# Patient Record
Sex: MALE | Race: BLACK OR AFRICAN AMERICAN | Employment: OTHER | URBAN - METROPOLITAN AREA
[De-identification: names, ages, dates, MRNs, and addresses within clinical notes are randomized per-mention and may not be internally consistent; named-entity substitution may affect disease eponyms.]

---

## 2020-12-30 ENCOUNTER — HOSPITAL ENCOUNTER (EMERGENCY)
Age: 80
Discharge: HOME | End: 2020-12-31
Attending: EMERGENCY MEDICINE
Payer: OTHER GOVERNMENT

## 2020-12-30 ENCOUNTER — APPOINTMENT (OUTPATIENT)
Dept: GENERAL RADIOLOGY | Age: 80
End: 2020-12-30
Payer: OTHER GOVERNMENT

## 2020-12-30 DIAGNOSIS — I10 SEVERE HYPERTENSION: Primary | ICD-10-CM

## 2020-12-30 DIAGNOSIS — I50.9 CONGESTIVE HEART FAILURE, UNSPECIFIED HF CHRONICITY, UNSPECIFIED HEART FAILURE TYPE (HCC): ICD-10-CM

## 2020-12-30 LAB
ALBUMIN SERPL-MCNC: 4 G/DL (ref 3.5–5.2)
ALP BLD-CCNC: 88 U/L (ref 40–129)
ALT SERPL-CCNC: 23 U/L (ref 0–40)
ANION GAP SERPL CALCULATED.3IONS-SCNC: 7 MMOL/L (ref 7–16)
AST SERPL-CCNC: 26 U/L (ref 0–39)
BASOPHILS ABSOLUTE: 0.03 E9/L (ref 0–0.2)
BASOPHILS RELATIVE PERCENT: 0.4 % (ref 0–2)
BILIRUB SERPL-MCNC: 0.4 MG/DL (ref 0–1.2)
BUN BLDV-MCNC: 26 MG/DL (ref 8–23)
CALCIUM SERPL-MCNC: 9.4 MG/DL (ref 8.6–10.2)
CHLORIDE BLD-SCNC: 100 MMOL/L (ref 98–107)
CO2: 32 MMOL/L (ref 22–29)
CREAT SERPL-MCNC: 1.4 MG/DL (ref 0.7–1.2)
EOSINOPHILS ABSOLUTE: 0.09 E9/L (ref 0.05–0.5)
EOSINOPHILS RELATIVE PERCENT: 1.3 % (ref 0–6)
GFR AFRICAN AMERICAN: 59
GFR NON-AFRICAN AMERICAN: 59 ML/MIN/1.73
GLUCOSE BLD-MCNC: 213 MG/DL (ref 74–99)
HCT VFR BLD CALC: 36.6 % (ref 37–54)
HEMOGLOBIN: 12.8 G/DL (ref 12.5–16.5)
IMMATURE GRANULOCYTES #: 0.01 E9/L
IMMATURE GRANULOCYTES %: 0.1 % (ref 0–5)
LIPASE: 21 U/L (ref 13–60)
LYMPHOCYTES ABSOLUTE: 1.73 E9/L (ref 1.5–4)
LYMPHOCYTES RELATIVE PERCENT: 25 % (ref 20–42)
MCH RBC QN AUTO: 32.9 PG (ref 26–35)
MCHC RBC AUTO-ENTMCNC: 35 % (ref 32–34.5)
MCV RBC AUTO: 94.1 FL (ref 80–99.9)
MONOCYTES ABSOLUTE: 0.74 E9/L (ref 0.1–0.95)
MONOCYTES RELATIVE PERCENT: 10.7 % (ref 2–12)
NEUTROPHILS ABSOLUTE: 4.31 E9/L (ref 1.8–7.3)
NEUTROPHILS RELATIVE PERCENT: 62.5 % (ref 43–80)
PDW BLD-RTO: 13.9 FL (ref 11.5–15)
PLATELET # BLD: 140 E9/L (ref 130–450)
PMV BLD AUTO: 10.1 FL (ref 7–12)
POTASSIUM SERPL-SCNC: 3.6 MMOL/L (ref 3.5–5)
PRO-BNP: 1234 PG/ML (ref 0–450)
RBC # BLD: 3.89 E12/L (ref 3.8–5.8)
SODIUM BLD-SCNC: 139 MMOL/L (ref 132–146)
TOTAL PROTEIN: 6.8 G/DL (ref 6.4–8.3)
TROPONIN: <0.01 NG/ML (ref 0–0.03)
WBC # BLD: 6.9 E9/L (ref 4.5–11.5)

## 2020-12-30 PROCEDURE — 85025 COMPLETE CBC W/AUTO DIFF WBC: CPT

## 2020-12-30 PROCEDURE — 71045 X-RAY EXAM CHEST 1 VIEW: CPT

## 2020-12-30 PROCEDURE — 83690 ASSAY OF LIPASE: CPT

## 2020-12-30 PROCEDURE — 83880 ASSAY OF NATRIURETIC PEPTIDE: CPT

## 2020-12-30 PROCEDURE — 96375 TX/PRO/DX INJ NEW DRUG ADDON: CPT

## 2020-12-30 PROCEDURE — 93005 ELECTROCARDIOGRAM TRACING: CPT | Performed by: EMERGENCY MEDICINE

## 2020-12-30 PROCEDURE — 6370000000 HC RX 637 (ALT 250 FOR IP): Performed by: EMERGENCY MEDICINE

## 2020-12-30 PROCEDURE — 84484 ASSAY OF TROPONIN QUANT: CPT

## 2020-12-30 PROCEDURE — 2500000003 HC RX 250 WO HCPCS: Performed by: EMERGENCY MEDICINE

## 2020-12-30 PROCEDURE — 81003 URINALYSIS AUTO W/O SCOPE: CPT

## 2020-12-30 PROCEDURE — 80053 COMPREHEN METABOLIC PANEL: CPT

## 2020-12-30 PROCEDURE — 96374 THER/PROPH/DIAG INJ IV PUSH: CPT

## 2020-12-30 PROCEDURE — 99285 EMERGENCY DEPT VISIT HI MDM: CPT

## 2020-12-30 RX ORDER — BUMETANIDE 0.25 MG/ML
1 INJECTION, SOLUTION INTRAMUSCULAR; INTRAVENOUS ONCE
Status: COMPLETED | OUTPATIENT
Start: 2020-12-30 | End: 2020-12-30

## 2020-12-30 RX ORDER — NITROGLYCERIN 0.4 MG/1
0.4 TABLET SUBLINGUAL EVERY 5 MIN PRN
Status: DISCONTINUED | OUTPATIENT
Start: 2020-12-30 | End: 2020-12-31 | Stop reason: HOSPADM

## 2020-12-30 RX ADMIN — BUMETANIDE 1 MG: 0.25 INJECTION, SOLUTION INTRAMUSCULAR; INTRAVENOUS at 23:01

## 2020-12-30 RX ADMIN — NITROGLYCERIN 1 INCH: 20 OINTMENT TOPICAL at 23:01

## 2020-12-30 ASSESSMENT — PAIN DESCRIPTION - PAIN TYPE: TYPE: ACUTE PAIN

## 2020-12-30 ASSESSMENT — PAIN DESCRIPTION - FREQUENCY: FREQUENCY: CONTINUOUS

## 2020-12-30 ASSESSMENT — PAIN DESCRIPTION - LOCATION: LOCATION: ABDOMEN;CHEST

## 2020-12-30 ASSESSMENT — PAIN SCALES - GENERAL: PAINLEVEL_OUTOF10: 0

## 2020-12-30 ASSESSMENT — PAIN DESCRIPTION - ONSET: ONSET: SUDDEN

## 2020-12-31 ENCOUNTER — APPOINTMENT (OUTPATIENT)
Dept: CT IMAGING | Age: 80
End: 2020-12-31
Payer: OTHER GOVERNMENT

## 2020-12-31 VITALS
HEART RATE: 72 BPM | OXYGEN SATURATION: 92 % | RESPIRATION RATE: 20 BRPM | WEIGHT: 205 LBS | TEMPERATURE: 97.9 F | SYSTOLIC BLOOD PRESSURE: 178 MMHG | BODY MASS INDEX: 28.7 KG/M2 | DIASTOLIC BLOOD PRESSURE: 80 MMHG | HEIGHT: 71 IN

## 2020-12-31 LAB
BILIRUBIN URINE: NEGATIVE
BLOOD, URINE: NEGATIVE
CLARITY: CLEAR
COLOR: YELLOW
EKG ATRIAL RATE: 63 BPM
EKG P AXIS: 54 DEGREES
EKG P-R INTERVAL: 234 MS
EKG Q-T INTERVAL: 508 MS
EKG QRS DURATION: 188 MS
EKG QTC CALCULATION (BAZETT): 519 MS
EKG R AXIS: -49 DEGREES
EKG T AXIS: 45 DEGREES
EKG VENTRICULAR RATE: 63 BPM
GLUCOSE URINE: NEGATIVE MG/DL
KETONES, URINE: NEGATIVE MG/DL
LEUKOCYTE ESTERASE, URINE: NEGATIVE
NITRITE, URINE: NEGATIVE
PH UA: 6 (ref 5–9)
PROTEIN UA: NEGATIVE MG/DL
SPECIFIC GRAVITY UA: 1.01 (ref 1–1.03)
UROBILINOGEN, URINE: 0.2 E.U./DL

## 2020-12-31 PROCEDURE — 6360000004 HC RX CONTRAST MEDICATION: Performed by: RADIOLOGY

## 2020-12-31 PROCEDURE — 6360000002 HC RX W HCPCS: Performed by: EMERGENCY MEDICINE

## 2020-12-31 PROCEDURE — 71275 CT ANGIOGRAPHY CHEST: CPT

## 2020-12-31 PROCEDURE — U0003 INFECTIOUS AGENT DETECTION BY NUCLEIC ACID (DNA OR RNA); SEVERE ACUTE RESPIRATORY SYNDROME CORONAVIRUS 2 (SARS-COV-2) (CORONAVIRUS DISEASE [COVID-19]), AMPLIFIED PROBE TECHNIQUE, MAKING USE OF HIGH THROUGHPUT TECHNOLOGIES AS DESCRIBED BY CMS-2020-01-R: HCPCS

## 2020-12-31 RX ORDER — HYDRALAZINE HYDROCHLORIDE 25 MG/1
25 TABLET, FILM COATED ORAL 3 TIMES DAILY
Qty: 45 TABLET | Refills: 0 | Status: ON HOLD | OUTPATIENT
Start: 2020-12-31 | End: 2021-03-04 | Stop reason: HOSPADM

## 2020-12-31 RX ORDER — HYDRALAZINE HYDROCHLORIDE 20 MG/ML
20 INJECTION INTRAMUSCULAR; INTRAVENOUS ONCE
Status: COMPLETED | OUTPATIENT
Start: 2020-12-31 | End: 2020-12-31

## 2020-12-31 RX ADMIN — IOPAMIDOL 80 ML: 755 INJECTION, SOLUTION INTRAVENOUS at 00:27

## 2020-12-31 RX ADMIN — HYDRALAZINE HYDROCHLORIDE 20 MG: 20 INJECTION INTRAMUSCULAR; INTRAVENOUS at 00:36

## 2020-12-31 NOTE — ED PROVIDER NOTES
HPI:  12/30/20,   Time: 10:29 PM ARPAN Pizarro is a 79 y.o. male presenting to the ED for shortness of breath especially orthopnea and dyspnea on exertion along with fluid retention particularly in his abdomen, beginning over the last week ago. The complaint has been constant, moderate in severity, and worsened by as noted above. No fever chills or night sweats no abdominal pain or vomiting or diarrhea    ROS:   Pertinent positives and negatives are stated within HPI, all other systems reviewed and are negative.  --------------------------------------------- PAST HISTORY ---------------------------------------------  Past Medical History:  has a past medical history of Blind right eye, CAD (coronary artery disease), CHF (congestive heart failure) ([facility]), Chronic respiratory failure ([facility] CKD (chronic kidney disease) stage 3, GFR 30-59 ml/min ([facility] Diabetes mellitus ([facility] Diabetes mellitus type 2, controlled ([facility] HLD (hyperlipidemia), Hypertension, and TIA (transient ischemic attack). Past Surgical History:  has a past surgical history that includes Coronary artery bypass graft (1994); Eye surgery; ECHO Compl W Dop Color Flow (5/23/2013); ECHO Compl W Dop Color Flow (5/23/2013); and Cardiac surgery. Social History:  reports that he has quit smoking. He quit after 2.00 years of use. He quit smokeless tobacco use about 36 years ago. He reports that he does not drink alcohol or use drugs. Family History: family history is not on file. The patients home medications have been reviewed.     Allergies: Aldactone [spironolactone]    -------------------------------------------------- RESULTS -------------------------------------------------  All laboratory and radiology results have been personally reviewed by myself   LABS:  Results for orders placed or performed during the hospital encounter of 12/30/20   CBC auto differential   Result Value Ref Range    WBC 6.9 4.5 - 11.5 E9/L    RBC 3.89 3.80 - 5.80 E12/L    Hemoglobin 12.8 12.5 - 16.5 g/dL    Hematocrit 36.6 (L) 37.0 - 54.0 %    MCV 94.1 80.0 - 99.9 fL    MCH 32.9 26.0 - 35.0 pg    MCHC 35.0 (H) 32.0 - 34.5 %    RDW 13.9 11.5 - 15.0 fL    Platelets 407 968 - 831 E9/L    MPV 10.1 7.0 - 12.0 fL    Neutrophils % 62.5 43.0 - 80.0 %    Immature Granulocytes % 0.1 0.0 - 5.0 %    Lymphocytes % 25.0 20.0 - 42.0 %    Monocytes % 10.7 2.0 - 12.0 %    Eosinophils % 1.3 0.0 - 6.0 %    Basophils % 0.4 0.0 - 2.0 %    Neutrophils Absolute 4.31 1.80 - 7.30 E9/L    Immature Granulocytes # 0.01 E9/L    Lymphocytes Absolute 1.73 1.50 - 4.00 E9/L    Monocytes Absolute 0.74 0.10 - 0.95 E9/L    Eosinophils Absolute 0.09 0.05 - 0.50 E9/L    Basophils Absolute 0.03 0.00 - 0.20 E9/L   Comprehensive metabolic panel   Result Value Ref Range    Sodium 139 132 - 146 mmol/L    Potassium 3.6 3.5 - 5.0 mmol/L    Chloride 100 98 - 107 mmol/L    CO2 32 (H) 22 - 29 mmol/L    Anion Gap 7 7 - 16 mmol/L    Glucose 213 (H) 74 - 99 mg/dL    BUN 26 (H) 8 - 23 mg/dL    CREATININE 1.4 (H) 0.7 - 1.2 mg/dL    GFR Non-African American 59 >=60 mL/min/1.73    GFR African American 59     Calcium 9.4 8.6 - 10.2 mg/dL    Total Protein 6.8 6.4 - 8.3 g/dL    Alb 4.0 3.5 - 5.2 g/dL    Total Bilirubin 0.4 0.0 - 1.2 mg/dL    Alkaline Phosphatase 88 40 - 129 U/L    ALT 23 0 - 40 U/L    AST 26 0 - 39 U/L   Lipase   Result Value Ref Range    Lipase 21 13 - 60 U/L   Troponin   Result Value Ref Range    Troponin <0.01 0.00 - 0.03 ng/mL   URINALYSIS   Result Value Ref Range    Color, UA Yellow Straw/Yellow    Clarity, UA Clear Clear    Glucose, Ur Negative Negative mg/dL    Bilirubin Urine Negative Negative    Ketones, Urine Negative Negative mg/dL    Specific Gravity, UA 1.015 1.005 - 1.030    Blood, Urine Negative Negative    pH, UA 6.0 5.0 - 9.0    Protein, UA Negative Negative mg/dL    Urobilinogen, Urine 0.2 <2.0 E.U./dL    Nitrite, Urine Negative Negative    Leukocyte Esterase, Urine Negative Negative Brain Natriuretic Peptide   Result Value Ref Range    Pro-BNP 1,234 (H) 0 - 450 pg/mL   Covid-19 Ambulatory   Result Value Ref Range    Source NP swab    EKG 12 Lead   Result Value Ref Range    Ventricular Rate 63 BPM    Atrial Rate 63 BPM    P-R Interval 234 ms    QRS Duration 188 ms    Q-T Interval 508 ms    QTc Calculation (Bazett) 519 ms    P Axis 54 degrees    R Axis -49 degrees    T Axis 45 degrees       RADIOLOGY:  Interpreted by Radiologist.  CTA PULMONARY W CONTRAST   Final Result   No acute pulmonary embolism. Bilateral tree-in-bud opacities consistent with infectious/inflammatory   pneumonitis. Cardiomegaly      XR CHEST PORTABLE   Final Result   No acute cardiopulmonary abnormality. Stable cardiomegaly. ------------------------- NURSING NOTES AND VITALS REVIEWED ---------------------------   The nursing notes within the ED encounter and vital signs as below have been reviewed. BP (!) 153/63   Pulse 68   Temp 97.9 °F (36.6 °C) (Temporal)   Resp 20   Ht 5' 10.5\" (1.791 m)   Wt 205 lb (93 kg)   SpO2 100%   BMI 29.00 kg/m²   Oxygen Saturation Interpretation: Abnormal      ---------------------------------------------------PHYSICAL EXAM--------------------------------------      Constitutional/General: Alert and oriented x3, well appearing, non toxic in NAD  Head: NC/AT  Eyes: PERRL, EOMI  Mouth: Oropharynx clear, handling secretions, no trismus  Neck: Supple, full ROM, no meningeal signs  Pulmonary: Lungs Rales in the bases not in respiratory distress  Cardiovascular:  Regular rate and rhythm, no murmurs, gallops, or rubs. 2+ distal pulses  Abdomen: Soft, non tender, non distended,   Extremities: Moves all extremities x 4.  Warm and well perfused  Skin: warm and dry without rash  Neurologic: GCS 15,  Psych: Normal Affect      ------------------------------ ED COURSE/MEDICAL DECISION MAKING----------------------  Medications   nitroGLYCERIN (NITROSTAT) SL tablet 0.4 mg ( Sublingual Held by provider 12/30/20 2244)   bumetanide (BUMEX) injection 1 mg (1 mg Intravenous Given 12/30/20 2301)   nitroglycerin (NITRO-BID) 2 % ointment 1 inch (1 inch Topical Given 12/30/20 2301)   hydrALAZINE (APRESOLINE) injection 20 mg (20 mg Intravenous Given 12/31/20 0036)   iopamidol (ISOVUE-370) 76 % injection 80 mL (80 mLs Intravenous Given 12/31/20 0027)         Medical Decision Making:    Suspected CHF exacerbation along with hypertension    Counseling: The emergency provider has spoken with the patient and discussed todays results, in addition to providing specific details for the plan of care and counseling regarding the diagnosis and prognosis. Questions are answered at this time and they are agreeable with the plan.      --------------------------------- IMPRESSION AND DISPOSITION ---------------------------------    IMPRESSION  1. Severe hypertension New Problem   2.  Congestive heart failure, unspecified HF chronicity, unspecified heart failure type ([facility] Stable       DISPOSITION  Disposition: Discharge to home  Patient condition is stable      ED course: Patient feeling much better after medications and blood pressure significantly improved after 20 mg of hydralazine and patient also received 1 inch of Nitropaste    Critical care time of 42 minutes            Eladio Jacques MD  12/31/20 1556

## 2020-12-31 NOTE — CARE COORDINATION
Social Work/Transition of Care:    SW received call from pts brother over concern that he dropped his brother off last evening and was to be called once his brother was dischrged in order to transport pt home, SW called [employer] and spoke with dispatch who verified they did transport a person from [facility] ED to pt address, SW updated pt brother who was still upset concerning the matter, SW provided information on speaking with the patient Advocate and provided number, ED Charge Nurse Ely updated on phone call.     Electronically signed by Cindy Bledsoe on 96/21/8232 at 12:06 PM

## 2021-01-01 LAB
SARS-COV-2: NOT DETECTED
SOURCE: NORMAL

## 2021-02-20 ENCOUNTER — APPOINTMENT (OUTPATIENT)
Dept: GENERAL RADIOLOGY | Age: 81
DRG: 335 | End: 2021-02-20
Payer: OTHER GOVERNMENT

## 2021-02-20 ENCOUNTER — HOSPITAL ENCOUNTER (INPATIENT)
Age: 81
LOS: 12 days | Discharge: NURSING, REHAB OR LONG-TERM CARE | DRG: 335 | End: 2021-03-04
Attending: EMERGENCY MEDICINE | Admitting: INTERNAL MEDICINE
Payer: OTHER GOVERNMENT

## 2021-02-20 ENCOUNTER — APPOINTMENT (OUTPATIENT)
Dept: CT IMAGING | Age: 81
DRG: 335 | End: 2021-02-20
Payer: OTHER GOVERNMENT

## 2021-02-20 DIAGNOSIS — Z99.81 SUPPLEMENTAL OXYGEN DEPENDENT: ICD-10-CM

## 2021-02-20 DIAGNOSIS — R79.89 ELEVATED BRAIN NATRIURETIC PEPTIDE (BNP) LEVEL: ICD-10-CM

## 2021-02-20 DIAGNOSIS — R10.84 GENERALIZED ABDOMINAL PAIN: ICD-10-CM

## 2021-02-20 DIAGNOSIS — K56.609 SBO (SMALL BOWEL OBSTRUCTION) (HCC): Primary | ICD-10-CM

## 2021-02-20 DIAGNOSIS — H54.3 BLIND IN BOTH EYES: ICD-10-CM

## 2021-02-20 DIAGNOSIS — N17.9 AKI (ACUTE KIDNEY INJURY) (HCC): ICD-10-CM

## 2021-02-20 LAB
ABO/RH: NORMAL
ACANTHOCYTES: ABNORMAL
ALBUMIN SERPL-MCNC: 4.2 G/DL (ref 3.5–5.2)
ALP BLD-CCNC: 56 U/L (ref 40–129)
ALT SERPL-CCNC: 19 U/L (ref 0–40)
ANION GAP SERPL CALCULATED.3IONS-SCNC: 14 MMOL/L (ref 7–16)
ANISOCYTOSIS: ABNORMAL
ANTIBODY SCREEN: NORMAL
APTT: 30 SEC (ref 24.5–35.1)
AST SERPL-CCNC: 24 U/L (ref 0–39)
BASOPHILS ABSOLUTE: 0.01 E9/L (ref 0–0.2)
BASOPHILS RELATIVE PERCENT: 0.1 % (ref 0–2)
BILIRUB SERPL-MCNC: 0.7 MG/DL (ref 0–1.2)
BUN BLDV-MCNC: 66 MG/DL (ref 8–23)
CALCIUM SERPL-MCNC: 9.4 MG/DL (ref 8.6–10.2)
CHLORIDE BLD-SCNC: 93 MMOL/L (ref 98–107)
CO2: 29 MMOL/L (ref 22–29)
CREAT SERPL-MCNC: 3.5 MG/DL (ref 0.7–1.2)
EOSINOPHILS ABSOLUTE: 0.01 E9/L (ref 0.05–0.5)
EOSINOPHILS RELATIVE PERCENT: 0.1 % (ref 0–6)
GFR AFRICAN AMERICAN: 20
GFR NON-AFRICAN AMERICAN: 20 ML/MIN/1.73
GLUCOSE BLD-MCNC: 167 MG/DL (ref 74–99)
HCT VFR BLD CALC: 41.9 % (ref 37–54)
HEMOGLOBIN: 14.7 G/DL (ref 12.5–16.5)
IMMATURE GRANULOCYTES #: 0.03 E9/L
IMMATURE GRANULOCYTES %: 0.3 % (ref 0–5)
INR BLD: 1.1
LACTIC ACID: 2 MMOL/L (ref 0.5–2.2)
LIPASE: 13 U/L (ref 13–60)
LYMPHOCYTES ABSOLUTE: 1.76 E9/L (ref 1.5–4)
LYMPHOCYTES RELATIVE PERCENT: 16.7 % (ref 20–42)
MCH RBC QN AUTO: 32.3 PG (ref 26–35)
MCHC RBC AUTO-ENTMCNC: 35.1 % (ref 32–34.5)
MCV RBC AUTO: 92.1 FL (ref 80–99.9)
MONOCYTES ABSOLUTE: 1.65 E9/L (ref 0.1–0.95)
MONOCYTES RELATIVE PERCENT: 15.7 % (ref 2–12)
NEUTROPHILS ABSOLUTE: 7.05 E9/L (ref 1.8–7.3)
NEUTROPHILS RELATIVE PERCENT: 67.1 % (ref 43–80)
OVALOCYTES: ABNORMAL
PDW BLD-RTO: 13.3 FL (ref 11.5–15)
PLATELET # BLD: 202 E9/L (ref 130–450)
PMV BLD AUTO: 10.2 FL (ref 7–12)
POIKILOCYTES: ABNORMAL
POLYCHROMASIA: ABNORMAL
POTASSIUM REFLEX MAGNESIUM: 4.2 MMOL/L (ref 3.5–5)
PRO-BNP: 1716 PG/ML (ref 0–450)
PROTHROMBIN TIME: 13 SEC (ref 9.3–12.4)
RBC # BLD: 4.55 E12/L (ref 3.8–5.8)
SARS-COV-2, NAAT: NOT DETECTED
SODIUM BLD-SCNC: 136 MMOL/L (ref 132–146)
TOTAL PROTEIN: 7.2 G/DL (ref 6.4–8.3)
TROPONIN: 0.05 NG/ML (ref 0–0.03)
WBC # BLD: 10.5 E9/L (ref 4.5–11.5)

## 2021-02-20 PROCEDURE — 99283 EMERGENCY DEPT VISIT LOW MDM: CPT

## 2021-02-20 PROCEDURE — 36415 COLL VENOUS BLD VENIPUNCTURE: CPT

## 2021-02-20 PROCEDURE — 83690 ASSAY OF LIPASE: CPT

## 2021-02-20 PROCEDURE — 93005 ELECTROCARDIOGRAM TRACING: CPT | Performed by: FAMILY MEDICINE

## 2021-02-20 PROCEDURE — 96374 THER/PROPH/DIAG INJ IV PUSH: CPT

## 2021-02-20 PROCEDURE — 85730 THROMBOPLASTIN TIME PARTIAL: CPT

## 2021-02-20 PROCEDURE — 2700000000 HC OXYGEN THERAPY PER DAY

## 2021-02-20 PROCEDURE — 2580000003 HC RX 258: Performed by: FAMILY MEDICINE

## 2021-02-20 PROCEDURE — 71045 X-RAY EXAM CHEST 1 VIEW: CPT

## 2021-02-20 PROCEDURE — 85025 COMPLETE CBC W/AUTO DIFF WBC: CPT

## 2021-02-20 PROCEDURE — 2500000003 HC RX 250 WO HCPCS: Performed by: EMERGENCY MEDICINE

## 2021-02-20 PROCEDURE — 80053 COMPREHEN METABOLIC PANEL: CPT

## 2021-02-20 PROCEDURE — 6360000002 HC RX W HCPCS: Performed by: EMERGENCY MEDICINE

## 2021-02-20 PROCEDURE — 86900 BLOOD TYPING SEROLOGIC ABO: CPT

## 2021-02-20 PROCEDURE — 85610 PROTHROMBIN TIME: CPT

## 2021-02-20 PROCEDURE — 96375 TX/PRO/DX INJ NEW DRUG ADDON: CPT

## 2021-02-20 PROCEDURE — 87635 SARS-COV-2 COVID-19 AMP PRB: CPT

## 2021-02-20 PROCEDURE — 2060000000 HC ICU INTERMEDIATE R&B

## 2021-02-20 PROCEDURE — 86901 BLOOD TYPING SEROLOGIC RH(D): CPT

## 2021-02-20 PROCEDURE — 74018 RADEX ABDOMEN 1 VIEW: CPT

## 2021-02-20 PROCEDURE — 86850 RBC ANTIBODY SCREEN: CPT

## 2021-02-20 PROCEDURE — 84484 ASSAY OF TROPONIN QUANT: CPT

## 2021-02-20 PROCEDURE — 83880 ASSAY OF NATRIURETIC PEPTIDE: CPT

## 2021-02-20 PROCEDURE — 74176 CT ABD & PELVIS W/O CONTRAST: CPT

## 2021-02-20 PROCEDURE — 83605 ASSAY OF LACTIC ACID: CPT

## 2021-02-20 PROCEDURE — 99223 1ST HOSP IP/OBS HIGH 75: CPT | Performed by: INTERNAL MEDICINE

## 2021-02-20 RX ORDER — SODIUM CHLORIDE 9 MG/ML
INJECTION, SOLUTION INTRAVENOUS CONTINUOUS
Status: DISCONTINUED | OUTPATIENT
Start: 2021-02-20 | End: 2021-02-21

## 2021-02-20 RX ORDER — SODIUM CHLORIDE 0.9 % (FLUSH) 0.9 %
10 SYRINGE (ML) INJECTION PRN
Status: DISCONTINUED | OUTPATIENT
Start: 2021-02-20 | End: 2021-03-04 | Stop reason: HOSPADM

## 2021-02-20 RX ORDER — HYDRALAZINE HYDROCHLORIDE 20 MG/ML
10 INJECTION INTRAMUSCULAR; INTRAVENOUS EVERY 6 HOURS PRN
Status: DISCONTINUED | OUTPATIENT
Start: 2021-02-20 | End: 2021-02-25

## 2021-02-20 RX ORDER — FENTANYL CITRATE 50 UG/ML
50 INJECTION, SOLUTION INTRAMUSCULAR; INTRAVENOUS ONCE
Status: COMPLETED | OUTPATIENT
Start: 2021-02-20 | End: 2021-02-20

## 2021-02-20 RX ORDER — DEXTROSE MONOHYDRATE 50 MG/ML
100 INJECTION, SOLUTION INTRAVENOUS PRN
Status: DISCONTINUED | OUTPATIENT
Start: 2021-02-20 | End: 2021-03-04 | Stop reason: HOSPADM

## 2021-02-20 RX ORDER — BRIMONIDINE TARTRATE 2 MG/ML
1 SOLUTION/ DROPS OPHTHALMIC 2 TIMES DAILY
Status: DISCONTINUED | OUTPATIENT
Start: 2021-02-21 | End: 2021-03-04 | Stop reason: HOSPADM

## 2021-02-20 RX ORDER — POLYETHYLENE GLYCOL 3350 17 G/17G
17 POWDER, FOR SOLUTION ORAL DAILY PRN
Status: DISCONTINUED | OUTPATIENT
Start: 2021-02-20 | End: 2021-03-04 | Stop reason: HOSPADM

## 2021-02-20 RX ORDER — NICOTINE POLACRILEX 4 MG
15 LOZENGE BUCCAL PRN
Status: DISCONTINUED | OUTPATIENT
Start: 2021-02-20 | End: 2021-03-04 | Stop reason: HOSPADM

## 2021-02-20 RX ORDER — ONDANSETRON 2 MG/ML
4 INJECTION INTRAMUSCULAR; INTRAVENOUS EVERY 6 HOURS PRN
Status: DISCONTINUED | OUTPATIENT
Start: 2021-02-20 | End: 2021-02-21 | Stop reason: SINTOL

## 2021-02-20 RX ORDER — IPRATROPIUM BROMIDE AND ALBUTEROL SULFATE 2.5; .5 MG/3ML; MG/3ML
3 SOLUTION RESPIRATORY (INHALATION) EVERY 6 HOURS PRN
Status: DISCONTINUED | OUTPATIENT
Start: 2021-02-20 | End: 2021-03-04 | Stop reason: HOSPADM

## 2021-02-20 RX ORDER — PROMETHAZINE HYDROCHLORIDE 25 MG/1
12.5 TABLET ORAL EVERY 6 HOURS PRN
Status: DISCONTINUED | OUTPATIENT
Start: 2021-02-20 | End: 2021-02-21 | Stop reason: SINTOL

## 2021-02-20 RX ORDER — ACETAMINOPHEN 650 MG/1
650 SUPPOSITORY RECTAL EVERY 6 HOURS PRN
Status: DISCONTINUED | OUTPATIENT
Start: 2021-02-20 | End: 2021-03-04 | Stop reason: HOSPADM

## 2021-02-20 RX ORDER — NITROGLYCERIN 0.4 MG/1
0.4 TABLET SUBLINGUAL EVERY 5 MIN PRN
Status: DISCONTINUED | OUTPATIENT
Start: 2021-02-20 | End: 2021-03-04 | Stop reason: HOSPADM

## 2021-02-20 RX ORDER — 0.9 % SODIUM CHLORIDE 0.9 %
1000 INTRAVENOUS SOLUTION INTRAVENOUS ONCE
Status: COMPLETED | OUTPATIENT
Start: 2021-02-20 | End: 2021-02-20

## 2021-02-20 RX ORDER — METOCLOPRAMIDE HYDROCHLORIDE 5 MG/ML
5 INJECTION INTRAMUSCULAR; INTRAVENOUS ONCE
Status: COMPLETED | OUTPATIENT
Start: 2021-02-20 | End: 2021-02-20

## 2021-02-20 RX ORDER — ONDANSETRON 2 MG/ML
4 INJECTION INTRAMUSCULAR; INTRAVENOUS ONCE
Status: DISCONTINUED | OUTPATIENT
Start: 2021-02-20 | End: 2021-02-20

## 2021-02-20 RX ORDER — ONDANSETRON 2 MG/ML
4 INJECTION INTRAMUSCULAR; INTRAVENOUS EVERY 6 HOURS PRN
Status: DISCONTINUED | OUTPATIENT
Start: 2021-02-20 | End: 2021-02-20

## 2021-02-20 RX ORDER — MINOXIDIL 10 MG/1
50 TABLET ORAL DAILY
Status: DISCONTINUED | OUTPATIENT
Start: 2021-02-21 | End: 2021-02-21

## 2021-02-20 RX ORDER — ACETAMINOPHEN 325 MG/1
650 TABLET ORAL EVERY 6 HOURS PRN
Status: DISCONTINUED | OUTPATIENT
Start: 2021-02-20 | End: 2021-03-04 | Stop reason: HOSPADM

## 2021-02-20 RX ORDER — SODIUM CHLORIDE 0.9 % (FLUSH) 0.9 %
10 SYRINGE (ML) INJECTION EVERY 12 HOURS SCHEDULED
Status: DISCONTINUED | OUTPATIENT
Start: 2021-02-21 | End: 2021-03-04 | Stop reason: HOSPADM

## 2021-02-20 RX ORDER — LATANOPROST 50 UG/ML
1 SOLUTION/ DROPS OPHTHALMIC NIGHTLY
Status: DISCONTINUED | OUTPATIENT
Start: 2021-02-21 | End: 2021-03-04 | Stop reason: HOSPADM

## 2021-02-20 RX ORDER — SODIUM CHLORIDE 450 MG/100ML
1000 INJECTION, SOLUTION INTRAVENOUS CONTINUOUS
Status: DISCONTINUED | OUTPATIENT
Start: 2021-02-20 | End: 2021-02-21

## 2021-02-20 RX ORDER — DEXTROSE MONOHYDRATE 25 G/50ML
12.5 INJECTION, SOLUTION INTRAVENOUS PRN
Status: DISCONTINUED | OUTPATIENT
Start: 2021-02-20 | End: 2021-03-04 | Stop reason: HOSPADM

## 2021-02-20 RX ORDER — TIMOLOL MALEATE 5 MG/ML
1 SOLUTION/ DROPS OPHTHALMIC 2 TIMES DAILY
Status: DISCONTINUED | OUTPATIENT
Start: 2021-02-21 | End: 2021-03-04 | Stop reason: HOSPADM

## 2021-02-20 RX ADMIN — METOCLOPRAMIDE 5 MG: 5 INJECTION, SOLUTION INTRAMUSCULAR; INTRAVENOUS at 18:14

## 2021-02-20 RX ADMIN — FAMOTIDINE 20 MG: 10 INJECTION INTRAVENOUS at 18:14

## 2021-02-20 RX ADMIN — FENTANYL CITRATE 50 MCG: 50 INJECTION, SOLUTION INTRAMUSCULAR; INTRAVENOUS at 22:19

## 2021-02-20 RX ADMIN — FENTANYL CITRATE 50 MCG: 50 INJECTION, SOLUTION INTRAMUSCULAR; INTRAVENOUS at 18:15

## 2021-02-20 RX ADMIN — SODIUM CHLORIDE 1000 ML: 9 INJECTION, SOLUTION INTRAVENOUS at 19:06

## 2021-02-20 ASSESSMENT — ENCOUNTER SYMPTOMS
VOMITING: 1
NAUSEA: 1
EYE PAIN: 0
EYE ITCHING: 0
SORE THROAT: 0
COUGH: 0
ABDOMINAL PAIN: 1
RHINORRHEA: 0
WHEEZING: 0
SHORTNESS OF BREATH: 1

## 2021-02-20 ASSESSMENT — PAIN DESCRIPTION - LOCATION
LOCATION: ABDOMEN;BACK
LOCATION: THROAT

## 2021-02-20 ASSESSMENT — PAIN SCALES - GENERAL
PAINLEVEL_OUTOF10: 8
PAINLEVEL_OUTOF10: 2
PAINLEVEL_OUTOF10: 8

## 2021-02-20 ASSESSMENT — PAIN DESCRIPTION - PAIN TYPE: TYPE: ACUTE PAIN

## 2021-02-20 ASSESSMENT — PAIN DESCRIPTION - FREQUENCY: FREQUENCY: CONTINUOUS

## 2021-02-20 NOTE — ED PROVIDER NOTES
headaches, no sore throat and no wheezing       Review of Systems   Constitutional: Negative for chills and fever. HENT: Negative for congestion, rhinorrhea and sore throat. Eyes: Negative for pain and itching. Respiratory: Positive for shortness of breath. Negative for cough and wheezing. Cardiovascular: Negative for chest pain and palpitations. Gastrointestinal: Positive for abdominal pain, nausea and vomiting. Endocrine: Negative for polyuria. Genitourinary: Negative for dysuria. Neurological: Negative for light-headedness and headaches. Objective:  Physical Exam  Vitals signs and nursing note reviewed. Constitutional:       General: He is not in acute distress. Appearance: He is obese. HENT:      Head: Normocephalic and atraumatic. Eyes:      Comments: Senile cataracts and blind   Neck:      Vascular: No JVD. Cardiovascular:      Rate and Rhythm: Normal rate and regular rhythm. Pulmonary:      Effort: Pulmonary effort is normal.      Breath sounds: Normal breath sounds. No wheezing or rales. Chest:      Chest wall: No deformity or tenderness. Abdominal:      General: Bowel sounds are normal.      Palpations: Abdomen is soft. Tenderness: There is abdominal tenderness (Global diffuse tenderness without rebound without guarding without rigidity). Comments: Vertical scar midline abdomen noted, no midline abdominal wall defect or herniation noted   Musculoskeletal:      Right lower leg: He exhibits no tenderness. Left lower leg: He exhibits no tenderness. Skin:     General: Skin is warm and dry. Neurological:      Mental Status: He is alert. Procedures     EKG: This EKG is signed and interpreted by me. Rate: 74  Rhythm: Sinus  Interpretation: Sinus rhythm first-degree block, left axis, right bundle block with left anterior fascicular block. MN is 226, QRS is 102, QTc is 541. Previously 519.   Nonspecific findings that are otherwise stable compared to prior  Comparison: changes compared to previous EKG      [facility]     ED Course as of Feb 20 2105   Sat Feb 20, 2021   1836 Mild cardiomegaly   XR CHEST PORTABLE [SB]   1907 Troponin(!): 0.05 [SB]   1907 Pro-BNP(!): 1,716 [SB]   1907 Creatinine(!): 3.5 [SB]      ED Course User Index  [SB] Linda Peña      ED Course as of Feb 20 2106   Sat Feb 20, 2021   1836 Mild cardiomegaly   XR CHEST PORTABLE [SB]   1907 Troponin(!): 0.05 [SB]   1907 Pro-BNP(!): 1,716 [SB]   1907 Creatinine(!): 3.5 [SB]      ED Course User Index  [SB] Annie ERIC Morales DO       --------------------------------------------- PAST HISTORY ---------------------------------------------  Past Medical History:  has a past medical history of Blind right eye, CAD (coronary artery disease), CHF (congestive heart failure) ([facility] Chronic respiratory failure ([facility] CKD (chronic kidney disease) stage 3, GFR 30-59 ml/min ([facility] [address] Diabetes mellitus ([facility] [address] Diabetes mellitus type 2, controlled ([facility] HLD (hyperlipidemia), Hypertension, and TIA (transient ischemic attack). Past Surgical History:  has a past surgical history that includes Coronary artery bypass graft (1994); Eye surgery; ECHO Compl W Dop Color Flow (5/23/2013); ECHO Compl W Dop Color Flow (5/23/2013); and Cardiac surgery. Social History:  reports that he has quit smoking. He quit after 2.00 years of use. He quit smokeless tobacco use about 36 years ago. He reports that he does not drink alcohol or use drugs. Family History: family history is not on file. The patients home medications have been reviewed.     Allergies: Aldactone [spironolactone]    -------------------------------------------------- RESULTS -------------------------------------------------    LABS:  Results for orders placed or performed during the hospital encounter of 02/20/21   CBC Auto Differential   Result Value Ref Range    WBC 10.5 4.5 - 11.5 E9/L    RBC 4.55 3.80 - 5.80 E12/L    Hemoglobin 14.7 12.5 - 16.5 g/dL    Hematocrit 41.9 37.0 - 54.0 %    MCV 92.1 80.0 - 99.9 fL    MCH 32.3 26.0 - 35.0 pg    MCHC 35.1 (H) 32.0 - 34.5 %    RDW 13.3 11.5 - 15.0 fL    Platelets 498 420 - 847 E9/L    MPV 10.2 7.0 - 12.0 fL    Neutrophils % 67.1 43.0 - 80.0 %    Immature Granulocytes % 0.3 0.0 - 5.0 %    Lymphocytes % 16.7 (L) 20.0 - 42.0 %    Monocytes % 15.7 (H) 2.0 - 12.0 %    Eosinophils % 0.1 0.0 - 6.0 %    Basophils % 0.1 0.0 - 2.0 %    Neutrophils Absolute 7.05 1.80 - 7.30 E9/L    Immature Granulocytes # 0.03 E9/L    Lymphocytes Absolute 1.76 1.50 - 4.00 E9/L    Monocytes Absolute 1.65 (H) 0.10 - 0.95 E9/L    Eosinophils Absolute 0.01 (L) 0.05 - 0.50 E9/L    Basophils Absolute 0.01 0.00 - 0.20 E9/L    Anisocytosis 1+     Polychromasia 1+     Poikilocytes 1+     Acanthocytes 1+     Ovalocytes 1+    Comprehensive Metabolic Panel w/ Reflex to MG   Result Value Ref Range    Sodium 136 132 - 146 mmol/L    Potassium reflex Magnesium 4.2 3.5 - 5.0 mmol/L    Chloride 93 (L) 98 - 107 mmol/L    CO2 29 22 - 29 mmol/L    Anion Gap 14 7 - 16 mmol/L    Glucose 167 (H) 74 - 99 mg/dL    BUN 66 (H) 8 - 23 mg/dL    CREATININE 3.5 (H) 0.7 - 1.2 mg/dL    GFR Non-African American 20 >=60 mL/min/1.73    GFR African American 20     Calcium 9.4 8.6 - 10.2 mg/dL    Total Protein 7.2 6.4 - 8.3 g/dL    Albumin 4.2 3.5 - 5.2 g/dL    Total Bilirubin 0.7 0.0 - 1.2 mg/dL    Alkaline Phosphatase 56 40 - 129 U/L    ALT 19 0 - 40 U/L    AST 24 0 - 39 U/L   Lipase   Result Value Ref Range    Lipase 13 13 - 60 U/L   Troponin   Result Value Ref Range    Troponin 0.05 (H) 0.00 - 0.03 ng/mL   Brain Natriuretic Peptide   Result Value Ref Range    Pro-BNP 1,716 (H) 0 - 450 pg/mL   Lactic Acid, Plasma   Result Value Ref Range    Lactic Acid 2.0 0.5 - 2.2 mmol/L   EKG 12 Lead   Result Value Ref Range    Ventricular Rate 74 BPM    Atrial Rate 74 BPM    P-R Interval 226 ms    QRS Duration 182 ms    Q-T Interval 488 ms    QTc Calculation (Bazett) 541 ms    P Axis 51 degrees    R Axis -42 degrees    T Axis 63 degrees       RADIOLOGY:  CT ABDOMEN PELVIS WO CONTRAST Additional Contrast? None   Final Result   Dilatation of multiple segments of small bowel throughout the abdomen and   pelvis with air-fluid levels consistent with small bowel obstruction. 2 areas of transition are seen in the right mid abdomen. A transition is seen at a site of rotatory configuration of small bowel and   mesenteric vessels with narrowing of small bowel suggestive of small bowel   volvulus. Follow-up with oral contrast may be helpful in further evaluation. Enlarged prostate. Prominent atherosclerotic peripheral vascular disease. XR CHEST PORTABLE   Final Result   Mild cardiomegaly status post median sternotomy. No acute infiltrates or   failure. XR ABDOMEN FOR NG/OG/NE TUBE PLACEMENT    (Results Pending)        ------------------------- NURSING NOTES AND VITALS REVIEWED ---------------------------  Date / Time Roomed:  2/20/2021  5:15 PM  ED Bed Assignment:  04/04    The nursing notes within the ED encounter and vital signs as below have been reviewed. Patient Vitals for the past 24 hrs:   BP Temp Temp src Pulse Resp SpO2 Height Weight   02/20/21 1721 128/61 97.6 °F (36.4 °C) Oral 70 22 99 % 5' 11\" (1.803 m) 190 lb (86.2 kg)       Oxygen Saturation Interpretation: Normal    ------------------------------------------ PROGRESS NOTES ------------------------------------------  Re-evaluation(s):  Time: 8548  Patients symptoms are improving  Repeat physical examination is improved    Time: 1930  Re-evaluation. Patients symptoms show no change  Repeat physical examination is not changed    Counseling:  I have spoken with the patient and discussed todays results, in addition to providing specific details for the plan of care and counseling regarding the diagnosis and prognosis.   Their questions are answered at this time and they are agreeable with the plan of dysrhythmia. CPT 19999    The patients available past medical records and past encounters were reviewed. MDM:     I, Dr. Chitra Hayes am the primary provider of record    Patient presents for several day history of abdominal discomfort. States he gets short of breath when the abdominal pain gets bad. Has had nausea but no vomiting. States he is blind is not sure if he has had a bowel movement but has had the urge to have bowel movements. Work-up undertaken, evidence of acute kidney injury, he was volume resuscitated. CT noted, on reexam he is minimal tenderness. Spoke with surgery NG ordered they will follow, spoke with medicine they will admit    My findings/plan: The primary encounter diagnosis was SBO (small bowel obstruction) ([address] Diagnoses of EMELY (acute kidney injury) ([address] Generalized abdominal pain, Blind in both eyes, Supplemental oxygen dependent, and Elevated brain natriuretic peptide (BNP) level were also pertinent to this visit.   New Prescriptions    No medications on file     DO Deondre Holm DO  02/20/21 2108       Deondre Brownlee DO  02/20/21 2108

## 2021-02-21 ENCOUNTER — APPOINTMENT (OUTPATIENT)
Dept: CT IMAGING | Age: 81
DRG: 335 | End: 2021-02-21
Payer: OTHER GOVERNMENT

## 2021-02-21 LAB
ACANTHOCYTES: ABNORMAL
ANION GAP SERPL CALCULATED.3IONS-SCNC: 12 MMOL/L (ref 7–16)
ATYPICAL LYMPHOCYTE RELATIVE PERCENT: 10.4 % (ref 0–4)
BASOPHILS ABSOLUTE: 0 E9/L (ref 0–0.2)
BASOPHILS RELATIVE PERCENT: 0 % (ref 0–2)
BILIRUBIN URINE: NEGATIVE
BLOOD, URINE: NEGATIVE
BUN BLDV-MCNC: 72 MG/DL (ref 8–23)
CALCIUM SERPL-MCNC: 8.9 MG/DL (ref 8.6–10.2)
CHLORIDE BLD-SCNC: 96 MMOL/L (ref 98–107)
CLARITY: CLEAR
CO2: 28 MMOL/L (ref 22–29)
COLOR: ABNORMAL
CREAT SERPL-MCNC: 3.5 MG/DL (ref 0.7–1.2)
EKG ATRIAL RATE: 74 BPM
EKG P AXIS: 51 DEGREES
EKG P-R INTERVAL: 226 MS
EKG Q-T INTERVAL: 488 MS
EKG QRS DURATION: 182 MS
EKG QTC CALCULATION (BAZETT): 541 MS
EKG R AXIS: -42 DEGREES
EKG T AXIS: 63 DEGREES
EKG VENTRICULAR RATE: 74 BPM
EOSINOPHILS ABSOLUTE: 0 E9/L (ref 0.05–0.5)
EOSINOPHILS RELATIVE PERCENT: 0 % (ref 0–6)
GFR AFRICAN AMERICAN: 20
GFR NON-AFRICAN AMERICAN: 20 ML/MIN/1.73
GLUCOSE BLD-MCNC: 140 MG/DL (ref 74–99)
GLUCOSE URINE: NEGATIVE MG/DL
HCT VFR BLD CALC: 42.2 % (ref 37–54)
HEMOGLOBIN: 14.1 G/DL (ref 12.5–16.5)
KETONES, URINE: NEGATIVE MG/DL
LEUKOCYTE ESTERASE, URINE: NEGATIVE
LYMPHOCYTES ABSOLUTE: 1.87 E9/L (ref 1.5–4)
LYMPHOCYTES RELATIVE PERCENT: 11.3 % (ref 20–42)
MCH RBC QN AUTO: 31.9 PG (ref 26–35)
MCHC RBC AUTO-ENTMCNC: 33.4 % (ref 32–34.5)
MCV RBC AUTO: 95.5 FL (ref 80–99.9)
METER GLUCOSE: 128 MG/DL (ref 74–99)
METER GLUCOSE: 133 MG/DL (ref 74–99)
METER GLUCOSE: 146 MG/DL (ref 74–99)
METER GLUCOSE: 147 MG/DL (ref 74–99)
METER GLUCOSE: 182 MG/DL (ref 74–99)
METER GLUCOSE: 183 MG/DL (ref 74–99)
METER GLUCOSE: 195 MG/DL (ref 74–99)
MONOCYTES ABSOLUTE: 1.44 E9/L (ref 0.1–0.95)
MONOCYTES RELATIVE PERCENT: 17.4 % (ref 2–12)
NEUTROPHILS ABSOLUTE: 5.19 E9/L (ref 1.8–7.3)
NEUTROPHILS RELATIVE PERCENT: 60.9 % (ref 43–80)
NITRITE, URINE: NEGATIVE
NUCLEATED RED BLOOD CELLS: 0 /100 WBC
OVALOCYTES: ABNORMAL
PDW BLD-RTO: 13.5 FL (ref 11.5–15)
PH UA: 5.5 (ref 5–9)
PLATELET # BLD: 166 E9/L (ref 130–450)
PMV BLD AUTO: 9.7 FL (ref 7–12)
POIKILOCYTES: ABNORMAL
POLYCHROMASIA: ABNORMAL
POTASSIUM REFLEX MAGNESIUM: 4.2 MMOL/L (ref 3.5–5)
PROTEIN UA: NEGATIVE MG/DL
RBC # BLD: 4.42 E12/L (ref 3.8–5.8)
SODIUM BLD-SCNC: 136 MMOL/L (ref 132–146)
SPECIFIC GRAVITY UA: 1.02 (ref 1–1.03)
TROPONIN: 0.04 NG/ML (ref 0–0.03)
UROBILINOGEN, URINE: 0.2 E.U./DL
WBC # BLD: 8.5 E9/L (ref 4.5–11.5)

## 2021-02-21 PROCEDURE — 82962 GLUCOSE BLOOD TEST: CPT

## 2021-02-21 PROCEDURE — 2580000003 HC RX 258: Performed by: INTERNAL MEDICINE

## 2021-02-21 PROCEDURE — 6360000002 HC RX W HCPCS: Performed by: INTERNAL MEDICINE

## 2021-02-21 PROCEDURE — 6370000000 HC RX 637 (ALT 250 FOR IP): Performed by: INTERNAL MEDICINE

## 2021-02-21 PROCEDURE — 36415 COLL VENOUS BLD VENIPUNCTURE: CPT

## 2021-02-21 PROCEDURE — 2060000000 HC ICU INTERMEDIATE R&B

## 2021-02-21 PROCEDURE — 6360000004 HC RX CONTRAST MEDICATION: Performed by: RADIOLOGY

## 2021-02-21 PROCEDURE — 74176 CT ABD & PELVIS W/O CONTRAST: CPT

## 2021-02-21 PROCEDURE — 93010 ELECTROCARDIOGRAM REPORT: CPT | Performed by: INTERNAL MEDICINE

## 2021-02-21 PROCEDURE — 84484 ASSAY OF TROPONIN QUANT: CPT

## 2021-02-21 PROCEDURE — 85025 COMPLETE CBC W/AUTO DIFF WBC: CPT

## 2021-02-21 PROCEDURE — 81003 URINALYSIS AUTO W/O SCOPE: CPT

## 2021-02-21 PROCEDURE — 99233 SBSQ HOSP IP/OBS HIGH 50: CPT | Performed by: INTERNAL MEDICINE

## 2021-02-21 PROCEDURE — 2700000000 HC OXYGEN THERAPY PER DAY

## 2021-02-21 PROCEDURE — 80048 BASIC METABOLIC PNL TOTAL CA: CPT

## 2021-02-21 RX ORDER — CARVEDILOL 25 MG/1
25 TABLET ORAL 2 TIMES DAILY WITH MEALS
Status: DISCONTINUED | OUTPATIENT
Start: 2021-02-21 | End: 2021-02-24

## 2021-02-21 RX ORDER — ATORVASTATIN CALCIUM 10 MG/1
10 TABLET, FILM COATED ORAL DAILY
Status: DISCONTINUED | OUTPATIENT
Start: 2021-02-21 | End: 2021-03-04 | Stop reason: HOSPADM

## 2021-02-21 RX ORDER — HYDRALAZINE HYDROCHLORIDE 25 MG/1
25 TABLET, FILM COATED ORAL 3 TIMES DAILY
Status: DISCONTINUED | OUTPATIENT
Start: 2021-02-21 | End: 2021-02-24

## 2021-02-21 RX ORDER — 0.9 % SODIUM CHLORIDE 0.9 %
500 INTRAVENOUS SOLUTION INTRAVENOUS ONCE
Status: COMPLETED | OUTPATIENT
Start: 2021-02-21 | End: 2021-02-21

## 2021-02-21 RX ORDER — ISOSORBIDE MONONITRATE 30 MG/1
30 TABLET, EXTENDED RELEASE ORAL DAILY
Status: DISCONTINUED | OUTPATIENT
Start: 2021-02-21 | End: 2021-02-24

## 2021-02-21 RX ORDER — DEXTROSE AND SODIUM CHLORIDE 5; .9 G/100ML; G/100ML
INJECTION, SOLUTION INTRAVENOUS CONTINUOUS
Status: DISCONTINUED | OUTPATIENT
Start: 2021-02-21 | End: 2021-02-23

## 2021-02-21 RX ADMIN — ATORVASTATIN CALCIUM 10 MG: 10 TABLET, FILM COATED ORAL at 11:21

## 2021-02-21 RX ADMIN — ISOSORBIDE MONONITRATE 30 MG: 30 TABLET, EXTENDED RELEASE ORAL at 11:21

## 2021-02-21 RX ADMIN — ACETAMINOPHEN 650 MG: 325 TABLET ORAL at 08:47

## 2021-02-21 RX ADMIN — DEXTROSE AND SODIUM CHLORIDE: 5; 900 INJECTION, SOLUTION INTRAVENOUS at 11:24

## 2021-02-21 RX ADMIN — INSULIN LISPRO 2 UNITS: 100 INJECTION, SOLUTION INTRAVENOUS; SUBCUTANEOUS at 22:35

## 2021-02-21 RX ADMIN — TIMOLOL MALEATE 1 DROP: 5 SOLUTION/ DROPS OPHTHALMIC at 20:15

## 2021-02-21 RX ADMIN — BRIMONIDINE TARTRATE 1 DROP: 2 SOLUTION OPHTHALMIC at 08:47

## 2021-02-21 RX ADMIN — LATANOPROST 1 DROP: 50 SOLUTION OPHTHALMIC at 20:14

## 2021-02-21 RX ADMIN — SODIUM CHLORIDE 500 ML: 9 INJECTION, SOLUTION INTRAVENOUS at 16:24

## 2021-02-21 RX ADMIN — PHENOL 1 SPRAY: 1.5 LIQUID ORAL at 13:27

## 2021-02-21 RX ADMIN — TIMOLOL MALEATE 1 DROP: 5 SOLUTION/ DROPS OPHTHALMIC at 08:47

## 2021-02-21 RX ADMIN — ACETAMINOPHEN 650 MG: 325 TABLET ORAL at 15:39

## 2021-02-21 RX ADMIN — SODIUM CHLORIDE 1000 ML: 4.5 INJECTION, SOLUTION INTRAVENOUS at 00:09

## 2021-02-21 RX ADMIN — BRIMONIDINE TARTRATE 1 DROP: 2 SOLUTION OPHTHALMIC at 16:11

## 2021-02-21 RX ADMIN — IOHEXOL 50 ML: 240 INJECTION, SOLUTION INTRATHECAL; INTRAVASCULAR; INTRAVENOUS; ORAL at 16:53

## 2021-02-21 RX ADMIN — ACETAMINOPHEN 650 MG: 325 TABLET ORAL at 00:09

## 2021-02-21 RX ADMIN — ENOXAPARIN SODIUM 30 MG: 100 INJECTION SUBCUTANEOUS at 08:47

## 2021-02-21 RX ADMIN — MINOXIDIL 50 MG: 10 TABLET ORAL at 08:48

## 2021-02-21 RX ADMIN — HYDRALAZINE HYDROCHLORIDE 10 MG: 20 INJECTION INTRAMUSCULAR; INTRAVENOUS at 08:48

## 2021-02-21 RX ADMIN — HYDRALAZINE HYDROCHLORIDE 25 MG: 25 TABLET, FILM COATED ORAL at 13:41

## 2021-02-21 RX ADMIN — TRIMETHOBENZAMIDE HYDROCHLORIDE 200 MG: 100 INJECTION INTRAMUSCULAR at 18:47

## 2021-02-21 RX ADMIN — INSULIN LISPRO 2 UNITS: 100 INJECTION, SOLUTION INTRAVENOUS; SUBCUTANEOUS at 17:50

## 2021-02-21 ASSESSMENT — PAIN DESCRIPTION - LOCATION: LOCATION: THROAT

## 2021-02-21 ASSESSMENT — PAIN SCALES - GENERAL
PAINLEVEL_OUTOF10: 2
PAINLEVEL_OUTOF10: 8
PAINLEVEL_OUTOF10: 3

## 2021-02-21 ASSESSMENT — PAIN DESCRIPTION - PAIN TYPE: TYPE: ACUTE PAIN

## 2021-02-21 NOTE — H&P
[facility] History and Physical      CHIEF COMPLAINT: Throwing up and abdominal pain for few days. History of Present Illness: 67-year-old -American male with past medical history of coronary artery disease, diabetes mellitus, hypertension, chronic kidney disease and COPD on 3 L oxygen at home came came for abdominal pain and throwing up for few days. History taken from the patient at the bedside, he mentioned 4 days before he started having abdominal pain and throwing up which is progressively worsening. He mentioned he had last bowel movement 4 days ago. He denies any fever, cough or shortness of breath. He did not give any history of bloody vomiting or coffee-ground vomiting. During my encounter patient is lying on the bed comfortably. Vitals in ER blood pressure 128/60, pulse 75, blood chemistry shows sodium 136, potassium 4.2, BUN 66 and creatinine 3.5. His proBNP 1, 716 and troponin 0 0.05. His blood count shows WBC 10.5 hemoglobin 14.7 and platelet 781. CT abdominal pelvis show dilatation of the small intestine with the distention. Informant(s) for H&P: Patient and EMR.     REVIEW OF SYSTEMS:  A comprehensive review of systems was negative except for: what is in the HPI      PMH:  Past Medical History:   Diagnosis Date    Blind right eye     CAD (coronary artery disease)     1994 CABG x 4    [facility]est[facility] ([facility]     Chronic respiratory failure ([address] 9/22/2014    CKD (chronic kidney disease) stage 3, GFR 30-59 ml/min ([facility])     Diabetes mellitus ([address]     Diabetes mellitus type 2, controlled ([address] 5/23/2013    HLD (hyperlipidemia)     Hypertension     TIA (transient ischemic attack)        Surgical History:  Past Surgical History:   Procedure Laterality Date    CARDIAC SURGERY      CORONARY ARTERY BYPASS GRAFT  1994    x4    ECHO COMPL W DOP COLOR FLOW  5/23/2013         ECHO COMPL W DOP COLOR FLOW  5/23/2013         EYE SURGERY edema  Neurologic: no cranial nerve deficit and speech normal        LABS:  Recent Labs     02/20/21  1819      K 4.2   CL 93*   CO2 29   BUN 66*   CREATININE 3.5*   GLUCOSE 167*   CALCIUM 9.4       Recent Labs     02/20/21  1819   WBC 10.5   RBC 4.55   HGB 14.7   HCT 41.9   MCV 92.1   MCH 32.3   MCHC 35.1*   RDW 13.3      MPV 10.2       No results for input(s): POCGLU in the last 72 hours. Radiology:   CT ABDOMEN PELVIS WO CONTRAST Additional Contrast? None   Final Result   Dilatation of multiple segments of small bowel throughout the abdomen and   pelvis with air-fluid levels consistent with small bowel obstruction. 2 areas of transition are seen in the right mid abdomen. A transition is seen at a site of rotatory configuration of small bowel and   mesenteric vessels with narrowing of small bowel suggestive of small bowel   volvulus. Follow-up with oral contrast may be helpful in further evaluation. Enlarged prostate. Prominent atherosclerotic peripheral vascular disease. XR CHEST PORTABLE   Final Result   Mild cardiomegaly status post median sternotomy. No acute infiltrates or   failure. XR ABDOMEN FOR NG/OG/NE TUBE PLACEMENT    (Results Pending)       EKG: Rhythm with left axis deviation, first-degree heart block and right bundle branch block. ASSESSMENT:      Active Problems:    Intestinal obstruction (HCC)  Resolved Problems:    * No resolved hospital problems. *      PLAN:    1. Small intestinal obstructions: History of hiatal hernia repair, possible adhesion, surgical consult done, recommended n.p.o. and NG tube for decompression, if does not improve within 24 hours possible laparotomy, IV fluid. 2.  Hypertension: Patient is to take Coreg and hydralazine orally, hold all oral medication, IV hydralazine if blood pressure go above 170 mmHg, monitor blood pressure. Continue clonidine patch.   3.  Coronary disease status post bypass surgery: Hold aspirin, Coreg and Imdur right now, nitroglycerin patch as needed for chest pain. 4.  Diabetes mellitus: Continue insulin coverage as patient in n.p.o.  5. COPD: Continue oxygen 3 liter per minute via nasal cannula  6. Acute on chronic kidney disease: Continue IV fluid and nephrology consult. Code Status: Full  DVT prophylaxis: Heparin subcutaneously. NOTE: This report was transcribed using voice recognition software. Every effort was made to ensure accuracy; however, inadvertent computerized transcription errors may be present.   Electronically signed by Arlene Raygoza MD on 2/20/2021 at 10:07 PM

## 2021-02-21 NOTE — PROGRESS NOTES
[facility] Progress Note    Admitting Date and Time: 2/20/2021  5:15 PM  Admit Dx: Intestinal obstruction ([facility] [address] [K56.609]    Subjective:  Patient is being followed for Intestinal obstruction ([facility] [address] [Y93.756]   Pt feels better, still with some abd pain. NGT to wall suction    ROS: denies fever, chills, cp, sob, HA unless stated above.       brimonidine  1 drop Left Eye BID    cloNIDine  2 patch Transdermal Weekly    latanoprost  1 drop Left Eye Nightly    minoxidil  50 mg Oral Daily    timolol  1 drop Left Eye BID    sodium chloride flush  10 mL Intravenous 2 times per day    enoxaparin  30 mg Subcutaneous Daily    insulin lispro  0-12 Units Subcutaneous Q4H    influenza virus vaccine  0.5 mL Intramuscular Prior to discharge         ipratropium-albuterol, 3 mL, Q6H PRN      nitroGLYCERIN, 0.4 mg, Q5 Min PRN      sodium chloride flush, 10 mL, PRN      promethazine, 12.5 mg, Q6H PRN    Or      ondansetron, 4 mg, Q6H PRN      polyethylene glycol, 17 g, Daily PRN      acetaminophen, 650 mg, Q6H PRN    Or      acetaminophen, 650 mg, Q6H PRN      glucose, 15 g, PRN      dextrose, 12.5 g, PRN      glucagon (rDNA), 1 mg, PRN      dextrose, 100 mL/hr, PRN      hydrALAZINE, 10 mg, Q6H PRN         Objective:    BP (!) 190/84   Pulse 80   Temp 98.1 °F (36.7 °C) (Oral)   Resp 18   Ht 5' 11\" (1.803 m)   Wt 190 lb (86.2 kg)   SpO2 97%   BMI 26.50 kg/m²     General Appearance: alert and oriented to person, place and time   Skin: warm and dry  Head: normocephalic and atraumatic  Eyes: pupils equal, round, and reactive to light, extraocular eye movements intact, conjunctivae normal  Neck: neck supple and non tender without mass   Pulmonary/Chest: clear to auscultation bilaterally- no wheezes, rales or rhonchi, normal air movement, no respiratory distress  Cardiovascular: normal rate, normal S1 and S2 and no carotid bruits  Abdomen: soft, non-tender, mildly distended, normal bowel sounds, no masses or organomegaly  Extremities: no cyanosis, no clubbing and no edema  Neurologic: no cranial nerve deficit and speech normal        Recent Labs     02/20/21  1819 02/21/21  0430    136   K 4.2 4.2   CL 93* 96*   CO2 29 28   BUN 66* 72*   CREATININE 3.5* 3.5*   GLUCOSE 167* 140*   CALCIUM 9.4 8.9       Recent Labs     02/20/21  1819 02/21/21  0430   WBC 10.5 8.5   RBC 4.55 4.42   HGB 14.7 14.1   HCT 41.9 42.2   MCV 92.1 95.5   MCH 32.3 31.9   MCHC 35.1* 33.4   RDW 13.3 13.5    166   MPV 10.2 9.7         Assessment:    Active Problems:    Intestinal obstruction (HCC)  Resolved Problems:    * No resolved hospital problems. *      Plan:  1. SBO, NGT, NPO, appreciate surgery input, IVF, possible ex-lap if no improvement. 2.  ARF presented with cr at 3.5, up from 1.4 2 months ago, most likley prerenal due to SBO, start IVF, nephrology was consulted. 3.  Hx of CAD s/p CABG, no chest pain, continue on coreg, stating and imdur, holdng asa for possible surgery. 4.  DM II, hold oral hypoglycemic agents and start on SSI, switch IVF to d5NS  5.  HTN, BP is not controlled, continue on clonidine patch, restart coreg and imdur, IV hydralazine prn. NOTE: This report was transcribed using voice recognition software. Every effort was made to ensure accuracy; however, inadvertent computerized transcription errors may be present.   Electronically signed by Jessica Nino MD on 2/21/2021 at 10:17 AM

## 2021-02-21 NOTE — PROGRESS NOTES
Department of Surgery - Adult  General Surgery  Dr. Kristel Garza's Progress Note      SUBJECTIVE: Overall, the patient states that he is doing well. OBJECTIVE      Physical    VITALS:  BP (!) 114/53   Pulse 80   Temp 98.1 °F (36.7 °C) (Oral)   Resp 18   Ht 5' 11\" (1.803 m)   Wt 190 lb (86.2 kg)   SpO2 97%   BMI 26.50 kg/m²   INTAKE/OUTPUT:      Intake/Output Summary (Last 24 hours) at 2021 1443  Last data filed at 2021 0610  Gross per 24 hour   Intake --   Output 700 ml   Net -700 ml     TEMPERATURE:  Current - Temp: 98.1 °F (36.7 °C); Max - Temp  Av.9 °F (36.6 °C)  Min: 97.6 °F (36.4 °C)  Max: 98.2 °F (36.8 °C)  RESPIRATIONS RANGE: Resp  Av.8  Min: 16  Max: 22  PULSE RANGE: Pulse  Av.4  Min: 70  Max: 80  BLOOD PRESSURE RANGE:  Systolic (57QUV), UBI:912 , Min:114 , YS   ; Diastolic (41NIN), JMI:23, Min:53, Max:88    PULSE OXIMETRY RANGE: SpO2  Av.3 %  Min: 96 %  Max: 99 %  CONSTITUTIONAL:  awake, alert, cooperative, no apparent distress, and appears stated age    ABDOMEN: The abdomen is soft nondistended with minimal right-sided abdominal tenderness without guarding or rebound. No masses are noted.   Data  CBC with Differential:    Lab Results   Component Value Date    WBC 8.5 2021    RBC 4.42 2021    HGB 14.1 2021    HCT 42.2 2021     2021    MCV 95.5 2021    MCH 31.9 2021    MCHC 33.4 2021    RDW 13.5 2021    NRBC 0.0 2021    SEGSPCT 56 2014    LYMPHOPCT 11.3 2021    MONOPCT 17.4 2021    BASOPCT 0.0 2021    MONOSABS 1.44 2021    LYMPHSABS 1.87 2021    EOSABS 0.00 2021    BASOSABS 0.00 2021     CMP:    Lab Results   Component Value Date     2021    K 4.2 2021    CL 96 2021    CO2 28 2021    BUN 72 2021    CREATININE 3.5 2021    GFRAA 20 2021    LABGLOM 20 2021    GLUCOSE 140 2021    PROT 7.2 02/20/2021    LABALBU 4.2 02/20/2021    CALCIUM 8.9 02/21/2021    BILITOT 0.7 02/20/2021    ALKPHOS 56 02/20/2021    AST 24 02/20/2021    ALT 19 02/20/2021     BMP:  Hepatic Function Panel:  Ionized Calcium:  No results found for: IONCA  Magnesium:    Lab Results   Component Value Date    MG 2.4 04/18/2015     Phosphorus:    Lab Results   Component Value Date    PHOS 4.4 04/18/2015       Current Inpatient Medications    Current Facility-Administered Medications: carvedilol (COREG) tablet 25 mg, 25 mg, Oral, BID WC  hydrALAZINE (APRESOLINE) tablet 25 mg, 25 mg, Oral, TID  isosorbide mononitrate (IMDUR) extended release tablet 30 mg, 30 mg, Oral, Daily  atorvastatin (LIPITOR) tablet 10 mg, 10 mg, Oral, Daily  dextrose 5 % and 0.9 % sodium chloride infusion, , Intravenous, Continuous  phenol 1.4 % mouth spray 1 spray, 1 spray, Mouth/Throat, Q2H PRN  brimonidine (ALPHAGAN) 0.2 % ophthalmic solution 1 drop, 1 drop, Left Eye, BID  ipratropium-albuterol (DUONEB) nebulizer solution 3 mL, 3 mL, Inhalation, Q6H PRN  latanoprost (XALATAN) 0.005 % ophthalmic solution 1 drop, 1 drop, Left Eye, Nightly  minoxidil (LONITEN) tablet 50 mg, 50 mg, Oral, Daily  nitroGLYCERIN (NITROSTAT) SL tablet 0.4 mg, 0.4 mg, Sublingual, Q5 Min PRN  timolol (TIMOPTIC) 0.5 % ophthalmic solution 1 drop, 1 drop, Left Eye, BID  sodium chloride flush 0.9 % injection 10 mL, 10 mL, Intravenous, 2 times per day  sodium chloride flush 0.9 % injection 10 mL, 10 mL, Intravenous, PRN  enoxaparin (LOVENOX) injection 30 mg, 30 mg, Subcutaneous, Daily  polyethylene glycol (GLYCOLAX) packet 17 g, 17 g, Oral, Daily PRN  acetaminophen (TYLENOL) tablet 650 mg, 650 mg, Oral, Q6H PRN **OR** acetaminophen (TYLENOL) suppository 650 mg, 650 mg, Rectal, Q6H PRN  glucose (GLUTOSE) 40 % oral gel 15 g, 15 g, Oral, PRN  dextrose 50 % IV solution, 12.5 g, Intravenous, PRN  glucagon (rDNA) injection 1 mg, 1 mg, Intramuscular, PRN  dextrose 5 % solution, 100 mL/hr, Intravenous, PRN  insulin lispro (HUMALOG) injection vial 0-12 Units, 0-12 Units, Subcutaneous, Q4H  hydrALAZINE (APRESOLINE) injection 10 mg, 10 mg, Intravenous, Q6H PRN  influenza A&B vaccine (FLUAD QUADRIVALENT) injection 0.5 mL, 0.5 mL, Intramuscular, Prior to discharge    ASSESSMENT:    79 y.o. black male who has had ongoing weight loss for quite some time. Now, the CT showed a possible volvulus although, his clinical picture does not fit the findings. The patient has had a previous small bowel resection. PLAN:    Antibiotics  Repeat CT of the abdomen and pelvis regarding the questionable volvulus. Repeat colonoscopy on Tuesday.     Markus Motley 2/21/20212:43 PM

## 2021-02-21 NOTE — CONSULTS
Nephrology Consult  [facility]    CC:   EMELY on CKD    HPI:   Hodan Delacruz is a 79year old male who was followed remotely in the office in 2015 by Dr. Arvind Levin for Chronic kidney disease. At that time his baseline creatinine was noted to be 1.8-1.9. he had not been followed for some time due to loss of insurance coverage per office notation. The only labs work found in the interim is a creatinine of 1.4 in 12/2020. His PMH includes: COPD, blindness, CDA with 4 vessel CABG in 1994, CHF, hypertension and hyperlipidemia. He presented to the ED for increased SOB and abdominal pain over the past week. States he has not eaten anything for about a week and has been very nauseous and has thrown up multiple times. He states he cannot keep fluids down. The pain is so bad that he is unable to relax and has not had an appetite.  Since the placement of the NG tube and medications here he is feeling better and was able to take medications po this am.     PMH:    Past Medical History:   Diagnosis Date    Blind right eye     CAD (coronary artery disease)     1994 CABG x 4    CHF (congestive heart failure) (HCC)     Chronic respiratory failure ([address] 9/22/2014    CKD (chronic kidney disease) stage 3, GFR 30-59 ml/min (HCC)     Diabetes mellitus ([address]     Diabetes mellitus type 2, controlled ([address] 5/23/2013    HLD (hyperlipidemia)     Hypertension     TIA (transient ischemic attack)        Patient Active Problem List   Diagnosis    COPD exacerbation ([address]    Chest pain    Hypertension    Coronary artery disease    Diabetes mellitus type 2, controlled ([address]    Hyperlipidemia    BPH (benign prostatic hyperplasia)    Hyperkalemia    Blind right eye    CKD (chronic kidney disease) stage 3, GFR 30-59 ml/min    CAD (coronary artery disease)    CHF (congestive heart failure) (HCC)    Chronic respiratory failure (HCC)    EMELY (acute kidney injury) ([address]    Intestinal obstruction (HCC)       Meds:     brimonidine  1 drop Left Eye BID    cloNIDine  2 patch Transdermal Weekly    latanoprost  1 drop Left Eye Nightly    minoxidil  50 mg Oral Daily    timolol  1 drop Left Eye BID    sodium chloride flush  10 mL Intravenous 2 times per day    enoxaparin  30 mg Subcutaneous Daily    insulin lispro  0-12 Units Subcutaneous Q4H    influenza virus vaccine  0.5 mL Intramuscular Prior to discharge        sodium chloride      sodium chloride      sodium chloride 1,000 mL (02/21/21 0009)    dextrose         Meds prn:     ipratropium-albuterol, nitroGLYCERIN, sodium chloride flush, promethazine **OR** ondansetron, polyethylene glycol, acetaminophen **OR** acetaminophen, glucose, dextrose, glucagon (rDNA), dextrose, hydrALAZINE    Meds prior to admission:     No current facility-administered medications on file prior to encounter. Current Outpatient Medications on File Prior to Encounter   Medication Sig Dispense Refill    hydrALAZINE (APRESOLINE) 25 MG tablet Take 1 tablet by mouth 3 times daily Use if blood pressure is greater than 180/100 45 tablet 0    NONFORMULARY Omega XL, contains  Green lipped mussel oil extract, EPA, DHA, ETA, and HORTENCIA      insulin NPH (NOVOLIN N) 100 UNIT/ML injection vial Inject 10 Units into the skin every evening. 1 vial 3    insulin NPH (NOVOLIN N) 100 UNIT/ML injection vial Inject 12 Units into the skin every morning. 1 vial 3    carvedilol (COREG) 25 MG tablet Take 25 mg by mouth 2 times daily (with meals).  sertraline (ZOLOFT) 25 MG tablet Take 25 mg by mouth daily.  cloNIDine (CATAPRES) 0.2 MG/24HR Place 2 patches onto the skin once a week. Changed on Mondays      aspirin 325 MG EC tablet Take 325 mg by mouth daily.  Omega 3-6-9 Fatty Acids (TRIPLE OMEGA-3-6-9 PO) Take 1 tablet by mouth 2 times daily.  latanoprost (XALATAN) 0.005 % ophthalmic solution Place 1 drop into the left eye nightly.       simethicone (MYLICON) 80 MG chewable tablet Take 80 mg by mouth every 6 hours as needed for Flatulence.  OXYGEN Inhale 4 L/min into the lungs.  brimonidine (ALPHAGAN) 0.2 % ophthalmic solution Place 1 drop into the left eye 2 times daily.  timolol (TIMOPTIC) 0.5 % ophthalmic solution Place 1 drop into the left eye 2 times daily.  ferrous sulfate 325 (65 FE) MG tablet Take 1 tablet by mouth 2 times daily (with meals). 30 tablet 1    ipratropium-albuterol (DUONEB) 0.5-2.5 (3) MG/3ML SOLN nebulizer solution Inhale 3 mLs into the lungs every 6 hours as needed for Shortness of Breath. 360 mL 1    isosorbide mononitrate (IMDUR) 30 MG CR tablet Take 1 tablet by mouth daily. 30 tablet 1    simvastatin (ZOCOR) 10 MG tablet Take 10 mg by mouth daily.  tamsulosin (FLOMAX) 0.4 MG capsule Take 0.4 mg by mouth every evening.  minoxidil (LONITEN) 10 MG tablet Take 50 mg by mouth daily.  travoprost, benzalkonium, (TRAVATAN) 0.004 % ophthalmic solution Place 1 drop into the left eye nightly.  nitroGLYCERIN (NITROSTAT) 0.4 MG SL tablet Place 0.4 mg under the tongue every 5 minutes as needed.  docusate sodium (COLACE) 100 MG capsule Take 100 mg by mouth daily as needed. Indications: Constipation         Allergies:    Aldactone [spironolactone]    Social History:     reports that he has quit smoking. He quit after 2.00 years of use. He quit smokeless tobacco use about 36 years ago. He reports that he does not drink alcohol or use drugs. Family History:     No family history on file.     ROS:     All pertinent + discussed in HPI  All other sx negative     Physical Exam:      Patient Vitals for the past 24 hrs:   BP Temp Temp src Pulse Resp SpO2 Height Weight   02/21/21 0800 (!) 190/84 98.1 °F (36.7 °C) Oral 80 18 -- -- --   02/20/21 2330 (!) 155/70 98.2 °F (36.8 °C) Oral 71 16 97 % -- --   02/20/21 2235 122/65 97.6 °F (36.4 °C) Oral 71 18 97 % -- --   02/20/21 2217 128/60 -- -- 75 20 96 % -- --   02/20/21 1721 128/61 97.6 °F (36.4 °C) Oral 70 22 99 % 5' 11\" (1.803 m) 190 lb (86.2 kg)         Intake/Output Summary (Last 24 hours) at 2/21/2021 2515  Last data filed at 2/21/2021 3257  Gross per 24 hour   Intake --   Output 700 ml   Net -700 ml       Constitutional: Patient in no acute distress   Head: normocephalic, atraumatic NG tube in place left nare  Neck: supple, no jvd  Cardiovascular:  S1 S2 no S3 or rub  Respiratory: Clear upper, diminisihed in bases   Gastrointestinal: soft, nontender, nondistended, no hepatosplenomegaly  Ext: no edema   Neuro: awake and alert.    Skin: dry, no rash       Data:    Recent Labs     02/20/21  1819 02/21/21  0430   WBC 10.5 8.5   HGB 14.7 14.1   HCT 41.9 42.2   MCV 92.1 95.5    166       Recent Labs     02/20/21  1819 02/21/21  0430    136   K 4.2 4.2   CL 93* 96*   CO2 29 28   CREATININE 3.5* 3.5*   BUN 66* 72*   LABGLOM 20 20   GLUCOSE 167* 140*   CALCIUM 9.4 8.9       No results found for: VITD25    No results found for: PTH    Recent Labs     02/20/21 1819   ALT 19   AST 24   ALKPHOS 56   BILITOT 0.7       Recent Labs     02/20/21 1819   LABALBU 4.2       Ferritin   Date Value Ref Range Status   02/01/2014 167 ng/mL Final     Comment:     Reference Ranges-  Adult males 20 - 60 years-    27 - 400 ng/mL  Adult females 16 - 60 years-  15 - 150 ng/mL  Adults greater than 60 years- no established reference range  Pediatrics-                   no established reference range     Iron   Date Value Ref Range Status   02/01/2014 93 59 - 158 mcg/dL Final     TIBC   Date Value Ref Range Status   02/01/2014 264 250 - 450 mcg/dL Final       No results found for: [id number]    No results found for: FOLATE      Lab Results   Component Value Date    COLORU Yellow 12/30/2020    NITRU Negative 12/30/2020    GLUCOSEU Negative 12/30/2020    KETUA Negative 12/30/2020    UROBILINOGEN 0.2 12/30/2020    BILIRUBINUR Negative 12/30/2020       No results found for: MYKE, CREURRAN, MACREATRATIO, OSMOU    No components found for:

## 2021-02-21 NOTE — CONSULTS
Consultation    Patient's Name/Date of Birth: Eladio Menezes / 1940    Date: February 20, 2021     PCP: Juanjo South DO     Chief Complaint:    Nausea, vomiting and abdominal pain    History of Present Illness: The patient is a 56-year-old black male who presents with 5 days of nausea, vomiting and abdominal pain. The patient states that he has not been able to eat for more than 5 days. He denies any other significant associated symptomatology including fevers and chills. The patient states that he has had a previous hernia repair and bowel resection at the [place]. The patient underwent a CT of the abdomen and pelvis with some concern of a possible volvulus.       Past Medical History:   Diagnosis Date    Blind right eye     CAD (coronary artery disease)     1994 CABG x 4    [facility]congest[facility] failure) ([facility])     Chronic respiratory failure ([address] 9/22/2014    CKD (chronic kidney disease) stage 3, GFR 30-59 ml/min ([facility])     Diabetes mellitus ([address]     Diabetes mellitus type 2, controlled ([address] 5/23/2013    HLD (hyperlipidemia)     Hypertension     TIA (transient ischemic attack)       Past Surgical History:   Procedure Laterality Date    CARDIAC SURGERY      CORONARY ARTERY BYPASS GRAFT  1994    x4    ECHO COMPL W DOP COLOR FLOW  5/23/2013         ECHO COMPL W DOP COLOR FLOW  5/23/2013         EYE SURGERY        Allergies: Aldactone [spironolactone]     Current Facility-Administered Medications   Medication Dose Route Frequency Provider Last Rate Last Admin    0.9 % sodium chloride infusion   Intravenous Continuous Angélica Jumbo, DO        fentaNYL (SUBLIMAZE) injection 50 mcg  50 mcg Nasal Once Angélica Jumbo, DO        0.9 % sodium chloride infusion   Intravenous Continuous Angélica Jumbo, DO         Current Outpatient Medications   Medication Sig Dispense Refill    hydrALAZINE (APRESOLINE) 25 MG tablet Take 1 tablet by mouth 3 times daily Use if blood pressure is greater than 180/100 45 tablet 0    NONFORMULARY Omega XL, contains  Green lipped mussel oil extract, EPA, DHA, ETA, and HORTENCIA      insulin NPH (NOVOLIN N) 100 UNIT/ML injection vial Inject 10 Units into the skin every evening. 1 vial 3    insulin NPH (NOVOLIN N) 100 UNIT/ML injection vial Inject 12 Units into the skin every morning. 1 vial 3    carvedilol (COREG) 25 MG tablet Take 25 mg by mouth 2 times daily (with meals).  sertraline (ZOLOFT) 25 MG tablet Take 25 mg by mouth daily.  cloNIDine (CATAPRES) 0.2 MG/24HR Place 2 patches onto the skin once a week. Changed on Mondays      aspirin 325 MG EC tablet Take 325 mg by mouth daily.  Omega 3-6-9 Fatty Acids (TRIPLE OMEGA-3-6-9 PO) Take 1 tablet by mouth 2 times daily.  latanoprost (XALATAN) 0.005 % ophthalmic solution Place 1 drop into the left eye nightly.  simethicone (MYLICON) 80 MG chewable tablet Take 80 mg by mouth every 6 hours as needed for Flatulence.  OXYGEN Inhale 4 L/min into the lungs.  brimonidine (ALPHAGAN) 0.2 % ophthalmic solution Place 1 drop into the left eye 2 times daily.  timolol (TIMOPTIC) 0.5 % ophthalmic solution Place 1 drop into the left eye 2 times daily.  ferrous sulfate 325 (65 FE) MG tablet Take 1 tablet by mouth 2 times daily (with meals). 30 tablet 1    ipratropium-albuterol (DUONEB) 0.5-2.5 (3) MG/3ML SOLN nebulizer solution Inhale 3 mLs into the lungs every 6 hours as needed for Shortness of Breath. 360 mL 1    isosorbide mononitrate (IMDUR) 30 MG CR tablet Take 1 tablet by mouth daily. 30 tablet 1    simvastatin (ZOCOR) 10 MG tablet Take 10 mg by mouth daily.  tamsulosin (FLOMAX) 0.4 MG capsule Take 0.4 mg by mouth every evening.  minoxidil (LONITEN) 10 MG tablet Take 50 mg by mouth daily.  travoprost, benzalkonium, (TRAVATAN) 0.004 % ophthalmic solution Place 1 drop into the left eye nightly.       nitroGLYCERIN (NITROSTAT) 0.4 MG SL tablet Place 0.4 mg under the

## 2021-02-22 ENCOUNTER — APPOINTMENT (OUTPATIENT)
Dept: GENERAL RADIOLOGY | Age: 81
DRG: 335 | End: 2021-02-22
Payer: OTHER GOVERNMENT

## 2021-02-22 ENCOUNTER — ANESTHESIA (OUTPATIENT)
Dept: OPERATING ROOM | Age: 81
DRG: 335 | End: 2021-02-22
Payer: OTHER GOVERNMENT

## 2021-02-22 ENCOUNTER — ANESTHESIA EVENT (OUTPATIENT)
Dept: OPERATING ROOM | Age: 81
DRG: 335 | End: 2021-02-22
Payer: OTHER GOVERNMENT

## 2021-02-22 LAB
ANION GAP SERPL CALCULATED.3IONS-SCNC: 13 MMOL/L (ref 7–16)
BUN BLDV-MCNC: 82 MG/DL (ref 8–23)
CALCIUM SERPL-MCNC: 8.2 MG/DL (ref 8.6–10.2)
CHLORIDE BLD-SCNC: 98 MMOL/L (ref 98–107)
CO2: 26 MMOL/L (ref 22–29)
CREAT SERPL-MCNC: 4.3 MG/DL (ref 0.7–1.2)
CREATININE URINE: 274 MG/DL (ref 40–278)
GFR AFRICAN AMERICAN: 16
GFR NON-AFRICAN AMERICAN: 16 ML/MIN/1.73
GLUCOSE BLD-MCNC: 198 MG/DL (ref 74–99)
HCT VFR BLD CALC: 40.4 % (ref 37–54)
HEMOGLOBIN: 13.8 G/DL (ref 12.5–16.5)
MAGNESIUM: 2.1 MG/DL (ref 1.6–2.6)
MCH RBC QN AUTO: 31.9 PG (ref 26–35)
MCHC RBC AUTO-ENTMCNC: 34.2 % (ref 32–34.5)
MCV RBC AUTO: 93.5 FL (ref 80–99.9)
METER GLUCOSE: 140 MG/DL (ref 74–99)
METER GLUCOSE: 145 MG/DL (ref 74–99)
METER GLUCOSE: 161 MG/DL (ref 74–99)
METER GLUCOSE: 187 MG/DL (ref 74–99)
METER GLUCOSE: 195 MG/DL (ref 74–99)
PDW BLD-RTO: 13.2 FL (ref 11.5–15)
PHOSPHORUS: 5.1 MG/DL (ref 2.5–4.5)
PLATELET # BLD: 164 E9/L (ref 130–450)
PMV BLD AUTO: 9.7 FL (ref 7–12)
POTASSIUM SERPL-SCNC: 3.7 MMOL/L (ref 3.5–5)
PROTEIN PROTEIN: 56 MG/DL (ref 0–12)
PROTEIN/CREAT RATIO: 0.2
PROTEIN/CREAT RATIO: 0.2 (ref 0–0.2)
RBC # BLD: 4.32 E12/L (ref 3.8–5.8)
SODIUM BLD-SCNC: 137 MMOL/L (ref 132–146)
SODIUM URINE: <20 MMOL/L
UREA NITROGEN, UR: 378 MG/DL (ref 800–1666)
WBC # BLD: 8.4 E9/L (ref 4.5–11.5)

## 2021-02-22 PROCEDURE — 84156 ASSAY OF PROTEIN URINE: CPT

## 2021-02-22 PROCEDURE — 6370000000 HC RX 637 (ALT 250 FOR IP): Performed by: INTERNAL MEDICINE

## 2021-02-22 PROCEDURE — 84100 ASSAY OF PHOSPHORUS: CPT

## 2021-02-22 PROCEDURE — 6360000002 HC RX W HCPCS: Performed by: INTERNAL MEDICINE

## 2021-02-22 PROCEDURE — 82962 GLUCOSE BLOOD TEST: CPT

## 2021-02-22 PROCEDURE — 86038 ANTINUCLEAR ANTIBODIES: CPT

## 2021-02-22 PROCEDURE — 85027 COMPLETE CBC AUTOMATED: CPT

## 2021-02-22 PROCEDURE — 36415 COLL VENOUS BLD VENIPUNCTURE: CPT

## 2021-02-22 PROCEDURE — 74018 RADEX ABDOMEN 1 VIEW: CPT

## 2021-02-22 PROCEDURE — 82570 ASSAY OF URINE CREATININE: CPT

## 2021-02-22 PROCEDURE — 2580000003 HC RX 258: Performed by: INTERNAL MEDICINE

## 2021-02-22 PROCEDURE — 97161 PT EVAL LOW COMPLEX 20 MIN: CPT

## 2021-02-22 PROCEDURE — 84540 ASSAY OF URINE/UREA-N: CPT

## 2021-02-22 PROCEDURE — 84300 ASSAY OF URINE SODIUM: CPT

## 2021-02-22 PROCEDURE — 2700000000 HC OXYGEN THERAPY PER DAY

## 2021-02-22 PROCEDURE — 97165 OT EVAL LOW COMPLEX 30 MIN: CPT

## 2021-02-22 PROCEDURE — 80048 BASIC METABOLIC PNL TOTAL CA: CPT

## 2021-02-22 PROCEDURE — 83735 ASSAY OF MAGNESIUM: CPT

## 2021-02-22 PROCEDURE — 2060000000 HC ICU INTERMEDIATE R&B

## 2021-02-22 PROCEDURE — 99233 SBSQ HOSP IP/OBS HIGH 50: CPT | Performed by: INTERNAL MEDICINE

## 2021-02-22 RX ORDER — MORPHINE SULFATE 2 MG/ML
1 INJECTION, SOLUTION INTRAMUSCULAR; INTRAVENOUS EVERY 4 HOURS PRN
Status: DISCONTINUED | OUTPATIENT
Start: 2021-02-22 | End: 2021-03-04 | Stop reason: HOSPADM

## 2021-02-22 RX ORDER — SODIUM CHLORIDE 9 MG/ML
10 INJECTION INTRAVENOUS DAILY
Status: CANCELLED | OUTPATIENT
Start: 2021-02-22

## 2021-02-22 RX ORDER — KETOROLAC TROMETHAMINE 30 MG/ML
15 INJECTION, SOLUTION INTRAMUSCULAR; INTRAVENOUS EVERY 6 HOURS PRN
Status: DISCONTINUED | OUTPATIENT
Start: 2021-02-22 | End: 2021-02-22

## 2021-02-22 RX ORDER — PANTOPRAZOLE SODIUM 40 MG/10ML
40 INJECTION, POWDER, LYOPHILIZED, FOR SOLUTION INTRAVENOUS DAILY
Status: CANCELLED | OUTPATIENT
Start: 2021-02-22

## 2021-02-22 RX ADMIN — INSULIN LISPRO 2 UNITS: 100 INJECTION, SOLUTION INTRAVENOUS; SUBCUTANEOUS at 17:45

## 2021-02-22 RX ADMIN — TRIMETHOBENZAMIDE HYDROCHLORIDE 200 MG: 100 INJECTION INTRAMUSCULAR at 21:14

## 2021-02-22 RX ADMIN — INSULIN LISPRO 2 UNITS: 100 INJECTION, SOLUTION INTRAVENOUS; SUBCUTANEOUS at 02:49

## 2021-02-22 RX ADMIN — TIMOLOL MALEATE 1 DROP: 5 SOLUTION/ DROPS OPHTHALMIC at 08:14

## 2021-02-22 RX ADMIN — BRIMONIDINE TARTRATE 1 DROP: 2 SOLUTION OPHTHALMIC at 15:42

## 2021-02-22 RX ADMIN — PHENOL 1 SPRAY: 1.5 LIQUID ORAL at 02:49

## 2021-02-22 RX ADMIN — DEXTROSE AND SODIUM CHLORIDE: 5; 900 INJECTION, SOLUTION INTRAVENOUS at 09:45

## 2021-02-22 RX ADMIN — PHENOL 1 SPRAY: 1.5 LIQUID ORAL at 17:59

## 2021-02-22 RX ADMIN — MORPHINE SULFATE 1 MG: 2 INJECTION, SOLUTION INTRAMUSCULAR; INTRAVENOUS at 22:47

## 2021-02-22 RX ADMIN — ENOXAPARIN SODIUM 30 MG: 100 INJECTION SUBCUTANEOUS at 08:14

## 2021-02-22 RX ADMIN — MORPHINE SULFATE 1 MG: 2 INJECTION, SOLUTION INTRAMUSCULAR; INTRAVENOUS at 18:44

## 2021-02-22 RX ADMIN — INSULIN LISPRO 2 UNITS: 100 INJECTION, SOLUTION INTRAVENOUS; SUBCUTANEOUS at 06:30

## 2021-02-22 RX ADMIN — BRIMONIDINE TARTRATE 1 DROP: 2 SOLUTION OPHTHALMIC at 08:13

## 2021-02-22 RX ADMIN — PHENOL 1 SPRAY: 1.5 LIQUID ORAL at 23:33

## 2021-02-22 RX ADMIN — INSULIN LISPRO 2 UNITS: 100 INJECTION, SOLUTION INTRAVENOUS; SUBCUTANEOUS at 21:33

## 2021-02-22 RX ADMIN — MORPHINE SULFATE 1 MG: 2 INJECTION, SOLUTION INTRAMUSCULAR; INTRAVENOUS at 09:53

## 2021-02-22 RX ADMIN — INSULIN LISPRO 2 UNITS: 100 INJECTION, SOLUTION INTRAVENOUS; SUBCUTANEOUS at 14:05

## 2021-02-22 RX ADMIN — PHENOL 1 SPRAY: 1.5 LIQUID ORAL at 15:43

## 2021-02-22 RX ADMIN — TIMOLOL MALEATE 1 DROP: 5 SOLUTION/ DROPS OPHTHALMIC at 21:18

## 2021-02-22 RX ADMIN — LATANOPROST 1 DROP: 50 SOLUTION OPHTHALMIC at 21:18

## 2021-02-22 RX ADMIN — INSULIN LISPRO 2 UNITS: 100 INJECTION, SOLUTION INTRAVENOUS; SUBCUTANEOUS at 09:45

## 2021-02-22 RX ADMIN — PHENOL 1 SPRAY: 1.5 LIQUID ORAL at 08:14

## 2021-02-22 RX ADMIN — DEXTROSE AND SODIUM CHLORIDE: 5; 900 INJECTION, SOLUTION INTRAVENOUS at 18:44

## 2021-02-22 ASSESSMENT — PAIN DESCRIPTION - ORIENTATION: ORIENTATION: RIGHT;LEFT;INNER

## 2021-02-22 ASSESSMENT — ENCOUNTER SYMPTOMS: SHORTNESS OF BREATH: 1

## 2021-02-22 ASSESSMENT — PAIN SCALES - GENERAL
PAINLEVEL_OUTOF10: 10
PAINLEVEL_OUTOF10: 8
PAINLEVEL_OUTOF10: 0
PAINLEVEL_OUTOF10: 6

## 2021-02-22 ASSESSMENT — PAIN DESCRIPTION - LOCATION
LOCATION: BACK;SHOULDER;THROAT
LOCATION: THROAT;BACK

## 2021-02-22 ASSESSMENT — PAIN DESCRIPTION - ONSET: ONSET: ON-GOING

## 2021-02-22 ASSESSMENT — PAIN DESCRIPTION - PAIN TYPE
TYPE: ACUTE PAIN
TYPE: ACUTE PAIN

## 2021-02-22 ASSESSMENT — PAIN DESCRIPTION - PROGRESSION
CLINICAL_PROGRESSION: NOT CHANGED
CLINICAL_PROGRESSION: GRADUALLY IMPROVING
CLINICAL_PROGRESSION: NOT CHANGED

## 2021-02-22 ASSESSMENT — PAIN DESCRIPTION - FREQUENCY: FREQUENCY: INTERMITTENT

## 2021-02-22 NOTE — PROGRESS NOTES
Pt has not been able to void. Bladder scan shows approximately 525mL of urine in bladder. Order received to place esquivel.

## 2021-02-22 NOTE — PLAN OF CARE
Problem: Inadequate oral food/beverage intake (NI-2.1)  Goal: Food and/or Nutrient Delivery  Continue NPO and ADAT once med appropriate. Will Start ONS w/ diet however highly recommend to consider PN soon if pt unable to tolerate PO intake in necxt ~1-2d. Please consult if PN to be pursued. Description: Individualized approach for food/nutrient provision.   Outcome: Met This Shift

## 2021-02-22 NOTE — PROGRESS NOTES
Physical Therapy    Facility/Department: [address] INTERMEDIATE  Initial Assessment    NAME: Den Romero  : 1940  MRN: [id number]    Date of Service: 2021      Patient Diagnosis(es): The primary encounter diagnosis was SBO (small bowel obstruction) ([facility] [address] Diagnoses of EMELY (acute kidney injury) ([facility] [address] Generalized abdominal pain, Blind in both eyes, Supplemental oxygen dependent, and Elevated brain natriuretic peptide (BNP) level were also pertinent to this visit. has a past medical history of Blind right eye, CAD (coronary artery disease), CHF (congestive heart failure) ([facility] Chronic respiratory failure ([facility] [address] CKD (chronic kidney disease) stage 3, GFR 30-59 ml/min ([facility]), Diabetes mellitus ([facility] [address] Diabetes mellitus type 2, controlled ([facility] [address] HLD (hyperlipidemia), Hypertension, and TIA (transient ischemic attack). has a past surgical history that includes Coronary artery bypass graft (); Eye surgery; ECHO Compl W Dop Color Flow (2013); ECHO Compl W Dop Color Flow (2013); and Cardiac surgery. Referring Provider:  Claudeen Roof, MD      Evaluating Therapist: Valencia CHANG      Room #:642  DIAGNOSIS: SBO  Additional Pertinent History:CHF, CAD, HTN, TIA, legally blind  PRECAUTIONS: falls, O2, NG, legally blind    Social:  Pt lives alone in a 2 floor plan. Has half bath on first floor  Prior to admission independent without device. Initial Evaluation  Date: 21 Treatment      Short Term/ Long Term   Goals   Was pt agreeable to Eval/treatment? yes     Does pt have pain?  Pain from catheter     Bed Mobility  Rolling: mod assist  Supine to sit: mod assist  Sit to supine: NT  Scooting: mod assist  Min assist   Transfers Sit to stand: min assist  Stand to sit: min assist  Stand pivot: min asssit  SBA   Ambulation    3 feet with ww with min assist  75 feet with ww with SBA   Stair Negotiation  Ascended and descended  NT   4-12 with 1 rail with min assist   LE strength     3+/5    4-/5

## 2021-02-22 NOTE — PROGRESS NOTES
[facility] Progress Note    Admitting Date and Time: 2/20/2021  5:15 PM  Admit Dx: Intestinal obstruction ([facility] [address] [K56.609]    Subjective:  Patient is being followed for Intestinal obstruction ([facility] [address] [C42.060]   Pt feels better, still with some abd pain. NGT to wall suction    ROS: denies fever, chills, cp, sob, HA unless stated above.       carvedilol  25 mg Oral BID WC    hydrALAZINE  25 mg Oral TID    isosorbide mononitrate  30 mg Oral Daily    atorvastatin  10 mg Oral Daily    brimonidine  1 drop Left Eye BID    latanoprost  1 drop Left Eye Nightly    timolol  1 drop Left Eye BID    sodium chloride flush  10 mL Intravenous 2 times per day    enoxaparin  30 mg Subcutaneous Daily    insulin lispro  0-12 Units Subcutaneous Q4H    influenza virus vaccine  0.5 mL Intramuscular Prior to discharge         phenol, 1 spray, Q2H PRN      trimethobenzamide, 200 mg, Q6H PRN      ipratropium-albuterol, 3 mL, Q6H PRN      nitroGLYCERIN, 0.4 mg, Q5 Min PRN      sodium chloride flush, 10 mL, PRN      polyethylene glycol, 17 g, Daily PRN      acetaminophen, 650 mg, Q6H PRN    Or      acetaminophen, 650 mg, Q6H PRN      glucose, 15 g, PRN      dextrose, 12.5 g, PRN      glucagon (rDNA), 1 mg, PRN      dextrose, 100 mL/hr, PRN      hydrALAZINE, 10 mg, Q6H PRN         Objective:    BP (!) 102/51   Pulse 86   Temp 98.1 °F (36.7 °C) (Oral)   Resp 18   Ht 5' 11\" (1.803 m)   Wt 190 lb (86.2 kg)   SpO2 95%   BMI 26.50 kg/m²     General Appearance: alert and oriented to person, place and time   Skin: warm and dry  Head: normocephalic and atraumatic  Eyes: pupils equal, round, and reactive to light, extraocular eye movements intact, conjunctivae normal  Neck: neck supple and non tender without mass   Pulmonary/Chest: clear to auscultation bilaterally- no wheezes, rales or rhonchi, normal air movement, no respiratory distress  Cardiovascular: normal rate, normal S1 and S2 and no carotid bruits  Abdomen: soft, non-tender, mildly distended, normal bowel sounds, no masses or organomegaly  Extremities: no cyanosis, no clubbing and no edema  Neurologic: no cranial nerve deficit and speech normal        Recent Labs     02/20/21 1819 02/21/21  0430 02/22/21  0525    136 137   K 4.2 4.2 3.7   CL 93* 96* 98   CO2 29 28 26   BUN 66* 72* 82*   CREATININE 3.5* 3.5* 4.3*   GLUCOSE 167* 140* 198*   CALCIUM 9.4 8.9 8.2*       Recent Labs     02/20/21 1819 02/21/21  0430 02/22/21  0525   WBC 10.5 8.5 8.4   RBC 4.55 4.42 4.32   HGB 14.7 14.1 13.8   HCT 41.9 42.2 40.4   MCV 92.1 95.5 93.5   MCH 32.3 31.9 31.9   MCHC 35.1* 33.4 34.2   RDW 13.3 13.5 13.2    166 164   MPV 10.2 9.7 9.7         Assessment:    Active Problems:    Intestinal obstruction (HCC)  Resolved Problems:    * No resolved hospital problems. *      Plan:  1. SBO, NGT, NPO, appreciate surgery input, IVF, possible ex-lap if no improvement. Repeated CT abd showed no changes with high grade SBO  colonoscopy tomorrow 2/23/21  2. ARF presented with cr at 3.5, up from 1.4 2 months ago, most likley prerenal due to SBO, start IVF with no improvement, cr is up to 4.3, nephrology was consulted. 3.  Hx of CAD s/p CABG, no chest pain, continue on coreg, stating and imdur, holdng asa for possible surgery. 4.  DM II, hold oral hypoglycemic agents and start on SSI, switch IVF to d5NS  5.  HTN, BP is not controlled, continue on clonidine patch, restart coreg and imdur, IV hydralazine prn.  6.  Back pain, start morpohine      NOTE: This report was transcribed using voice recognition software. Every effort was made to ensure accuracy; however, inadvertent computerized transcription errors may be present.   Electronically signed by Declan Ann MD on 2/22/2021 at 8:29 AM

## 2021-02-22 NOTE — PROGRESS NOTES
Department of Internal Medicine  Nephrology Attending Progress Note        SUBJECTIVE:  Mr Viola Murray up in chair, back from x-ray department which continues to show some dilated loops consistent with obstruction. Patient still not had a bowel movement but did have some flatus.   Renal parameters continue to worsen, PVR was 134 now with [place]  Physical Exam:    Vitals:    02/22/21 0800   BP: 118/60   Pulse: 80   Resp: 18   Temp: 97.9 °F (36.6 °C)   SpO2: 92%       I/O last 24 hours:  Intake/Output intake inaccurate/1850    Weight: Not weighed    General Appearance:  awake, alert, oriented, in mild distress, NG draining some blood-tinged fluid  Skin: No rash  Neck:  neck- supple, no mass, non-tender and no bruits  Lungs: Decreased breath sounds at bases  Heart: Regular rhythm     Abdominal: Decreased bowel sounds  Extremities: No edema  Peripheral Pulses:  +2    DATA:    CBC with Differential:    Lab Results   Component Value Date    WBC 8.4 02/22/2021    RBC 4.32 02/22/2021    HGB 13.8 02/22/2021    HCT 40.4 02/22/2021     02/22/2021    MCV 93.5 02/22/2021    MCH 31.9 02/22/2021    MCHC 34.2 02/22/2021    RDW 13.2 02/22/2021    NRBC 0.0 02/21/2021    SEGSPCT 56 02/03/2014    LYMPHOPCT 11.3 02/21/2021    MONOPCT 17.4 02/21/2021    BASOPCT 0.0 02/21/2021    MONOSABS 1.44 02/21/2021    LYMPHSABS 1.87 02/21/2021    EOSABS 0.00 02/21/2021    BASOSABS 0.00 02/21/2021     BMP:    Lab Results   Component Value Date     02/22/2021    K 3.7 02/22/2021    K 4.2 02/21/2021    CL 98 02/22/2021    CO2 26 02/22/2021    BUN 82 02/22/2021    LABALBU 4.2 02/20/2021    CREATININE 4.3 02/22/2021    CALCIUM 8.2 02/22/2021    GFRAA 16 02/22/2021    LABGLOM 16 02/22/2021    GLUCOSE 198 02/22/2021          carvedilol  25 mg Oral BID WC    hydrALAZINE  25 mg Oral TID    isosorbide mononitrate  30 mg Oral Daily    atorvastatin  10 mg Oral Daily    brimonidine  1 drop Left Eye BID    latanoprost  1 drop Left Eye Nightly    timolol  1 drop Left Eye BID    sodium chloride flush  10 mL Intravenous 2 times per day    enoxaparin  30 mg Subcutaneous Daily    insulin lispro  0-12 Units Subcutaneous Q4H    influenza virus vaccine  0.5 mL Intramuscular Prior to discharge      dextrose 5 % and 0.9 % NaCl 100 mL/hr at 02/22/21 0945    dextrose       morphine, phenol, trimethobenzamide, ipratropium-albuterol, nitroGLYCERIN, sodium chloride flush, polyethylene glycol, acetaminophen **OR** acetaminophen, glucose, dextrose, glucagon (rDNA), dextrose, hydrALAZINE    IMPRESSION/RECOMMENDATIONS:      Acute kidney injury superimposed on CKD 3-A, nonoliguric  Hypertension better control  Small bowel obstruction as per surgery  Type 2 diabetes      Aggie Ontiveros MD  2/22/2021 12:36 PM

## 2021-02-22 NOTE — PROGRESS NOTES
GENERAL SURGERY  DAILY PROGRESS NOTE  2/22/2021    Subjective:  States he has passed gas a couple of times. Denies bowel movement. Denies bloat. Is having some nausea and mild pain. Objective:  BP (!) 102/51   Pulse 86   Temp 98.1 °F (36.7 °C) (Oral)   Resp 18   Ht 5' 11\" (1.803 m)   Wt 190 lb (86.2 kg)   SpO2 95%   BMI 26.50 kg/m²     GENERAL:  Laying in bed, awake, alert, cooperative, no apparent distress  LUNGS:  No increased work of breathing  CARDIOVASCULAR:  RR  ABDOMEN:  Soft, non-tender, moderately distended, + tympanic.  cc output  EXTREMITIES: No edema or swelling    Assessment/Plan:  79 y.o. male with weight loss, concern for volvulus on CT.     - Repeat CT abd/pelv concerning for persistent obstruction, KUB pending this morning  - Continue NGT LIWS  - patient is not progressing and will likely require surgical intervention, will discuss timing with Dr. Ashok Momin. - No plan for colonoscopy at this time  - Continue care as per primary    Electronically signed by Jennifer Canseco MD on 2/22/2021 at 7:13 AM     The patient was seen and examined the chart was reviewed. Unfortunately, the patient has a small bowel obstruction likely related to adhesions or a stenosis at the anastomosis. Plan:    The patient will undergo a diagnostic laparoscopy with possible laparotomy and small bowel resection tomorrow.

## 2021-02-22 NOTE — CARE COORDINATION
Met w/ patient. Explained role of  and plan of care. Pt only has [employer] benefits. Lives alone in a 2 story house. Hx HHC-agency unknown. No Hx FANG. Currently on O2 2lNC-Wears home O2 2lNC provided through [place]. PCP is Dr. Jeff Noel and utilizes [address] for medications. States legally blind and does not drive- brother provides needed transportation. Currently NPO- NG to LIS. PT am-pac 14. Per pt, plan is t return home on discharge. Will follow pending progress.   Benigno Nino

## 2021-02-22 NOTE — PROGRESS NOTES
Occupational Therapy  OCCUPATIONAL THERAPY INITIAL EVALUATION      Date:2021  Patient Name: Rosa Sparks  MRN: [id number]  : 1940  Room: [address] [place] [address]    Referring Provider: Laura Gar MD    Evaluating OT: Venkat Gutierrez OTR/L [id number]    AM-PAC Daily Activity Raw Score: 15/24    Recommended Adaptive Equipment: TBD    Diagnosis: intestinal obstruction. Pertinent Medical History: CAD, CHF, DM, HTN, CKD   Precautions:  Falls, O2, NG to suction     Home Living: PLOF from pt report. Pt lives alone in a 2 story home with B/B 2nd floor, 1/2 bath 1st floor. Bathroom setup: tub/shower combo, standard commode     Prior Level of Function: Mod I with ADLs, brother assists PRN with IADLs; completed functional mobility with no AD. 2L home O2. Driving: No. Brother provides transportation. Pain Level: abdominal pain, nursing aware    Cognition: A&O: 3-4/4. Pt is alert and oriented but easily confused   Problem solving:  fair   Judgement/safety:  fair     Functional Assessment:   Initial Eval Status  Date: 21 Treatment session:  Short Term Goals     Feeding Min A  Set up   Grooming Min A  Set up   UB Dressing Min A  Management of hospital gown  Set up   LB Dressing Max A  Primarily limited by abdominal pain and NG to suction  SBA    Bathing Mod A  SBA   Toileting Mod A  SBA   Bed Mobility  Supine to sit: Mod A     Functional Transfers STS: Min A  Supervision   Functional Mobility Min A with [employer]  Small steps to armchair  Limited further d/t reported weakness/fatigue  Pt requests to sit  Supervision during ADLs   Balance Sitting: fair    Standing: fair minus at [employer]     Activity Tolerance Poor   3L O2 throughout session  standing sukh x5-6 min with fair plus balance during self care tasks             Treatment: Patient educated on techniques for completion of ADL, safe functional transfers and functional mobility.   Patient required cues for follow through with proper hand/foot placement, pacing, safety, compensatory strategies, breathing, attention, sequencing and technique in bed mobility, functional transfers, functional mobility and LB dressing in preparation for maximum independence in all self care tasks.      Hand Dominance: Right []  Left []   Strength ROM Additional Info:    RUE  4-/5 WFL grossly good  and FMC/dexterity noted during ADL tasks     LUE 4-/5 WFL grossly good  and FMC/dexterity noted during ADL tasks         Hearing: WFL   Vision: legally blind, reports primarily black   Sensation:  No c/o numbness or tingling   Tone: WFL   Edema: none                             Long Term Goal (1-3 wks): Pt will maximize functional performance in all self care tasks/functional transfers with good follow through of all trained techniques for safe transition to next level of care    Assessment of current deficits   Functional mobility [x]  ADLs [x] Strength [x]  Cognition []  Functional transfers  [x] IADLs [x] Safety Awareness [x]  Endurance [x]  Fine Motor Coordination [] Balance [x] Vision/perception [] Sensation []   Gross Motor Coordination [] ROM [] Delirium []                  Motor Control []    Plan of Care: 2-5 days/week for 1-2 weeks PRN   [x]ADL retraining/adaptive techniques and AE recommendations to increase functional independence within precautions                    [x]Energy conservation techniques to improve tolerance for ADL/IADLs  [x]Functional transfer/mobility training/DME recommendations for increased independence, safety and fall prevention         [x]Patient/family education to increase safety and functional independence during daily routine          [x]Environmental modifications for safe mobility and completion of ADLs                             []Cognitive retraining to improve problem solving skills & safe participation in ADLs/IADLs     []Sensory re-education techniques to improve extremity awareness, maintain skin integrity and improve hand function []Visual/Perceptual retraining to improve body awareness and safety during transfers and ADLs  []Splinting/positioning needs to maintain joint/skin integrity and contracture prevention  [x]Therapeutic activity to improve functional performance during ADLs                                        [x]Therapeutic exercise to improve tolerance and functional strength for ADLs   [x]Balance retraining/tolerance tasks for facilitation of postural control with dynamic challenges during ADLs  []Neuromuscular re-education to facilitate righting/equilibrium reactions, midline orientation, scapular stability/mobility, normalize muscle tone and facilitate active functional movement                        []Delirium prevention/treatment    [x]Positioning to improve functional independence and decrease risk of skin breakdown  []Other:     Rehab Potential: Good for established goals     Patient/Family Goal: To get home. Patient and/or family were instructed on functional diagnosis, prognosis/goals and OT plan of care. Pt verbalized understanding. Upon arrival, patient supine in bed. At end of session, patient seated in armchair with call light and phone within reach, all lines and tubes intact. Pt would benefit from continued skilled OT to increase safety and independence with completion of ADL/IADL tasks for functional independence and quality of life. Bed/chair alarm: ON. Nursing notified of status.     Low Evaluation [id number]  Time In: 1050   Time Out: 1107      Evaluation time includes thorough review of current medical information, gathering information on past medical history/social history and prior level of function, completion of standardized testing/informal observation of tasks, assessment of data, and development of POC/Goals    Glorious Channel OTR/L  [id number]

## 2021-02-22 NOTE — PROGRESS NOTES
Comprehensive Nutrition Assessment    Type and Reason for Visit:  Initial, Positive Nutrition Screen    Nutrition Recommendations/Plan: Continue NPO and ADAT once med appropriate. Will Start ONS w/ diet however highly recommend to consider PN soon if pt unable to tolerate PO intake in necxt ~1-2d. Please consult if PN to be pursued. If Needed;  PN Recommendations:  Adult PN 3-in-1 Central Standard @ 80 ml/hr (Goal) Continuous x 24hrs/d to provide 1920 ml TV, 1987 kcals, 96 gm AA. Pt at high risk for Re-Feeding Syndrome d/t minimal intake x ~6-7d now total (including stated poor intake pta) - Highly recommend to start at low rate and increase slowly as tolerated to goal w/ close monitoring of BGL/Lytes labs and adjust/treat as needed. Nutrition Assessment:  Pt adm w/ SOB, abd pain and n/v/c x ~5d pta 2/2 SBO w/ EMELY/CKD/CHF and COPD hx pta. Pt at risk d/t poor intake x ~7d total now (including 5d pta) d/t poor appetite w/ n/v/c and abd pain 2/2 SBO. Will Start ONS once placed on diet however may need to consider PN soon. Malnutrition Assessment:  Malnutrition Status:  Insufficient data    Context:  Acute Illness     Findings of the 6 clinical characteristics of malnutrition:  Energy Intake:  7 - 50% or less of estimated energy requirements for 5 or more days  Weight Loss:  Unable to assess(2/2 poor EMR wt hx w/ no actual wt's pta and CHF/CKD hx w/ suspected fluid shifts)     Body Fat Loss:  Unable to assess     Muscle Mass Loss:  Unable to assess    Fluid Accumulation:  No significant fluid accumulation     Strength:  Not Performed    Estimated Daily Nutrient Needs:  Energy (kcal):  0727-3883(MSJ x 1.2 SF per CBW); Weight Used for Energy Requirements:  Current     Protein (g):  (1.2-1.4 gm/kg per IBW as tolerated w/ CKD);  Weight Used for Protein Requirements:  Ideal        Fluid (ml/day):  1826-4595; Method Used for Fluid Requirements:  1 ml/kcal      Nutrition Related Findings:  A&O, dentition WNL, tender/distended Abd, Absent BS, +N/V/C, +NGT to LIWS, no edema, -I/O's(possible plan for Ex Lap soon)      Wounds:  None       Current Nutrition Therapies:    Diet NPO Effective Now    Anthropometric Measures:  · Height: 5' 11\" (180.3 cm)  · Current Body Weight: 190 lb (86.2 kg)(unknown method 2/20)   · Admission Body Weight: 190 lb (86.2 kg)(unknown method 2/20)    · Usual Body Weight: 205 lb (93 kg)(unknown method 12/30/20 per EMR hx; DARION wt loss properly at this time d/t poor EMR wt hx w/ no actual wt's)     · Ideal Body Weight: 172 lbs; % Ideal Body Weight     · BMI: 26.5  · Adjusted Body Weight:  ; No Adjustment   · Adjusted BMI:      · BMI Categories: Overweight (BMI 25.0-29. 9)       Nutrition Diagnosis:   · Inadequate oral intake related to altered GI function(2/2 SBO) as evidenced by NPO or clear liquid status due to medical condition, poor intake prior to admission, weight loss, GI abnormality, nausea, vomiting, constipation      Nutrition Interventions:   Food and/or Nutrient Delivery:  Continue NPO(Continue NPO and ADAT once med appropriate. Will Start ONS w/ diet however highly recommend to consider PN soon if pt unable to tolerate PO intake in necxt ~1-2d. Please consult if PN to be pursued.)  Nutrition Education/Counseling:  No recommendation at this time   Coordination of Nutrition Care:  Continue to monitor while inpatient    Goals:  Nutrition Progression. Nutrition Monitoring and Evaluation:   Behavioral-Environmental Outcomes:  None Identified   Food/Nutrient Intake Outcomes:  Diet Advancement/Tolerance  Physical Signs/Symptoms Outcomes:  Biochemical Data, Constipation, GI Status, Nausea or Vomiting, Fluid Status or Edema, Nutrition Focused Physical Findings, Skin, Weight     Discharge Planning:     Too soon to determine     Electronically signed by Leonor Natarajan RD, MAIDA on 2/22/21 at 1:11 PM EST    Contact: ext [phone]

## 2021-02-23 LAB
ANION GAP SERPL CALCULATED.3IONS-SCNC: 12 MMOL/L (ref 7–16)
ANTI-NUCLEAR ANTIBODY (ANA): NEGATIVE
BUN BLDV-MCNC: 83 MG/DL (ref 8–23)
CALCIUM IONIZED: 1.01 MMOL/L (ref 1.15–1.33)
CALCIUM SERPL-MCNC: 7.9 MG/DL (ref 8.6–10.2)
CHLORIDE BLD-SCNC: 104 MMOL/L (ref 98–107)
CO2: 24 MMOL/L (ref 22–29)
CREAT SERPL-MCNC: 3.3 MG/DL (ref 0.7–1.2)
GFR AFRICAN AMERICAN: 22
GFR NON-AFRICAN AMERICAN: 22 ML/MIN/1.73
GLUCOSE BLD-MCNC: 183 MG/DL (ref 74–99)
MAGNESIUM: 2.1 MG/DL (ref 1.6–2.6)
METER GLUCOSE: 156 MG/DL (ref 74–99)
METER GLUCOSE: 160 MG/DL (ref 74–99)
METER GLUCOSE: 181 MG/DL (ref 74–99)
METER GLUCOSE: 187 MG/DL (ref 74–99)
METER GLUCOSE: 202 MG/DL (ref 74–99)
METER GLUCOSE: 220 MG/DL (ref 74–99)
METER GLUCOSE: >500 MG/DL (ref 74–99)
POTASSIUM SERPL-SCNC: 3.3 MMOL/L (ref 3.5–5)
SODIUM BLD-SCNC: 140 MMOL/L (ref 132–146)

## 2021-02-23 PROCEDURE — 2700000000 HC OXYGEN THERAPY PER DAY

## 2021-02-23 PROCEDURE — 2500000003 HC RX 250 WO HCPCS: Performed by: INTERNAL MEDICINE

## 2021-02-23 PROCEDURE — 82330 ASSAY OF CALCIUM: CPT

## 2021-02-23 PROCEDURE — 82962 GLUCOSE BLOOD TEST: CPT

## 2021-02-23 PROCEDURE — 6370000000 HC RX 637 (ALT 250 FOR IP): Performed by: INTERNAL MEDICINE

## 2021-02-23 PROCEDURE — 80048 BASIC METABOLIC PNL TOTAL CA: CPT

## 2021-02-23 PROCEDURE — 99233 SBSQ HOSP IP/OBS HIGH 50: CPT | Performed by: INTERNAL MEDICINE

## 2021-02-23 PROCEDURE — 36415 COLL VENOUS BLD VENIPUNCTURE: CPT

## 2021-02-23 PROCEDURE — 2580000003 HC RX 258: Performed by: INTERNAL MEDICINE

## 2021-02-23 PROCEDURE — 83735 ASSAY OF MAGNESIUM: CPT

## 2021-02-23 PROCEDURE — 6360000002 HC RX W HCPCS: Performed by: INTERNAL MEDICINE

## 2021-02-23 PROCEDURE — 2060000000 HC ICU INTERMEDIATE R&B

## 2021-02-23 RX ORDER — DEXTROSE, SODIUM CHLORIDE, AND POTASSIUM CHLORIDE 5; .9; .15 G/100ML; G/100ML; G/100ML
INJECTION INTRAVENOUS CONTINUOUS
Status: DISCONTINUED | OUTPATIENT
Start: 2021-02-23 | End: 2021-02-24

## 2021-02-23 RX ADMIN — TIMOLOL MALEATE 1 DROP: 5 SOLUTION/ DROPS OPHTHALMIC at 08:50

## 2021-02-23 RX ADMIN — INSULIN LISPRO 2 UNITS: 100 INJECTION, SOLUTION INTRAVENOUS; SUBCUTANEOUS at 17:58

## 2021-02-23 RX ADMIN — INSULIN LISPRO 2 UNITS: 100 INJECTION, SOLUTION INTRAVENOUS; SUBCUTANEOUS at 01:40

## 2021-02-23 RX ADMIN — BRIMONIDINE TARTRATE 1 DROP: 2 SOLUTION OPHTHALMIC at 08:50

## 2021-02-23 RX ADMIN — TIMOLOL MALEATE 1 DROP: 5 SOLUTION/ DROPS OPHTHALMIC at 20:29

## 2021-02-23 RX ADMIN — PHENOL 1 SPRAY: 1.5 LIQUID ORAL at 08:50

## 2021-02-23 RX ADMIN — PHENOL 1 SPRAY: 1.5 LIQUID ORAL at 22:17

## 2021-02-23 RX ADMIN — INSULIN LISPRO 4 UNITS: 100 INJECTION, SOLUTION INTRAVENOUS; SUBCUTANEOUS at 07:03

## 2021-02-23 RX ADMIN — INSULIN LISPRO 2 UNITS: 100 INJECTION, SOLUTION INTRAVENOUS; SUBCUTANEOUS at 13:52

## 2021-02-23 RX ADMIN — INSULIN LISPRO 2 UNITS: 100 INJECTION, SOLUTION INTRAVENOUS; SUBCUTANEOUS at 10:41

## 2021-02-23 RX ADMIN — PHENOL 1 SPRAY: 1.5 LIQUID ORAL at 15:20

## 2021-02-23 RX ADMIN — PHENOL 1 SPRAY: 1.5 LIQUID ORAL at 01:36

## 2021-02-23 RX ADMIN — MORPHINE SULFATE 1 MG: 2 INJECTION, SOLUTION INTRAMUSCULAR; INTRAVENOUS at 15:27

## 2021-02-23 RX ADMIN — MORPHINE SULFATE 1 MG: 2 INJECTION, SOLUTION INTRAMUSCULAR; INTRAVENOUS at 20:40

## 2021-02-23 RX ADMIN — PHENOL 1 SPRAY: 1.5 LIQUID ORAL at 17:58

## 2021-02-23 RX ADMIN — LATANOPROST 1 DROP: 50 SOLUTION OPHTHALMIC at 20:30

## 2021-02-23 RX ADMIN — SODIUM CHLORIDE, PRESERVATIVE FREE 10 ML: 5 INJECTION INTRAVENOUS at 15:27

## 2021-02-23 RX ADMIN — INSULIN LISPRO 2 UNITS: 100 INJECTION, SOLUTION INTRAVENOUS; SUBCUTANEOUS at 22:17

## 2021-02-23 RX ADMIN — POTASSIUM CHLORIDE, DEXTROSE MONOHYDRATE AND SODIUM CHLORIDE: 150; 5; 900 INJECTION, SOLUTION INTRAVENOUS at 14:13

## 2021-02-23 RX ADMIN — BRIMONIDINE TARTRATE 1 DROP: 2 SOLUTION OPHTHALMIC at 15:20

## 2021-02-23 ASSESSMENT — PAIN SCALES - GENERAL
PAINLEVEL_OUTOF10: 7
PAINLEVEL_OUTOF10: 0

## 2021-02-23 ASSESSMENT — PAIN DESCRIPTION - ONSET: ONSET: PROGRESSIVE

## 2021-02-23 ASSESSMENT — PAIN DESCRIPTION - PAIN TYPE: TYPE: ACUTE PAIN

## 2021-02-23 ASSESSMENT — PAIN DESCRIPTION - FREQUENCY: FREQUENCY: INTERMITTENT

## 2021-02-23 ASSESSMENT — PAIN DESCRIPTION - PROGRESSION: CLINICAL_PROGRESSION: NOT CHANGED

## 2021-02-23 NOTE — PROGRESS NOTES
Department of Internal Medicine  Nephrology Attending Progress Note        SUBJECTIVE: Mr Zay Jeronimo lying in bed. Seems chest pain improved after repositioning of his NG tube, surgery postponed till tomorrow.,  Some improvement in renal parameters. No bowel movements.     Physical Exam:    Vitals:    02/23/21 0845   BP: (!) 149/67   Pulse: 77   Resp: 18   Temp: 97.6 °F (36.4 °C)   SpO2: 90%       I/O last 24 hours:  Intake/Output inaccurate intake/1450    Weight: Not done    General Appearance:  awake, alert, oriented, in mild distress, NG draining some blood-tinged fluid  Skin: No rash  Neck:  neck- supple, no mass, non-tender and no bruits  Lungs: Decreased breath sounds at bases  Heart: Regular rhythm     Abdominal: Decreased bowel sounds  Extremities: No edema  Peripheral Pulses:  +2    DATA:    CBC with Differential:    Lab Results   Component Value Date    WBC 8.4 02/22/2021    RBC 4.32 02/22/2021    HGB 13.8 02/22/2021    HCT 40.4 02/22/2021     02/22/2021    MCV 93.5 02/22/2021    MCH 31.9 02/22/2021    MCHC 34.2 02/22/2021    RDW 13.2 02/22/2021    NRBC 0.0 02/21/2021    SEGSPCT 56 02/03/2014    LYMPHOPCT 11.3 02/21/2021    MONOPCT 17.4 02/21/2021    BASOPCT 0.0 02/21/2021    MONOSABS 1.44 02/21/2021    LYMPHSABS 1.87 02/21/2021    EOSABS 0.00 02/21/2021    BASOSABS 0.00 02/21/2021     BMP:    Lab Results   Component Value Date     02/23/2021    K 3.3 02/23/2021    K 4.2 02/21/2021     02/23/2021    CO2 24 02/23/2021    BUN 83 02/23/2021    LABALBU 4.2 02/20/2021    CREATININE 3.3 02/23/2021    CALCIUM 7.9 02/23/2021    GFRAA 22 02/23/2021    LABGLOM 22 02/23/2021    GLUCOSE 183 02/23/2021     Protein creatinine ratio 0.2       ceFAZolin  2,000 mg Intravenous See Admin Instructions    carvedilol  25 mg Oral BID WC    hydrALAZINE  25 mg Oral TID    isosorbide mononitrate  30 mg Oral Daily    atorvastatin  10 mg Oral Daily    brimonidine  1 drop Left Eye BID    latanoprost  1 drop Left Eye Nightly    timolol  1 drop Left Eye BID    sodium chloride flush  10 mL Intravenous 2 times per day    enoxaparin  30 mg Subcutaneous Daily    insulin lispro  0-12 Units Subcutaneous Q4H    influenza virus vaccine  0.5 mL Intramuscular Prior to discharge      dextrose 5 % and 0.9 % NaCl 100 mL/hr at 02/22/21 1844    dextrose       morphine, phenol, trimethobenzamide, ipratropium-albuterol, nitroGLYCERIN, sodium chloride flush, polyethylene glycol, acetaminophen **OR** acetaminophen, glucose, dextrose, glucagon (rDNA), dextrose, hydrALAZINE    IMPRESSION/RECOMMENDATIONS:      Acute kidney injury superimposed on CKD 3-A, nonoliguric, creatinine improving  Hypokalemia add some potassium to IV fluids  Hypocalcemia check magnesium level  Hypertension better control  Small bowel obstruction as per surgery  Type 2 diabetes minimal proteinuria noted      Shaylee Benito MD  2/23/2021 1:03 PM

## 2021-02-23 NOTE — PROGRESS NOTES
Went and spoke with patient. Updated him that surgery has been moved to tomorrow due to scheduling issues. He was upset but understood. All questions and concerns were addressed. Electronically signed by Jennifer Canseco MD on 2/23/2021 at 3:40 PM     The patient was seen and examined and the chart was reviewed. The patient will undergo an exploratory laparotomy tomorrow. I have informed him of the risk, benefits and complications including the possibility of a small bowel resection, large bowel resection and ostomy and he is willing to proceed.

## 2021-02-23 NOTE — ANESTHESIA PRE PROCEDURE
Department of Anesthesiology  Preprocedure Note       Name:  Basim Maxwell   Age:  79 y.o.  :  1940                                          MRN:  [id number]         Date:  2021      Surgeon: Bryant Ledbetter     Procedure: DIAGONOSTIC LAPAROSCOPY, POSS LAPAROTOMY, POSS SBR, POSS OSTOMY    Medications prior to admission:   Prior to Admission medications    Medication Sig Start Date End Date Taking? Authorizing Provider   hydrALAZINE (APRESOLINE) 25 MG tablet Take 1 tablet by mouth 3 times daily Use if blood pressure is greater than 180/100 20  Yes Christaine Sandoval MD   insulin NPH (NOVOLIN N) 100 UNIT/ML injection vial Inject 10 Units into the skin every evening. 14  Yes Ayse Junior MD   insulin NPH (NOVOLIN N) 100 UNIT/ML injection vial Inject 12 Units into the skin every morning. 14  Yes Ayse Junior MD   carvedilol (COREG) 25 MG tablet Take 25 mg by mouth 2 times daily (with meals). Yes Historical Provider, MD   sertraline (ZOLOFT) 25 MG tablet Take 25 mg by mouth daily. Yes Historical Provider, MD   cloNIDine (CATAPRES) 0.2 MG/24HR Place 2 patches onto the skin once a week. Changed on    Yes Historical Provider, MD   aspirin 325 MG EC tablet Take 325 mg by mouth daily. Yes Historical Provider, MD   Omega 3-6-9 Fatty Acids (TRIPLE OMEGA-3-6-9 PO) Take 1 tablet by mouth 2 times daily. Yes Historical Provider, MD   simethicone (MYLICON) 80 MG chewable tablet Take 80 mg by mouth every 6 hours as needed for Flatulence. Yes Historical Provider, MD   brimonidine (ALPHAGAN) 0.2 % ophthalmic solution Place 1 drop into the left eye 2 times daily. Yes Historical Provider, MD   timolol (TIMOPTIC) 0.5 % ophthalmic solution Place 1 drop into the left eye 2 times daily. Yes Historical Provider, MD   ferrous sulfate 325 (65 FE) MG tablet Take 1 tablet by mouth 2 times daily (with meals).  13  Yes Rosalinda Corley MD   ipratropium-albuterol (DUONEB) 0.5-2.5 (3) MG/3ML SOLN nebulizer solution  mL/hr at 02/22/21 1844 New Bag at 02/22/21 1844    phenol 1.4 % mouth spray 1 spray  1 spray Mouth/Throat Q2H PRN Neeru Reagan MD   1 spray at 02/22/21 1759    trimethobenzamide (TIGAN) injection 200 mg  200 mg Intramuscular Q6H PRN Neeru Reagan MD   200 mg at 02/22/21 2114    brimonidine (ALPHAGAN) 0.2 % ophthalmic solution 1 drop  1 drop Left Eye BID Janette Hidalgo MD   1 drop at 02/22/21 1542    ipratropium-albuterol (DUONEB) nebulizer solution 3 mL  3 mL Inhalation Q6H PRN Janette Hidalgo MD        latanoprost (XALATAN) 0.005 % ophthalmic solution 1 drop  1 drop Left Eye Nightly Janette Hidalgo MD   1 drop at 02/22/21 2118    nitroGLYCERIN (NITROSTAT) SL tablet 0.4 mg  0.4 mg Sublingual Q5 Min PRN Janette Hidalgo MD        timolol (TIMOPTIC) 0.5 % ophthalmic solution 1 drop  1 drop Left Eye BID Janette Hidalgo MD   1 drop at 02/22/21 2118    sodium chloride flush 0.9 % injection 10 mL  10 mL Intravenous 2 times per day Brianna Maciel MD        sodium chloride flush 0.9 % injection 10 mL  10 mL Intravenous PRN Janette Hidalgo MD        enoxaparin (LOVENOX) injection 30 mg  30 mg Subcutaneous Daily Janette Hidalgo MD   30 mg at 02/22/21 0814    polyethylene glycol (GLYCOLAX) packet 17 g  17 g Oral Daily PRN Janette Hidalgo MD        acetaminophen (TYLENOL) tablet 650 mg  650 mg Oral Q6H PRN Janette Hidalgo MD   650 mg at 02/21/21 1539    Or    acetaminophen (TYLENOL) suppository 650 mg  650 mg Rectal Q6H PRN Janette Hidalgo MD        glucose (GLUTOSE) 40 % oral gel 15 g  15 g Oral PRN Janette Hidalgo MD        dextrose 50 % IV solution  12.5 g Intravenous PRN Janette Hidalgo MD        glucagon (rDNA) injection 1 mg  1 mg Intramuscular PRN Janette Hidalgo MD        dextrose 5 % solution  100 mL/hr Intravenous PRN Janette Hidalgo MD        insulin lispro (HUMALOG) injection vial 0-12 Units  0-12 Units Subcutaneous Q4H Janette Hidalgo MD   2 Units at 02/22/21 1745    hydrALAZINE (APRESOLINE) injection 10 mg  10 mg Intravenous Q6H PRN Janette Hidalgo MD   10 mg at 02/21/21 0848    influenza A&B vaccine (FLUAD QUADRIVALENT) injection 0.5 mL  0.5 mL Intramuscular Prior to discharge Jorge Holland MD           Allergies: Allergies   Allergen Reactions    Aldactone [Spironolactone] Other (See Comments)     Hyperkalemia--4/16/15       Problem List:    Patient Active Problem List   Diagnosis Code    COPD exacerbation ([facility] J44.1    Chest pain R07.9    Hypertension I10    Coronary artery disease I25.10    Diabetes mellitus type 2, controlled ([address] E11.9    Hyperlipidemia E78.5    BPH (benign prostatic hyperplasia) N40.0    Hyperkalemia E87.5    Blind right eye H54.40    CKD (chronic kidney disease) stage 3, GFR 30-59 ml/min N18.30    CAD (coronary artery disease) I25.10    CHF (congestive heart failure) ([facility]) I50.9    Chronic respiratory failure ([facility]) J96.10    EMELY (acute kidney injury) ([facility] N17.9    Intestinal obstruction ([facility] K56.609       Past Medical History:        Diagnosis Date    Blind right eye     CAD (coronary artery disease)     1994 CABG x 4    CHF (congestive heart failure) ([facility])     Chronic respiratory failure ([address] 9/22/2014    CKD (chronic kidney disease) stage 3, GFR 30-59 ml/min ([facility])     Diabetes mellitus ([address]     Diabetes mellitus type 2, controlled ([address] 5/23/2013    HLD (hyperlipidemia)     Hypertension     TIA (transient ischemic attack)        Past Surgical History:        Procedure Laterality Date    CARDIAC SURGERY      CORONARY ARTERY BYPASS GRAFT  1994    x4    ECHO COMPL W DOP COLOR FLOW  5/23/2013         ECHO COMPL W DOP COLOR FLOW  5/23/2013         EYE SURGERY         Social History:    Social History     Tobacco Use    Smoking status: Former Smoker     Years: 2.00    Smokeless tobacco: Former User     Quit date: 8/12/1984   Substance Use Topics    Alcohol use:  No                                Counseling given: Not Answered      Vital Signs (Current):   Vitals:    02/21/21 2034 02/22/21 0800 02/22/21 1546 02/22/21 2124   BP: (!) 102/51 118/60 (!) 142/63 (!) 161/72   Pulse: 86 80 78 82   Resp: 18 18 18 18   Temp: 98.1 °F (36.7 °C) 97.9 °F (36.6 °C) 98.5 °F (36.9 °C) 98.8 °F (37.1 °C)   TempSrc: Oral Oral Oral Oral   SpO2: 95% 92% 95%    Weight:       Height:                                                  BP Readings from Last 3 Encounters:   02/22/21 (!) 161/72   12/31/20 (!) 178/80       NPO Status:                                                                                 BMI:   Wt Readings from Last 3 Encounters:   02/20/21 190 lb (86.2 kg)   12/30/20 205 lb (93 kg)     Body mass index is 26.5 kg/m². CBC:   Lab Results   Component Value Date    WBC 8.4 02/22/2021    RBC 4.32 02/22/2021    HGB 13.8 02/22/2021    HCT 40.4 02/22/2021    MCV 93.5 02/22/2021    RDW 13.2 02/22/2021     02/22/2021       CMP:   Lab Results   Component Value Date     02/22/2021    K 3.7 02/22/2021    K 4.2 02/21/2021    CL 98 02/22/2021    CO2 26 02/22/2021    BUN 82 02/22/2021    CREATININE 4.3 02/22/2021    GFRAA 16 02/22/2021    LABGLOM 16 02/22/2021    GLUCOSE 198 02/22/2021    PROT 7.2 02/20/2021    CALCIUM 8.2 02/22/2021    BILITOT 0.7 02/20/2021    ALKPHOS 56 02/20/2021    AST 24 02/20/2021    ALT 19 02/20/2021       POC Tests: No results for input(s): POCGLU, POCNA, POCK, POCCL, POCBUN, POCHEMO, POCHCT in the last 72 hours.     Coags:   Lab Results   Component Value Date    PROTIME 13.0 02/20/2021    INR 1.1 02/20/2021    APTT 30.0 02/20/2021       HCG (If Applicable): No results found for: PREGTESTUR, PREGSERUM, HCG, HCGQUANT     ABGs: No results found for: PHART, PO2ART, NWY4TTR, APO4VUJ, BEART, H7OXHVCT     Type & Screen (If Applicable):  No results found for: LABABO, LABRH    Drug/Infectious Status (If Applicable):  No results found for: HIV, HEPCAB    COVID-19 Screening (If Applicable):   Lab Results   Component Value Date    COVID19 Not Detected 02/20/2021    COVID19 Not Detected 12/31/2020         Anesthesia Evaluation  Patient summary reviewed  Airway: Mallampati: II  TM distance: >3 FB   Neck ROM: full  Mouth opening: > = 3 FB Dental:    (+) edentulous      Pulmonary: breath sounds clear to auscultation  (+) COPD:  shortness of breath: chronic,  decreased breath sounds,                            ROS comment: Former smoker   Cardiovascular:    (+) hypertension:, CAD: obstructive, CABG/stent: no interval change, CHF: systolic, hyperlipidemia      ECG reviewed  Rhythm: regular  Rate: normal  Echocardiogram reviewed                  Neuro/Psych:   (+) CVA: residual symptoms, TIA,              ROS comment: Glaucoma right eye-Blind GI/Hepatic/Renal:   (+) renal disease: CRI,          ROS comment: Intestinal obstruction. Endo/Other:    (+) DiabetesType II DM, , .                 Abdominal:           Vascular:   + PVD, aortic or cerebral, . Anesthesia Plan      general     ASA 4       Induction: intravenous. BIS  MIPS: Postoperative opioids intended and Prophylactic antiemetics administered. Anesthetic plan and risks discussed with patient.                       Sara Shen MD   2/22/2021     Confirmation of above and final plan per Day of Surgery (DOS) anesthesiologist.

## 2021-02-23 NOTE — PROGRESS NOTES
respiratory distress  Cardiovascular: normal rate, normal S1 and S2 and no carotid bruits  Abdomen: soft, non-tender, mildly distended, normal bowel sounds, no masses or organomegaly  Extremities: no cyanosis, no clubbing and no edema  Neurologic: no cranial nerve deficit and speech normal        Recent Labs     02/21/21  0430 02/22/21  0525 02/23/21  0300    137 140   K 4.2 3.7 3.3*   CL 96* 98 104   CO2 28 26 24   BUN 72* 82* 83*   CREATININE 3.5* 4.3* 3.3*   GLUCOSE 140* 198* 183*   CALCIUM 8.9 8.2* 7.9*       Recent Labs     02/20/21  1819 02/21/21  0430 02/22/21  0525   WBC 10.5 8.5 8.4   RBC 4.55 4.42 4.32   HGB 14.7 14.1 13.8   HCT 41.9 42.2 40.4   MCV 92.1 95.5 93.5   MCH 32.3 31.9 31.9   MCHC 35.1* 33.4 34.2   RDW 13.3 13.5 13.2    166 164   MPV 10.2 9.7 9.7         Assessment:    Active Problems:    Intestinal obstruction (HCC)  Resolved Problems:    * No resolved hospital problems. *      Plan:  1. SBO, NGT, NPO, appreciate surgery input, IVF, possible ex-lap if no improvement. Repeated CT abd showed no changes with high grade SBO, bowel resection on 2/24/21  2. ARF presented with cr at 3.5, up from 1.4 2 months ago, most likley prerenal due to SBO, start IVF with no improvement, cr is up to 4.3, nephrology was consulted, appreciate input, cr is down to 3. 3. continue IVF  3. Hx of CAD s/p CABG, no chest pain, continue on coreg, stating and imdur, holdng asa for possible surgery. 4.  DM II, hold oral hypoglycemic agents and start on SSI, switch IVF to d5NS  5.  HTN, BP is not controlled, continue on clonidine patch, restart coreg and imdur, IV hydralazine prn.  6.  Back pain, start morpohine      NOTE: This report was transcribed using voice recognition software. Every effort was made to ensure accuracy; however, inadvertent computerized transcription errors may be present.   Electronically signed by Cameron Murphy MD on 2/23/2021 at 1:38 PM

## 2021-02-23 NOTE — PROGRESS NOTES
Called and notified Dr. Rocio Avila regarding pt's abnormal lab result K+ at 20-23-41-52 and No new order. Continue to monitor pt's condition.

## 2021-02-23 NOTE — PROGRESS NOTES
Spoke to Dr. Bette Bailey in person regarding insulin coverage and procedure tomorrow. Per Dr. Amos Lynn- okay to give full sliding scale coverage. Give 1/2 of sliding scale humalog coverage starting tonight at 2215.

## 2021-02-23 NOTE — PROGRESS NOTES
Occupational Therapy  Hold therapy today. Patient scheduled for surgery tomorrow. Will monitor for need of OT reevaluation.    Anna Arnold HORTENCIA/L [id number]

## 2021-02-24 ENCOUNTER — APPOINTMENT (OUTPATIENT)
Dept: MRI IMAGING | Age: 81
DRG: 335 | End: 2021-02-24
Payer: OTHER GOVERNMENT

## 2021-02-24 ENCOUNTER — APPOINTMENT (OUTPATIENT)
Dept: CT IMAGING | Age: 81
DRG: 335 | End: 2021-02-24
Payer: OTHER GOVERNMENT

## 2021-02-24 LAB
ANION GAP SERPL CALCULATED.3IONS-SCNC: 7 MMOL/L (ref 7–16)
ANION GAP SERPL CALCULATED.3IONS-SCNC: 8 MMOL/L (ref 7–16)
BUN BLDV-MCNC: 35 MG/DL (ref 8–23)
BUN BLDV-MCNC: 58 MG/DL (ref 8–23)
CALCIUM SERPL-MCNC: 8.2 MG/DL (ref 8.6–10.2)
CALCIUM SERPL-MCNC: 8.5 MG/DL (ref 8.6–10.2)
CHLORIDE BLD-SCNC: 113 MMOL/L (ref 98–107)
CHLORIDE BLD-SCNC: 119 MMOL/L (ref 98–107)
CO2: 28 MMOL/L (ref 22–29)
CO2: 28 MMOL/L (ref 22–29)
CREAT SERPL-MCNC: 1.3 MG/DL (ref 0.7–1.2)
CREAT SERPL-MCNC: 1.8 MG/DL (ref 0.7–1.2)
GFR AFRICAN AMERICAN: 44
GFR AFRICAN AMERICAN: >60
GFR NON-AFRICAN AMERICAN: 44 ML/MIN/1.73
GFR NON-AFRICAN AMERICAN: >60 ML/MIN/1.73
GLUCOSE BLD-MCNC: 193 MG/DL (ref 74–99)
GLUCOSE BLD-MCNC: 273 MG/DL (ref 74–99)
METER GLUCOSE: 163 MG/DL (ref 74–99)
METER GLUCOSE: 166 MG/DL (ref 74–99)
METER GLUCOSE: 189 MG/DL (ref 74–99)
METER GLUCOSE: 212 MG/DL (ref 74–99)
METER GLUCOSE: 242 MG/DL (ref 74–99)
METER GLUCOSE: 242 MG/DL (ref 74–99)
POTASSIUM SERPL-SCNC: 3.7 MMOL/L (ref 3.5–5)
POTASSIUM SERPL-SCNC: 4.2 MMOL/L (ref 3.5–5)
SODIUM BLD-SCNC: 149 MMOL/L (ref 132–146)
SODIUM BLD-SCNC: 154 MMOL/L (ref 132–146)

## 2021-02-24 PROCEDURE — 6360000002 HC RX W HCPCS: Performed by: INTERNAL MEDICINE

## 2021-02-24 PROCEDURE — 6360000004 HC RX CONTRAST MEDICATION: Performed by: STUDENT IN AN ORGANIZED HEALTH CARE EDUCATION/TRAINING PROGRAM

## 2021-02-24 PROCEDURE — 2500000003 HC RX 250 WO HCPCS: Performed by: FAMILY MEDICINE

## 2021-02-24 PROCEDURE — 2500000003 HC RX 250 WO HCPCS: Performed by: INTERNAL MEDICINE

## 2021-02-24 PROCEDURE — 2060000000 HC ICU INTERMEDIATE R&B

## 2021-02-24 PROCEDURE — 6370000000 HC RX 637 (ALT 250 FOR IP): Performed by: FAMILY MEDICINE

## 2021-02-24 PROCEDURE — 36415 COLL VENOUS BLD VENIPUNCTURE: CPT

## 2021-02-24 PROCEDURE — 70553 MRI BRAIN STEM W/O & W/DYE: CPT

## 2021-02-24 PROCEDURE — 80048 BASIC METABOLIC PNL TOTAL CA: CPT

## 2021-02-24 PROCEDURE — 2580000003 HC RX 258: Performed by: INTERNAL MEDICINE

## 2021-02-24 PROCEDURE — A9579 GAD-BASE MR CONTRAST NOS,1ML: HCPCS | Performed by: STUDENT IN AN ORGANIZED HEALTH CARE EDUCATION/TRAINING PROGRAM

## 2021-02-24 PROCEDURE — 2700000000 HC OXYGEN THERAPY PER DAY

## 2021-02-24 PROCEDURE — 99233 SBSQ HOSP IP/OBS HIGH 50: CPT | Performed by: INTERNAL MEDICINE

## 2021-02-24 PROCEDURE — 6370000000 HC RX 637 (ALT 250 FOR IP): Performed by: INTERNAL MEDICINE

## 2021-02-24 PROCEDURE — 82962 GLUCOSE BLOOD TEST: CPT

## 2021-02-24 PROCEDURE — 70450 CT HEAD/BRAIN W/O DYE: CPT

## 2021-02-24 RX ORDER — LABETALOL HYDROCHLORIDE 5 MG/ML
30 INJECTION, SOLUTION INTRAVENOUS EVERY 6 HOURS
Status: DISCONTINUED | OUTPATIENT
Start: 2021-02-25 | End: 2021-03-03

## 2021-02-24 RX ORDER — LABETALOL HYDROCHLORIDE 5 MG/ML
10 INJECTION, SOLUTION INTRAVENOUS EVERY 4 HOURS PRN
Status: DISCONTINUED | OUTPATIENT
Start: 2021-02-24 | End: 2021-02-24

## 2021-02-24 RX ORDER — ASPIRIN 300 MG/1
300 SUPPOSITORY RECTAL ONCE
Status: COMPLETED | OUTPATIENT
Start: 2021-02-24 | End: 2021-02-24

## 2021-02-24 RX ORDER — LABETALOL HYDROCHLORIDE 5 MG/ML
20 INJECTION, SOLUTION INTRAVENOUS EVERY 6 HOURS
Status: DISCONTINUED | OUTPATIENT
Start: 2021-02-24 | End: 2021-02-24

## 2021-02-24 RX ORDER — DEXTROSE AND SODIUM CHLORIDE 5; .45 G/100ML; G/100ML
INJECTION, SOLUTION INTRAVENOUS CONTINUOUS
Status: DISCONTINUED | OUTPATIENT
Start: 2021-02-24 | End: 2021-02-25

## 2021-02-24 RX ADMIN — PHENOL 1 SPRAY: 1.5 LIQUID ORAL at 04:37

## 2021-02-24 RX ADMIN — LABETALOL HYDROCHLORIDE 10 MG: 5 INJECTION INTRAVENOUS at 13:05

## 2021-02-24 RX ADMIN — DEXTROSE AND SODIUM CHLORIDE: 5; 450 INJECTION, SOLUTION INTRAVENOUS at 23:04

## 2021-02-24 RX ADMIN — BRIMONIDINE TARTRATE 1 DROP: 2 SOLUTION OPHTHALMIC at 09:09

## 2021-02-24 RX ADMIN — ASPIRIN 300 MG: 300 SUPPOSITORY RECTAL at 23:38

## 2021-02-24 RX ADMIN — INSULIN LISPRO 2 UNITS: 100 INJECTION, SOLUTION INTRAVENOUS; SUBCUTANEOUS at 06:44

## 2021-02-24 RX ADMIN — DEXTROSE AND SODIUM CHLORIDE: 5; 450 INJECTION, SOLUTION INTRAVENOUS at 12:52

## 2021-02-24 RX ADMIN — INSULIN LISPRO 2 UNITS: 100 INJECTION, SOLUTION INTRAVENOUS; SUBCUTANEOUS at 02:32

## 2021-02-24 RX ADMIN — POTASSIUM CHLORIDE, DEXTROSE MONOHYDRATE AND SODIUM CHLORIDE: 150; 5; 900 INJECTION, SOLUTION INTRAVENOUS at 01:03

## 2021-02-24 RX ADMIN — LABETALOL HYDROCHLORIDE 30 MG: 5 INJECTION INTRAVENOUS at 23:04

## 2021-02-24 RX ADMIN — INSULIN LISPRO 4 UNITS: 100 INJECTION, SOLUTION INTRAVENOUS; SUBCUTANEOUS at 19:57

## 2021-02-24 RX ADMIN — TIMOLOL MALEATE 1 DROP: 5 SOLUTION/ DROPS OPHTHALMIC at 09:09

## 2021-02-24 RX ADMIN — TIMOLOL MALEATE 1 DROP: 5 SOLUTION/ DROPS OPHTHALMIC at 22:09

## 2021-02-24 RX ADMIN — MORPHINE SULFATE 1 MG: 2 INJECTION, SOLUTION INTRAMUSCULAR; INTRAVENOUS at 01:03

## 2021-02-24 RX ADMIN — LABETALOL HYDROCHLORIDE 20 MG: 5 INJECTION INTRAVENOUS at 17:10

## 2021-02-24 RX ADMIN — GADOTERIDOL 17 ML: 279.3 INJECTION, SOLUTION INTRAVENOUS at 21:53

## 2021-02-24 RX ADMIN — MORPHINE SULFATE 1 MG: 2 INJECTION, SOLUTION INTRAMUSCULAR; INTRAVENOUS at 16:54

## 2021-02-24 RX ADMIN — HYDRALAZINE HYDROCHLORIDE 10 MG: 20 INJECTION INTRAMUSCULAR; INTRAVENOUS at 09:01

## 2021-02-24 RX ADMIN — LATANOPROST 1 DROP: 50 SOLUTION OPHTHALMIC at 22:09

## 2021-02-24 RX ADMIN — CALCIUM GLUCONATE 1000 MG: 98 INJECTION, SOLUTION INTRAVENOUS at 16:56

## 2021-02-24 RX ADMIN — MORPHINE SULFATE 1 MG: 2 INJECTION, SOLUTION INTRAMUSCULAR; INTRAVENOUS at 05:26

## 2021-02-24 RX ADMIN — BRIMONIDINE TARTRATE 1 DROP: 2 SOLUTION OPHTHALMIC at 16:59

## 2021-02-24 RX ADMIN — PHENOL 1 SPRAY: 1.5 LIQUID ORAL at 01:03

## 2021-02-24 ASSESSMENT — PAIN DESCRIPTION - ORIENTATION: ORIENTATION: RIGHT;MID;LEFT

## 2021-02-24 ASSESSMENT — PAIN SCALES - GENERAL
PAINLEVEL_OUTOF10: 10
PAINLEVEL_OUTOF10: 10
PAINLEVEL_OUTOF10: 3
PAINLEVEL_OUTOF10: 7

## 2021-02-24 ASSESSMENT — PAIN DESCRIPTION - DESCRIPTORS: DESCRIPTORS: BURNING;DISCOMFORT

## 2021-02-24 ASSESSMENT — PAIN DESCRIPTION - ONSET: ONSET: GRADUAL

## 2021-02-24 NOTE — PROGRESS NOTES
Went into re-assess vision in left eye. Pt now states that he can see shades of light and dark but is still unable to identify any objects or shapes.

## 2021-02-24 NOTE — CARE COORDINATION
Social Work/Discharge Planning:  Jacoby Watt with [facility] states patient is 60% connected with the [facility]. Jacoby Watt states the [facility] should have been contacted within 72 hours of admission. Patient is outside of 72 hour window, so the [facility] may not consider payment of hospitalization. Jacoby Watt states patient admission diagnosis does not qualify him for a rehab. Jacoby Watt states to confirm with patient if he has Medicare Part A. She state if he does not have Medicare Part A, then they can try to get a financial acceptance.  confirmed with patient and he is unsure if he has Medicare Part A. Called Jacoby Watt at [facility] (ph: [phone]) and informed her that [address] with UR notified Chastity Talavera with the [facility] on 2/22 of patient admission. Informed Jacoby Watt of patient statement regarding unsure if he has Medicare. Jacoby Watt will confirm if patient qualifies for snf rehab. Will continue to follow.   Electronically signed by ALBAN Mcintosh on 2/24/2021 at 2:08 PM

## 2021-02-24 NOTE — PROGRESS NOTES
[facility] Progress Note    Admitting Date and Time: 2/20/2021  5:15 PM  Admit Dx: Intestinal obstruction (HCC) [K56.609]    Subjective:  Patient is being followed for Intestinal obstruction ([address] [K56.609]   Pt reported losing his vision in his left eye, (he is blind in the right eye), BP in the 190's, no other focal complaints, NGT to wall suction    ROS: denies fever, chills, cp, sob, HA unless stated above.       ceFAZolin  2,000 mg Intravenous See Admin Instructions    carvedilol  25 mg Oral BID WC    hydrALAZINE  25 mg Oral TID    isosorbide mononitrate  30 mg Oral Daily    atorvastatin  10 mg Oral Daily    brimonidine  1 drop Left Eye BID    latanoprost  1 drop Left Eye Nightly    timolol  1 drop Left Eye BID    sodium chloride flush  10 mL Intravenous 2 times per day    enoxaparin  30 mg Subcutaneous Daily    insulin lispro  0-12 Units Subcutaneous Q4H    influenza virus vaccine  0.5 mL Intramuscular Prior to discharge         labetalol, 10 mg, Q4H PRN      morphine, 1 mg, Q4H PRN      phenol, 1 spray, Q2H PRN      trimethobenzamide, 200 mg, Q6H PRN      ipratropium-albuterol, 3 mL, Q6H PRN      nitroGLYCERIN, 0.4 mg, Q5 Min PRN      sodium chloride flush, 10 mL, PRN      polyethylene glycol, 17 g, Daily PRN      acetaminophen, 650 mg, Q6H PRN    Or      acetaminophen, 650 mg, Q6H PRN      glucose, 15 g, PRN      dextrose, 12.5 g, PRN      glucagon (rDNA), 1 mg, PRN      dextrose, 100 mL/hr, PRN      hydrALAZINE, 10 mg, Q6H PRN         Objective:    BP (!) 183/76   Pulse 86   Temp 99.2 °F (37.3 °C) (Oral)   Resp 18   Ht 5' 11\" (1.803 m)   Wt 190 lb (86.2 kg)   SpO2 93%   BMI 26.50 kg/m²     General Appearance: alert and oriented to person, place and time   Skin: warm and dry  Head: normocephalic and atraumatic  Eyes: pupils equal, round, and reactive to light, extraocular eye movements intact, conjunctivae normal  Neck: neck supple and non tender without mass   Pulmonary/Chest: clear to auscultation bilaterally- no wheezes, rales or rhonchi, normal air movement, no respiratory distress  Cardiovascular: normal rate, normal S1 and S2 and no carotid bruits  Abdomen: soft, non-tender, mildly distended, normal bowel sounds, no masses or organomegaly  Extremities: no cyanosis, no clubbing and no edema  Neurologic: blurry vision left eye and speech normal        Recent Labs     02/22/21  0525 02/23/21  0300 02/24/21  0230    140 149*   K 3.7 3.3* 3.7   CL 98 104 113*   CO2 26 24 28   BUN 82* 83* 58*   CREATININE 4.3* 3.3* 1.8*   GLUCOSE 198* 183* 193*   CALCIUM 8.2* 7.9* 8.2*       Recent Labs     02/22/21  0525   WBC 8.4   RBC 4.32   HGB 13.8   HCT 40.4   MCV 93.5   MCH 31.9   MCHC 34.2   RDW 13.2      MPV 9.7         Assessment:    Active Problems:    Intestinal obstruction (HCC)  Resolved Problems:    * No resolved hospital problems. *      Plan:  1. SBO, NGT, NPO, appreciate surgery input, IVF, possible ex-lap if no improvement. Repeated CT abd showed no changes with high grade SBO, bowel resection got delayed due to elevated blood pressure and left eye vision loss  2. ARF presented with cr at 3.5, up from 1.4 2 months ago, most likley prerenal due to SBO, start IVF with no improvement, cr is up to 4.3, nephrology was consulted, appreciate input, cr is down to 3. 3. continue IVF  3. Hx of CAD s/p CABG, no chest pain, continue on coreg, stating and imdur, holdng asa for possible surgery. 4.  DM II, hold oral hypoglycemic agents and start on SSI, switch IVF to d5NS  5.  HTN, BP is not controlled, continue on clonidine patch, restart coreg and imdur, IV hydralazine prn. And IV labetalol  6. Back pain, start morpohine  7. Left eye vision loss, with uncontrolled BP, need to rule out stroke, obtained a CT head without contrast that showed no acute process.   I will obtain MRI stat, I consulted ophthalmology and had discussion with the ophthalmologist, unable to to determine if the patient had central artery occlusion. At this point ophthalmology reported that if the patient needed surgery then regardless of the risks the patient should okay to go through surgery, the work-up would need to be cardiovascular, I will consult cardiology for preop evaluation      NOTE: This report was transcribed using voice recognition software. Every effort was made to ensure accuracy; however, inadvertent computerized transcription errors may be present.   Electronically signed by Joe Youssef MD on 2/24/2021 at 12:40 PM

## 2021-02-24 NOTE — CARE COORDINATION
Social Work/Discharge Planning:  Met with patient and discussed AMPAC score indicating need for snf. Patient is agreeable to snf at discharge. Informed patient this worker will [facility] he can go to at [facility]. Patient states he sees Dr. Marylee Bence at the [address] [place] [address]. Patient provided this worker with number to [place]. Called Shayna at [place] (ph: [phone]) and she states patient is seen at the [facility] (ph: [phone]). This worker was transferred to [place] at [facility] and he states he will have the  Yudelka An return this workers call. [facility] PCP at the [place] is Dr. Joselo Davis. Will continue to follow.   Electronically signed by ALBAN Sotomayor on 2/24/2021 at 12:46 PM

## 2021-02-24 NOTE — CONSULTS
Teena Shoemaker  YOB: 1940  [id number]        Re:   [address]      The patient is a [de-identified] y.o. male who was admitted with bowel obstruction. Patient has a long history of eye problems. 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 He is currently being treated for glaucoma in the left. He was considered legally blind prior to this new loss of vision left. Patient states that he lost the vision in 1 to 2 days ago. It has not improved. He is able to see movement. Patient is oriented to person, place, time, and general circumstances.     Past ocular history: Glaucoma prob endstage- seen in [place]  Glaucoma then stroke in the right -      Past Medical History:   Diagnosis Date    Blind right eye     CAD (coronary artery disease)     1994 CABG x 4    CHF (congestive heart failure) ([facility])     Chronic respiratory failure ([address] 9/22/2014    CKD (chronic kidney disease) stage 3, GFR 30-59 ml/min ([facility])     Diabetes mellitus ([address]     Diabetes mellitus type 2, controlled ([address] 5/23/2013    HLD (hyperlipidemia)     Hypertension     TIA (transient ischemic attack)      Past Surgical History:   Procedure Laterality Date    CARDIAC SURGERY      CORONARY ARTERY BYPASS GRAFT  1994    x4    ECHO COMPL W DOP COLOR FLOW  5/23/2013         ECHO COMPL W DOP COLOR FLOW  5/23/2013         EYE SURGERY       Allergies   Allergen Reactions    Aldactone [Spironolactone] Other (See Comments)     Hyperkalemia--4/16/15       Medications- reviewed in chart    Review of Systems- reviewed with patient and reviewed in chart    Ophthalmic exam:    Pupils: Equally round and reactive to light   Extraocular motility: extra-ocular motions intact  Gross visual fields: normal     Visual Acuity with near card:  Right:  20/ NLP        Left:    20/  HM  Right eye      Anterior Segment:    lids/lashes/lacrimal system:  normal  Conjunctiva/sclera:   normal  Cornea: Corneal scar  anterior chamber:   normal  Iris:     normal  Posterior Segment:  No view due to corneal scar    Left eye: Dilated with Mydriacyl and cyclogel 1%-(waited over an hour with multiple drops of each- very poor dilation/poor corneal clarity-   Anterior Segment:    lids/lashes/lacrimal system:  normal  Conjunctiva/sclera:   normal  Cornea:    few areas of corneal scar  anterior chamber:   normal  Iris:     Normal- poor dilating iris   Posterior Segment: hazy view due to small pupil and corneal clarity  Lens:     IOL  anterior vitreous:   normal  Hazy poor view of posterior pole- did not seem to be edematous   Assessment:    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 Does not have any other symptoms of stroke. Most likely cause of vision loss due to vascular disease- whether from CRAO on optic neuropathy-  Work up for stroke is primarily done as there is a higher risk of heart disease or carotid issues. He already has a heart issue and known vascular disease. Surgery will not cause any worsening of eye disease. Plan:  No treatment available most likely for loss of vision-  To see his glaucoma specialist in [place] when stable after discharge. Explained what symptoms patient needs to notify me for. Please don't  hesitate to call if you need further help with this patient.   Thank you Jose Miguel Earl MD

## 2021-02-24 NOTE — PROGRESS NOTES
This patient's calculated CrCl is 35 mL/min. Please consider increasing the enoxaparin dose from 30 mg daily to 40 mg daily.   Melba Alexander RPh 2/24/2021 6:52 AM

## 2021-02-24 NOTE — PROGRESS NOTES
[facility] Quality Flow/Interdisciplinary Rounds Progress Note        Quality Flow Rounds held on February 24, 2021    Disciplines Attending:  Bedside Nurse, ,  and Nursing Unit Leadership    Luna Savage was admitted on 2/20/2021  5:15 PM    Anticipated Discharge Date:  Expected Discharge Date: 03/02/21    Disposition:    Justo Score:  Justo Scale Score: 19    Readmission Risk              Risk of Unplanned Readmission:        25           Discussed patient goal for the day, patient clinical progression, and barriers to discharge. The following Goal(s) of the Day/Commitment(s) have been identified:  CT head, new consult for opthalmology, Bowel Resction cancelled.        Chin De Los Santos  February 24, 2021

## 2021-02-24 NOTE — CARE COORDINATION
Asked Patient Access dept to verify benefits to check for potential Medicare eligibility. They found the patient DOES have Medicare PART A ONLY. CM/SW aware.

## 2021-02-24 NOTE — PROGRESS NOTES
Dr. Theresa Don notified that pts is now unable to see in the left eye and BP of 196/79. Surgery is on hold for now. OR made aware.

## 2021-02-24 NOTE — PROGRESS NOTES
GENERAL SURGERY  DAILY PROGRESS NOTE  2/24/2021    Subjective:  Denies flatus or bowel movements, no nausea or vomiting, frustrated that surgery is delayed, states he can see light and dark and that I but it is much worse than his baseline and has been that way for the last 2 days.   He has equal strength in bilateral upper extremities    Objective:  BP (!) 196/79   Pulse 84   Temp 99.2 °F (37.3 °C) (Oral)   Resp 18   Ht 5' 11\" (1.803 m)   Wt 190 lb (86.2 kg)   SpO2 93%   BMI 26.50 kg/m²     GENERAL:  Laying in bed, no apparent distress  LUNGS:  No increased work of breathing, no cyanosis  CARDIOVASCULAR:  Extremities warm and well perfused,   ABDOMEN:  Soft, no tenderness, none distended, NG tube in place with bilious output 800 cc  SKIN: Warm and dry    Assessment/Plan:  [de-identified] y.o. male with small bowel obstruction, likely chronic    Diagnostic laparoscopy delayed secondary to stroke work-up per medicine  repeat vitals pending  Nausea control  IV fluids    Electronically signed by Kwabena Gong MD on 2/24/2021 at 9:05 AM

## 2021-02-24 NOTE — CARE COORDINATION
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 NEW loss of vision in L eye- surgery canceled today- CT head negative- Ophthamology to see. NG to LIS. Pt only has [employer] benefits- attempting to verify if pt has Waldo Nath as well- will follow up. Currently on O2 3 lNC-wears home O2 2l [place] provided through [place]. Discharge plan unknown at present pending progress- for home w/ brother PTA-per Tonya Lang is checking if pt qualifies for snf- is only 60% service connected.  Will follow Kevin Antony

## 2021-02-24 NOTE — PROGRESS NOTES
Spoke with Dr. Ospina [facility] re: consult for left eye vision loss. States she will be in to see the pt this afternoon.

## 2021-02-24 NOTE — PROGRESS NOTES
Walked into pts room to him telling me that he cant see at all in his left eye. States he is blind in his right eye and legally blind in his left eye. He is stating now that he cant see at all in his left eye and this has been progressively getting worse over the last 1 1/2 days. He is unable to see my fingers within an inch from his eye. Dr. Charline Irvin made aware of this situation and that his BP is 196/79. Orders received. OR called to inform of situation. States to call Dr. Heather Granda.

## 2021-02-24 NOTE — PROGRESS NOTES
Department of Internal Medicine  Nephrology Attending Progress Note        SUBJECTIVE: Mr. Gayle Troy began complaining of change in his vision this morning, he is blind in his right eye and legally blind in his left eye. States he can only see broad areas of color. CT did not show an acute infarct. Blood pressures are slightly higher today. No bowel movements. . Surgery postponed due to visual changes. . Sodium elevated today but labs drawn below IV site suspicious that reading may be inaccurate      Physical Exam:    Vitals:    02/24/21 1249   BP: (!) 172/81   Pulse: 91   Resp:    Temp:    SpO2:        I/O last 24 hours:  Intake/Output intake not recorded/2250:    Weight: Not done    General Appearance:  awake, alert, oriented, in mild distress, NG draining some blood-tinged fluid, poor oral hygiene  Skin: No rash  Neck:  neck- supple, no mass, non-tender and no bruits  Lungs: Decreased breath sounds at bases  Heart: Regular rhythm     Abdominal: Decreased bowel sounds  Extremities: No edema  Peripheral Pulses:  +2    DATA:    CBC with Differential:    Lab Results   Component Value Date    WBC 8.4 02/22/2021    RBC 4.32 02/22/2021    HGB 13.8 02/22/2021    HCT 40.4 02/22/2021     02/22/2021    MCV 93.5 02/22/2021    MCH 31.9 02/22/2021    MCHC 34.2 02/22/2021    RDW 13.2 02/22/2021    NRBC 0.0 02/21/2021    SEGSPCT 56 02/03/2014    LYMPHOPCT 11.3 02/21/2021    MONOPCT 17.4 02/21/2021    BASOPCT 0.0 02/21/2021    MONOSABS 1.44 02/21/2021    LYMPHSABS 1.87 02/21/2021    EOSABS 0.00 02/21/2021    BASOSABS 0.00 02/21/2021     BMP:    Lab Results   Component Value Date     02/24/2021    K 3.7 02/24/2021    K 4.2 02/21/2021     02/24/2021    CO2 28 02/24/2021    BUN 58 02/24/2021    LABALBU 4.2 02/20/2021    CREATININE 1.8 02/24/2021    CALCIUM 8.2 02/24/2021    GFRAA 44 02/24/2021    LABGLOM 44 02/24/2021    GLUCOSE 193 02/24/2021   PRUDENCE negative  Ionized calcium 1.01         ceFAZolin  2,000 mg Intravenous See Admin Instructions    carvedilol  25 mg Oral BID     hydrALAZINE  25 mg Oral TID    isosorbide mononitrate  30 mg Oral Daily    atorvastatin  10 mg Oral Daily    brimonidine  1 drop Left Eye BID    latanoprost  1 drop Left Eye Nightly    timolol  1 drop Left Eye BID    sodium chloride flush  10 mL Intravenous 2 times per day    enoxaparin  30 mg Subcutaneous Daily    insulin lispro  0-12 Units Subcutaneous Q4H    influenza virus vaccine  0.5 mL Intramuscular Prior to discharge      dextrose 5 % and 0.45 % NaCl 100 mL/hr at 02/24/21 1252    dextrose       labetalol, morphine, phenol, trimethobenzamide, ipratropium-albuterol, nitroGLYCERIN, sodium chloride flush, polyethylene glycol, acetaminophen **OR** acetaminophen, glucose, dextrose, glucagon (rDNA), dextrose, hydrALAZINE    IMPRESSION/RECOMMENDATIONS:         Acute kidney injury superimposed on CKD 3-A, nonoliguric, creatinine improving  Hypokalemia improving  Hypocalcemia we will give 1 amp of calcium IV push  Hypertension worse begin some IV labetalol  Small bowel obstruction as per surgery  Type 2 diabetes minimal proteinuria noted  Worsening vision awaiting ophthalmology  Hyponatremia will change IV suspect some component related to blood being drawn below IV site      Elvia Padilla MD  2/24/2021 1:14 PM

## 2021-02-25 ENCOUNTER — APPOINTMENT (OUTPATIENT)
Dept: NON INVASIVE DIAGNOSTICS | Age: 81
DRG: 335 | End: 2021-02-25
Payer: OTHER GOVERNMENT

## 2021-02-25 ENCOUNTER — APPOINTMENT (OUTPATIENT)
Dept: NUCLEAR MEDICINE | Age: 81
DRG: 335 | End: 2021-02-25
Payer: OTHER GOVERNMENT

## 2021-02-25 ENCOUNTER — APPOINTMENT (OUTPATIENT)
Dept: ULTRASOUND IMAGING | Age: 81
DRG: 335 | End: 2021-02-25
Payer: OTHER GOVERNMENT

## 2021-02-25 LAB
ABO/RH: NORMAL
ANION GAP SERPL CALCULATED.3IONS-SCNC: 6 MMOL/L (ref 7–16)
ANTIBODY SCREEN: NORMAL
BUN BLDV-MCNC: 29 MG/DL (ref 8–23)
CALCIUM SERPL-MCNC: 8.5 MG/DL (ref 8.6–10.2)
CHLORIDE BLD-SCNC: 118 MMOL/L (ref 98–107)
CO2: 27 MMOL/L (ref 22–29)
CREAT SERPL-MCNC: 1.3 MG/DL (ref 0.7–1.2)
GFR AFRICAN AMERICAN: >60
GFR NON-AFRICAN AMERICAN: >60 ML/MIN/1.73
GLUCOSE BLD-MCNC: 255 MG/DL (ref 74–99)
HCT VFR BLD CALC: 41.4 % (ref 37–54)
HEMOGLOBIN: 13.6 G/DL (ref 12.5–16.5)
LV EF: 64 %
LVEF MODALITY: NORMAL
MCH RBC QN AUTO: 31.9 PG (ref 26–35)
MCHC RBC AUTO-ENTMCNC: 32.9 % (ref 32–34.5)
MCV RBC AUTO: 97 FL (ref 80–99.9)
METER GLUCOSE: 168 MG/DL (ref 74–99)
METER GLUCOSE: 176 MG/DL (ref 74–99)
METER GLUCOSE: 196 MG/DL (ref 74–99)
METER GLUCOSE: 201 MG/DL (ref 74–99)
METER GLUCOSE: 202 MG/DL (ref 74–99)
METER GLUCOSE: 239 MG/DL (ref 74–99)
PDW BLD-RTO: 14.1 FL (ref 11.5–15)
PLATELET # BLD: 149 E9/L (ref 130–450)
PMV BLD AUTO: 10.4 FL (ref 7–12)
POTASSIUM SERPL-SCNC: 3.7 MMOL/L (ref 3.5–5)
RBC # BLD: 4.27 E12/L (ref 3.8–5.8)
SODIUM BLD-SCNC: 151 MMOL/L (ref 132–146)
WBC # BLD: 9.2 E9/L (ref 4.5–11.5)

## 2021-02-25 PROCEDURE — 6360000002 HC RX W HCPCS: Performed by: STUDENT IN AN ORGANIZED HEALTH CARE EDUCATION/TRAINING PROGRAM

## 2021-02-25 PROCEDURE — 78452 HT MUSCLE IMAGE SPECT MULT: CPT

## 2021-02-25 PROCEDURE — 80048 BASIC METABOLIC PNL TOTAL CA: CPT

## 2021-02-25 PROCEDURE — 99223 1ST HOSP IP/OBS HIGH 75: CPT | Performed by: PSYCHIATRY & NEUROLOGY

## 2021-02-25 PROCEDURE — 6360000002 HC RX W HCPCS: Performed by: INTERNAL MEDICINE

## 2021-02-25 PROCEDURE — 2580000003 HC RX 258: Performed by: INTERNAL MEDICINE

## 2021-02-25 PROCEDURE — 2500000003 HC RX 250 WO HCPCS: Performed by: FAMILY MEDICINE

## 2021-02-25 PROCEDURE — 93018 CV STRESS TEST I&R ONLY: CPT | Performed by: INTERNAL MEDICINE

## 2021-02-25 PROCEDURE — 82962 GLUCOSE BLOOD TEST: CPT

## 2021-02-25 PROCEDURE — 86900 BLOOD TYPING SEROLOGIC ABO: CPT

## 2021-02-25 PROCEDURE — A9500 TC99M SESTAMIBI: HCPCS | Performed by: RADIOLOGY

## 2021-02-25 PROCEDURE — 36415 COLL VENOUS BLD VENIPUNCTURE: CPT

## 2021-02-25 PROCEDURE — 85027 COMPLETE CBC AUTOMATED: CPT

## 2021-02-25 PROCEDURE — 2500000003 HC RX 250 WO HCPCS: Performed by: STUDENT IN AN ORGANIZED HEALTH CARE EDUCATION/TRAINING PROGRAM

## 2021-02-25 PROCEDURE — 86850 RBC ANTIBODY SCREEN: CPT

## 2021-02-25 PROCEDURE — 93880 EXTRACRANIAL BILAT STUDY: CPT

## 2021-02-25 PROCEDURE — 93016 CV STRESS TEST SUPVJ ONLY: CPT | Performed by: INTERNAL MEDICINE

## 2021-02-25 PROCEDURE — APPSS60 APP SPLIT SHARED TIME 46-60 MINUTES: Performed by: PHYSICIAN ASSISTANT

## 2021-02-25 PROCEDURE — 99222 1ST HOSP IP/OBS MODERATE 55: CPT | Performed by: STUDENT IN AN ORGANIZED HEALTH CARE EDUCATION/TRAINING PROGRAM

## 2021-02-25 PROCEDURE — 93017 CV STRESS TEST TRACING ONLY: CPT

## 2021-02-25 PROCEDURE — 6370000000 HC RX 637 (ALT 250 FOR IP): Performed by: INTERNAL MEDICINE

## 2021-02-25 PROCEDURE — 3430000000 HC RX DIAGNOSTIC RADIOPHARMACEUTICAL: Performed by: RADIOLOGY

## 2021-02-25 PROCEDURE — 2060000000 HC ICU INTERMEDIATE R&B

## 2021-02-25 PROCEDURE — 2700000000 HC OXYGEN THERAPY PER DAY

## 2021-02-25 PROCEDURE — APPSS30 APP SPLIT SHARED TIME 16-30 MINUTES: Performed by: NURSE PRACTITIONER

## 2021-02-25 PROCEDURE — 86901 BLOOD TYPING SEROLOGIC RH(D): CPT

## 2021-02-25 PROCEDURE — 99232 SBSQ HOSP IP/OBS MODERATE 35: CPT | Performed by: INTERNAL MEDICINE

## 2021-02-25 PROCEDURE — 78452 HT MUSCLE IMAGE SPECT MULT: CPT | Performed by: INTERNAL MEDICINE

## 2021-02-25 RX ORDER — ASPIRIN 81 MG/1
81 TABLET, CHEWABLE ORAL DAILY
Status: DISCONTINUED | OUTPATIENT
Start: 2021-02-25 | End: 2021-03-04 | Stop reason: HOSPADM

## 2021-02-25 RX ORDER — HYDRALAZINE HYDROCHLORIDE 20 MG/ML
20 INJECTION INTRAMUSCULAR; INTRAVENOUS EVERY 6 HOURS PRN
Status: DISCONTINUED | OUTPATIENT
Start: 2021-02-25 | End: 2021-03-04 | Stop reason: HOSPADM

## 2021-02-25 RX ORDER — PANTOPRAZOLE SODIUM 40 MG/10ML
40 INJECTION, POWDER, LYOPHILIZED, FOR SOLUTION INTRAVENOUS DAILY
Status: DISCONTINUED | OUTPATIENT
Start: 2021-02-26 | End: 2021-02-25 | Stop reason: RX

## 2021-02-25 RX ORDER — LANSOPRAZOLE
30 KIT DAILY
Status: DISCONTINUED | OUTPATIENT
Start: 2021-02-26 | End: 2021-03-04

## 2021-02-25 RX ORDER — SODIUM CHLORIDE 9 MG/ML
10 INJECTION INTRAVENOUS DAILY
Status: DISCONTINUED | OUTPATIENT
Start: 2021-02-26 | End: 2021-02-25 | Stop reason: RX

## 2021-02-25 RX ADMIN — Medication 30 MILLICURIE: at 14:52

## 2021-02-25 RX ADMIN — LABETALOL HYDROCHLORIDE 30 MG: 5 INJECTION INTRAVENOUS at 18:16

## 2021-02-25 RX ADMIN — INSULIN LISPRO 2 UNITS: 100 INJECTION, SOLUTION INTRAVENOUS; SUBCUTANEOUS at 18:16

## 2021-02-25 RX ADMIN — INSULIN LISPRO 4 UNITS: 100 INJECTION, SOLUTION INTRAVENOUS; SUBCUTANEOUS at 22:09

## 2021-02-25 RX ADMIN — MORPHINE SULFATE 1 MG: 2 INJECTION, SOLUTION INTRAMUSCULAR; INTRAVENOUS at 18:27

## 2021-02-25 RX ADMIN — INSULIN LISPRO 1 UNITS: 100 INJECTION, SOLUTION INTRAVENOUS; SUBCUTANEOUS at 10:19

## 2021-02-25 RX ADMIN — HYDRALAZINE HYDROCHLORIDE 20 MG: 20 INJECTION INTRAMUSCULAR; INTRAVENOUS at 20:19

## 2021-02-25 RX ADMIN — MORPHINE SULFATE 1 MG: 2 INJECTION, SOLUTION INTRAMUSCULAR; INTRAVENOUS at 06:18

## 2021-02-25 RX ADMIN — LATANOPROST 1 DROP: 50 SOLUTION OPHTHALMIC at 20:18

## 2021-02-25 RX ADMIN — LABETALOL HYDROCHLORIDE 30 MG: 5 INJECTION INTRAVENOUS at 12:29

## 2021-02-25 RX ADMIN — POTASSIUM CHLORIDE: 2 INJECTION, SOLUTION, CONCENTRATE INTRAVENOUS at 18:11

## 2021-02-25 RX ADMIN — Medication 10 MILLICURIE: at 12:30

## 2021-02-25 RX ADMIN — REGADENOSON 0.4 MG: 0.08 INJECTION, SOLUTION INTRAVENOUS at 14:06

## 2021-02-25 RX ADMIN — INSULIN LISPRO 4 UNITS: 100 INJECTION, SOLUTION INTRAVENOUS; SUBCUTANEOUS at 02:58

## 2021-02-25 RX ADMIN — LABETALOL HYDROCHLORIDE 30 MG: 5 INJECTION INTRAVENOUS at 06:17

## 2021-02-25 RX ADMIN — MORPHINE SULFATE 1 MG: 2 INJECTION, SOLUTION INTRAMUSCULAR; INTRAVENOUS at 02:16

## 2021-02-25 RX ADMIN — DEXTROSE AND SODIUM CHLORIDE: 5; 450 INJECTION, SOLUTION INTRAVENOUS at 12:32

## 2021-02-25 RX ADMIN — MORPHINE SULFATE 1 MG: 2 INJECTION, SOLUTION INTRAMUSCULAR; INTRAVENOUS at 10:19

## 2021-02-25 RX ADMIN — TIMOLOL MALEATE 1 DROP: 5 SOLUTION/ DROPS OPHTHALMIC at 09:29

## 2021-02-25 RX ADMIN — TIMOLOL MALEATE 1 DROP: 5 SOLUTION/ DROPS OPHTHALMIC at 20:18

## 2021-02-25 RX ADMIN — BRIMONIDINE TARTRATE 1 DROP: 2 SOLUTION OPHTHALMIC at 15:57

## 2021-02-25 RX ADMIN — BRIMONIDINE TARTRATE 1 DROP: 2 SOLUTION OPHTHALMIC at 09:29

## 2021-02-25 RX ADMIN — INSULIN LISPRO 2 UNITS: 100 INJECTION, SOLUTION INTRAVENOUS; SUBCUTANEOUS at 06:16

## 2021-02-25 RX ADMIN — SODIUM CHLORIDE, PRESERVATIVE FREE 10 ML: 5 INJECTION INTRAVENOUS at 20:18

## 2021-02-25 ASSESSMENT — PAIN SCALES - GENERAL
PAINLEVEL_OUTOF10: 9
PAINLEVEL_OUTOF10: 9

## 2021-02-25 ASSESSMENT — PAIN DESCRIPTION - LOCATION: LOCATION: ABDOMEN

## 2021-02-25 NOTE — PROGRESS NOTES
Notified Dr. Ana Coburn of MRI result. See order for aspirin suppository. Estelita Clayton RN updated as well.

## 2021-02-25 NOTE — PROGRESS NOTES
Called by nurse re: results of MRI. There is question as read by radiologist of a 4 mm punctate acute infarct in the left frontal lobe of deep white matter. An infarct in this area of the brain would not cause the left vision loss. Vision loss could still likely be a CRAO, which would still benefit from aspirin dosing. Hg and platelets normal.  Nurse reports no active bleeding. Will give a dose of  mg suppository X 1 (since patient is NPO). Pt already on Lipitor. Will increase labetalol to 30 mg every 6 hrs (to see if that better controls BP) and keep ramping up dose from here.

## 2021-02-25 NOTE — PROGRESS NOTES
Department of Internal Medicine  Nephrology Attending Progress Note        SUBJECTIVE: Mr Mable Ley in better spirits and able to get some ice chips.,  No change in vision in his left eye.   Blood pressure control remains suboptimal.  Patient's IV fluids changed to TPN, sodium remains problematic,     Physical Exam:    Vitals:    02/25/21 1500   BP: (!) 190/86   Pulse: 86   Resp: 18   Temp:    SpO2: 98%       I/O last 24 hours:  Intake/Output inaccurate:    Weight: Not done    General Appearance:  awake, alert, oriented, in mild distress, NG draining some blood-tinged fluid, poor oral hygiene  Skin: No rash  Neck:  neck- supple, no mass, non-tender and no bruits  Lungs: Decreased breath sounds at bases  Heart: Regular rhythm     Abdominal: Decreased bowel sounds  Extremities: No edema  Peripheral Pulses:  +2    DATA:    CBC with Differential:    Lab Results   Component Value Date    WBC 9.2 02/25/2021    RBC 4.27 02/25/2021    HGB 13.6 02/25/2021    HCT 41.4 02/25/2021     02/25/2021    MCV 97.0 02/25/2021    MCH 31.9 02/25/2021    MCHC 32.9 02/25/2021    RDW 14.1 02/25/2021    NRBC 0.0 02/21/2021    SEGSPCT 56 02/03/2014    LYMPHOPCT 11.3 02/21/2021    MONOPCT 17.4 02/21/2021    BASOPCT 0.0 02/21/2021    MONOSABS 1.44 02/21/2021    LYMPHSABS 1.87 02/21/2021    EOSABS 0.00 02/21/2021    BASOSABS 0.00 02/21/2021     BMP:    Lab Results   Component Value Date     02/25/2021    K 3.7 02/25/2021    K 4.2 02/21/2021     02/25/2021    CO2 27 02/25/2021    BUN 29 02/25/2021    LABALBU 4.2 02/20/2021    CREATININE 1.3 02/25/2021    CALCIUM 8.5 02/25/2021    GFRAA >60 02/25/2021    LABGLOM >60 02/25/2021    GLUCOSE 255 02/25/2021       R     aspirin  81 mg Oral Daily    labetalol  30 mg Intravenous Q6H    ceFAZolin  2,000 mg Intravenous See Admin Instructions    atorvastatin  10 mg Oral Daily    brimonidine  1 drop Left Eye BID    latanoprost  1 drop Left Eye Nightly    timolol  1 drop Left Eye BID    sodium chloride flush  10 mL Intravenous 2 times per day    enoxaparin  30 mg Subcutaneous Daily    insulin lispro  0-12 Units Subcutaneous Q4H    influenza virus vaccine  0.5 mL Intramuscular Prior to discharge      PN-Adult 3 IN 1 Peripheral Line      dextrose       hydrALAZINE, morphine, phenol, trimethobenzamide, ipratropium-albuterol, nitroGLYCERIN, sodium chloride flush, polyethylene glycol, acetaminophen **OR** acetaminophen, glucose, dextrose, glucagon (rDNA), dextrose    IMPRESSION/RECOMMENDATIONS:      Acute kidney injury superimposed on CKD 3-A, nonoliguric, creatinine improving  Hypokalemia improving  Hypocalcemia  improving  Hypertension not well controlled labetalol increased, will increase as needed hydralazine to 20  Small bowel otruction as per surgery  Type 2 diabetes minimal proteinuria noted  Worsening vision awaiting ophthalmology  Hypernatremia amount of sodium in TPN may need reduced, as it is higher than in half-normal.    Kika Smith MD  2/25/2021 6:08 PM

## 2021-02-25 NOTE — PROGRESS NOTES
Patient upset that he can't have a \"pitcher\" of ice. He was given multiple cups of ice throughout the night even though he is NPO because he states that the doctor told him he could have ice and he is screaming out about it. There is no order for it and he is scheduled for surgery this morning. He was notified of this. He keeps yelling out at this nurse every time I've went in the room to address his issues. I gave him ice again at [facility] and informed him that it would be his last cup of ice since he has surgery. He is not satisfied with this.

## 2021-02-25 NOTE — PROGRESS NOTES
2021  1:06 PM           Due to [address]  PN policy, all new orders for parenteral nutrition will be reviewed by clinical dietitian and clinical pharmacist to determine if the order meets the indication guidelines as follows:    Check box if present Approved indication/criteria for total parenteral nutrition   []  patient   [] Malnourished adult patient without nutrition for 3 - 5 days without ability to take enteral nutrition (examples below table)   [x] Previously well-nourished adult without nutrition for 7 days without ability to take enteral nutrition (examples below in table)     Example Disease states Requiring Parenteral Nutrition    Disease Category Examples   Impaired absorption Short bowel syndrome, NEC, meconium ileus, trauma   Mechanical bowel obstruction Intrinsic or extrinsic blockage of intestinal lumen (stenosis, strictures, inflammatory disease)   Need to restrict PO or enteral intake Ischemic bowel, severe pancreatitis, chylous fistula, preoperative status   Motility disorders Prolonged ileus, pseudo-obstruction, visceral organ myopathy   Inability to achieve or maintain enteral access Varies with clinical circumstances       Stephany Yanez 2021. 1:06 PM     Contact: [phone]

## 2021-02-25 NOTE — PROGRESS NOTES
Secure message sent to [facility] Cardiology re: stress test and surgery. Per Dr. Oziel Ohara: Cardiology recommends  ischemic evaluation. If surgical team thinks surgery is urgent and cant wait then they should go ahead.

## 2021-02-25 NOTE — PROGRESS NOTES
Pharm stress test completed with RN, nuclear tech, and physician wearing masks.   Patient removed his mask for breathing purposes during test.

## 2021-02-25 NOTE — PROGRESS NOTES
Call placed to Dr. Cesario Lawson to notify Dr. Eloy Ortega has given cardiac clearance for surgery. Awaiting call back.

## 2021-02-25 NOTE — PROGRESS NOTES
[address] [place], [place] [address]ist   Progress Note    Admitting Date and Time: 2/20/2021  5:15 PM  Admit Dx: Intestinal obstruction ([address] [K56.609]    Subjective:    Pt feels frustrated. Patient upset 2/2 cancellation of surgical procedure today. Patient went for stress test.  Explained to patient the need for cardiac clearance prior to surgical procedure. Remains frustrated. Stress test results pending. Per RN: Patient noted with frustration 2/2 cancellation of surgical procedure. ROS: denies fever, chills, cp, sob, n/v, HA unless stated above.      labetalol  30 mg Intravenous Q6H    ceFAZolin  2,000 mg Intravenous See Admin Instructions    atorvastatin  10 mg Oral Daily    brimonidine  1 drop Left Eye BID    latanoprost  1 drop Left Eye Nightly    timolol  1 drop Left Eye BID    sodium chloride flush  10 mL Intravenous 2 times per day    enoxaparin  30 mg Subcutaneous Daily    insulin lispro  0-12 Units Subcutaneous Q4H    influenza virus vaccine  0.5 mL Intramuscular Prior to discharge         morphine, 1 mg, Q4H PRN      phenol, 1 spray, Q2H PRN      trimethobenzamide, 200 mg, Q6H PRN      ipratropium-albuterol, 3 mL, Q6H PRN      nitroGLYCERIN, 0.4 mg, Q5 Min PRN      sodium chloride flush, 10 mL, PRN      polyethylene glycol, 17 g, Daily PRN      acetaminophen, 650 mg, Q6H PRN    Or      acetaminophen, 650 mg, Q6H PRN      glucose, 15 g, PRN      dextrose, 12.5 g, PRN      glucagon (rDNA), 1 mg, PRN      dextrose, 100 mL/hr, PRN      hydrALAZINE, 10 mg, Q6H PRN         Objective:    BP (!) 210/88   Pulse 86   Temp 98.7 °F (37.1 °C) (Oral)   Resp 18   Ht 5' 11\" (1.803 m)   Wt 190 lb (86.2 kg)   SpO2 98%   BMI 26.50 kg/m²   General Appearance: alert and oriented to person, place and time and in no acute distress  Skin: warm and dry  Head: normocephalic and atraumatic  Eyes: pupils equal, round, and reactive to light, extraocular eye movements intact, conjunctivae normal  Neck: neck supple and non tender without mass   Pulmonary/Chest: clear to auscultation bilaterally- no wheezes, rales or rhonchi, normal air movement, no respiratory distress  Cardiovascular: normal rate, normal S1 and S2 and no carotid bruits  Abdomen: soft, non-tender, non-distended, normal bowel sounds, no masses or organomegaly  Extremities: no cyanosis, no clubbing and no edema  Neurologic: no cranial nerve deficit and speech normal      Recent Labs     02/24/21  0230 02/24/21 2000 02/25/21  0230   * 154* 151*   K 3.7 4.2 3.7   * 119* 118*   CO2 28 28 27   BUN 58* 35* 29*   CREATININE 1.8* 1.3* 1.3*   GLUCOSE 193* 273* 255*   CALCIUM 8.2* 8.5* 8.5*       No results for input(s): ALKPHOS, PROT, LABALBU, BILITOT, AST, ALT in the last 72 hours. Recent Labs     02/25/21  1012   WBC 9.2   RBC 4.27   HGB 13.6   HCT 41.4   MCV 97.0   MCH 31.9   MCHC 32.9   RDW 14.1      MPV 10.4            Radiology:   MRI BRAIN W WO CONTRAST   Final Result   1. Questionable punctate 4 mm diameter focus of acute ischemia involving the   mid left frontal lobe deep white matter. 2. Moderate cerebral white matter chronic microvascular ischemic disease. 3. Mild diffuse cerebral atrophy. CT HEAD WO CONTRAST   Final Result   1. There is no acute intracranial abnormality. Specifically, there is no   intracranial hemorrhage. 2. Atrophy and periventricular leukomalacia,   3. Given the symptoms of visual loss an asses of any infarct within the   occipital lobes or suprasellar mass dedicated MRI of the brain is recommended   for further evaluation. XR ABDOMEN FOR NG/OG/NE TUBE PLACEMENT   Final Result   Satisfactory position of nasogastric tube following advancement. XR ABDOMEN (KUB) (SINGLE AP VIEW)   Final Result   The tip of the NG tube is at the level of the diaphragm.   Please advance the   NG tube by an additional 10 cm   Multiple dilated loops of small bowel consistent with small bowel obstruction. CT ABDOMEN PELVIS WO CONTRAST Additional Contrast? Oral   Final Result   Persistent high-grade small-bowel obstruction with significantly dilated   proximal small bowel loops with the transition point in the right lower   quadrant with the swirling appearance in the mesentery. Mesenteric or   midgrade volvulus or adhesions/stricture is considered. Decompression is   recommended. XR ABDOMEN FOR NG/OG/NE TUBE PLACEMENT   Final Result   Satisfactory position of enteric tube. CT ABDOMEN PELVIS WO CONTRAST Additional Contrast? None   Final Result   Dilatation of multiple segments of small bowel throughout the abdomen and   pelvis with air-fluid levels consistent with small bowel obstruction. 2 areas of transition are seen in the right mid abdomen. A transition is seen at a site of rotatory configuration of small bowel and   mesenteric vessels with narrowing of small bowel suggestive of small bowel   volvulus. Follow-up with oral contrast may be helpful in further evaluation. Enlarged prostate. Prominent atherosclerotic peripheral vascular disease. XR CHEST PORTABLE   Final Result   Mild cardiomegaly status post median sternotomy. No acute infiltrates or   failure. Stress/Rest NM Myocardial SPECT Exercise or RX    (Results Pending)       Assessment:  Active Problems:    Intestinal obstruction (HCC)  Resolved Problems:    * No resolved hospital problems. *      Plan:  1. GNG-0/24/4704 CT abdomen pelvis with significantly dilated fluid-filled proximal small bowel loops measuring up to 4.2 cm. Distal small bowel loops and colon collapsed. Narrowing of small bowel loops in the right lower quadrant with a twisted appearance. NPO. NGT for decompression. IVF hydration. Scheduled for exploratory lap today/delayed 2/2 stroke/cardiovascular work-up. General surgery input appreciated. 2. Hypernatremia- Na+ 151. Continue to follow closely.   Nephrology input infarction  itz  Dm type 2 controlled  htn  Left eye vision loss  hypokalemia      Electronically signed by Octavio Tony D.O.   Hospitalist  [facility] at [facility]

## 2021-02-25 NOTE — CONSULTS
NEUROLOGY CONSULT NOTE       Requesting Physician: Rosendo Overton DO     Reason for Consult:  Evaluate for \"questionable punctate left frontal area of ischemia on MRI; left-sided sudden visual loss\"    History of Present Illness:  Dianne Arriaga is a [de-identified] y.o. male admitted to [facility] on 2/20/2021. He presented for abdominal pain and vomiting and was admitted with a small bowel obstruction. 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 Patient told the nurse yesterday morning that he had lost vision in the left eye. Told her this had become progressively worse over a day and a half. Blood pressure was very high at the time. He tells me, as well, that this visual loss was gradual in onset, starting with just blurry vision and then progressing. No better today than yesterday at which point he could see movement and that was it. No associated headache or pain around the eye area. No associated neurological symptoms such as focal weakness, numbness, or paresthesias. He is right-handed.     Past Medical History:        Diagnosis Date    Blind right eye     CAD (coronary artery disease)     1994 CABG x 4    CHF (congestive heart failure) ([facility])     Chronic respiratory failure ([address] 9/22/2014    CKD (chronic kidney disease) stage 3, GFR 30-59 ml/min ([facility])     Diabetes mellitus ([address]     Diabetes mellitus type 2, controlled ([address] 5/23/2013    HLD (hyperlipidemia)     Hypertension     TIA (transient ischemic attack)            Procedure Laterality Date    CARDIAC SURGERY      CORONARY ARTERY BYPASS GRAFT  1994    x4    ECHO COMPL W DOP COLOR FLOW  5/23/2013         ECHO COMPL W DOP COLOR FLOW  5/23/2013         EYE SURGERY         Social History:  Social History     Tobacco Use   Smoking Status Former Smoker    Years: 2.00   Smokeless Tobacco Former User    Quit date: 8/12/1984     Social History     Substance and Sexual Activity   Alcohol Use No Social History     Substance and Sexual Activity   Drug Use No     Single    Family History: Mother had a \"nervous condition\"; no history of stroke or heart disease in his parents; no history of tremor    Review of Systems:  CONSTITUTIONAL:  negative for fever   EYE: See HPI  RESPIRATORY: Occasional dyspnea  CARDIOVASCULAR:  negative for chest pain  GASTROINTESTINAL: See HPI  HEMATOLOGIC/LYMPHATIC:  negative for unusual bleeding  MUSCULOSKELETAL: Low back pain  BEHAVIOR/PSYCH: No hallucinations  SKIN: negative for rash  GENITOURINARY: Has some urinary retention this admission; acute kidney injury earlier in stay  NEUROLOGIC:  see HPI    Allergies:     Allergies   Allergen Reactions    Aldactone [Spironolactone] Other (See Comments)     Hyperkalemia--4/16/15        Current Medications:       PN-Adult 3-in-1 Peripheral Line, Continuous TPN      dextrose 5 % and 0.45 % sodium chloride infusion, Continuous      labetalol (NORMODYNE;TRANDATE) injection 30 mg, Q6H      morphine (PF) injection 1 mg, Q4H PRN      ceFAZolin (ANCEF) 2000 mg in sterile water 20 mL IV syringe, See Admin Instructions      atorvastatin (LIPITOR) tablet 10 mg, Daily      phenol 1.4 % mouth spray 1 spray, Q2H PRN      trimethobenzamide (TIGAN) injection 200 mg, Q6H PRN      brimonidine (ALPHAGAN) 0.2 % ophthalmic solution 1 drop, BID      ipratropium-albuterol (DUONEB) nebulizer solution 3 mL, Q6H PRN      latanoprost (XALATAN) 0.005 % ophthalmic solution 1 drop, Nightly      nitroGLYCERIN (NITROSTAT) SL tablet 0.4 mg, Q5 Min PRN      timolol (TIMOPTIC) 0.5 % ophthalmic solution 1 drop, BID      sodium chloride flush 0.9 % injection 10 mL, 2 times per day      sodium chloride flush 0.9 % injection 10 mL, PRN      enoxaparin (LOVENOX) injection 30 mg, Daily      polyethylene glycol (GLYCOLAX) packet 17 g, Daily PRN      acetaminophen (TYLENOL) tablet 650 mg, Q6H PRN    Or      acetaminophen (TYLENOL) suppository 650 mg, Q6H PRN      glucose (GLUTOSE) 40 % oral gel 15 g, PRN      dextrose 50 % IV solution, PRN      glucagon (rDNA) injection 1 mg, PRN      dextrose 5 % solution, PRN      insulin lispro (HUMALOG) injection vial 0-12 Units, Q4H      hydrALAZINE (APRESOLINE) injection 10 mg, Q6H PRN      influenza A&B vaccine (FLUAD QUADRIVALENT) injection 0.5 mL, Prior to discharge       Medications Prior to Admission: hydrALAZINE (APRESOLINE) 25 MG tablet, Take 1 tablet by mouth 3 times daily Use if blood pressure is greater than 180/100  insulin NPH (NOVOLIN N) 100 UNIT/ML injection vial, Inject 10 Units into the skin every evening. insulin NPH (NOVOLIN N) 100 UNIT/ML injection vial, Inject 12 Units into the skin every morning. carvedilol (COREG) 25 MG tablet, Take 25 mg by mouth 2 times daily (with meals). sertraline (ZOLOFT) 25 MG tablet, Take 25 mg by mouth daily. cloNIDine (CATAPRES) 0.2 MG/24HR, Place 2 patches onto the skin once a week. Changed on Mondays  aspirin 325 MG EC tablet, Take 325 mg by mouth daily. Omega 3-6-9 Fatty Acids (TRIPLE OMEGA-3-6-9 PO), Take 1 tablet by mouth 2 times daily. simethicone (MYLICON) 80 MG chewable tablet, Take 80 mg by mouth every 6 hours as needed for Flatulence. brimonidine (ALPHAGAN) 0.2 % ophthalmic solution, Place 1 drop into the left eye 2 times daily. timolol (TIMOPTIC) 0.5 % ophthalmic solution, Place 1 drop into the left eye 2 times daily. ferrous sulfate 325 (65 FE) MG tablet, Take 1 tablet by mouth 2 times daily (with meals). ipratropium-albuterol (DUONEB) 0.5-2.5 (3) MG/3ML SOLN nebulizer solution, Inhale 3 mLs into the lungs every 6 hours as needed for Shortness of Breath. isosorbide mononitrate (IMDUR) 30 MG CR tablet, Take 1 tablet by mouth daily. simvastatin (ZOCOR) 10 MG tablet, Take 10 mg by mouth daily. tamsulosin (FLOMAX) 0.4 MG capsule, Take 0.4 mg by mouth every evening.   travoprost, benzalkonium, (TRAVATAN) 0.004 % ophthalmic solution, Place 1 drop into the left eye nightly. nitroGLYCERIN (NITROSTAT) 0.4 MG SL tablet, Place 0.4 mg under the tongue every 5 minutes as needed. docusate sodium (COLACE) 100 MG capsule, Take 100 mg by mouth daily as needed. Indications: Constipation  NONFORMULARY, Omega XL, contains  Green lipped mussel oil extract, EPA, DHA, ETA, and HORTENCIA  latanoprost (XALATAN) 0.005 % ophthalmic solution, Place 1 drop into the left eye nightly. OXYGEN, Inhale 4 L/min into the lungs. minoxidil (LONITEN) 10 MG tablet, Take 50 mg by mouth daily. Physical Exam:  BP (!) 210/88   Pulse 86   Temp 98.7 °F (37.1 °C) (Oral)   Resp 18   Ht 5' 11\" (1.803 m)   Wt 190 lb (86.2 kg)   SpO2 98%   BMI 26.50 kg/m²  I Body mass index is 26.5 kg/m². I   Wt Readings from Last 1 Encounters:   02/20/21 190 lb (86.2 kg)        GENERAL: he is in no apparent distress, and appears stated age   EYE: Deferred ophthalmology who had dilated the patient's eyes. Eyes did not dilate well. A corneal scar obscures visualization on the right and some scarring on the left as well with the fundus not being well seen  CARDIOVASCULAR:  Heart regular rate and rhythm. No carotid bruits detected, though has suction through NG which is noisy. NEUROLOGIC:  Level of Alertness: alert  Orientation: oriented to person, place and time  Memory and Fund of Knowledge:  normal  Attention/Concentration: normal  Language: no aphasia  Cranial Nerves: Corneal scarring and pupils hard to say ; extraocular muscles intact; facial strength and sensation are intact; hard of hearing; the palate raises midline, and the tongue protrudes midline; shoulder shrug is symmetric  Motor Exam: Normal tone in all extremities. Strength is MRC grade 5 in all extremities bilaterally.   Sensory: Sensory symmetric to light touch; tingly feeling to touch in feet  Coordination: Cerebellar function is intact for rapid alternating movements  Deep Tendon Reflexes: Crossed abductor at the knees noted but elsewhere not brisk  Plantar Responses:  Downgoing  Abnormal movements: none  Station and gait: Did not get up for safety reasons/visual impairment    Diagnostics:  CBC:   Lab Results   Component Value Date    WBC 9.2 02/25/2021    RBC 4.27 02/25/2021    HGB 13.6 02/25/2021    HCT 41.4 02/25/2021    MCV 97.0 02/25/2021    MCH 31.9 02/25/2021    MCHC 32.9 02/25/2021    RDW 14.1 02/25/2021     02/25/2021    MPV 10.4 02/25/2021     CMP:    Lab Results   Component Value Date     02/25/2021    K 3.7 02/25/2021    K 4.2 02/21/2021     02/25/2021    CO2 27 02/25/2021    BUN 29 02/25/2021    CREATININE 1.3 02/25/2021    GFRAA >60 02/25/2021    LABGLOM >60 02/25/2021    GLUCOSE 255 02/25/2021    PROT 7.2 02/20/2021    LABALBU 4.2 02/20/2021    CALCIUM 8.5 02/25/2021    BILITOT 0.7 02/20/2021    ALKPHOS 56 02/20/2021    AST 24 02/20/2021    ALT 19 02/20/2021     PT/INR:    Lab Results   Component Value Date    PROTIME 13.0 02/20/2021    INR 1.1 02/20/2021       MRI brain images reviewed: In addition to chronic small vessel disease with old lacunar infarcts and atrophy the patient has a tiny acute to subacute subcortical ischemic stroke in the left frontal lobe. Impression:  1)  Tiny, acute to subacute, ischemic stroke in the left middle cerebral artery territory, most likely lacunar infarction due to small vessel disease. Risk factors of hypertension, hyperlipidemia, and diabetes. 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 Recommendations :  Daily baby aspirin  continue statin; increase if LDL is significantly above 70  Okay to resume patient's antihypertensive medications  Carotid ultrasound  Lipid profile; hemoglobin A1c  Check sed rate    I appreciate the opportunity to participate in this pleasant patient's care.     Electronically signed by Tristin Booth DO on 2/25/2021 at 2:57 PM

## 2021-02-25 NOTE — CARE COORDINATION
For OR today- possible bowel resection/possible colostomy. NG to LIS. O2 3lNC- wears home O2 2lNC through [facility] [address] [facility]. Pt is utilizing [employer] benefits for this admission. Pt also has Medicare Part A [id number]. Spoke w/ Fran Zarateks @ [address] [facility] [phone] - pt only 60% service connected-pt would only be eligible for acute rehab under VA benefits- he is not eligible for snf w/ [facility]. Spoke w/ patient and pt's brother Satya Moe [phone]. Discharge planning discussed. PT am-pac 14 on 2/22. [facility]ose [facility] if [facility] criteria- referral made to [facility] to follow. Chose [facility]- referral made to [facility] to follow. Neuro consulted for ? acute ischemia on MRI. Will follow Lyla H[facility]     The Plan for Transition of Care is related to the following treatment goals: physical conditioning, post op rehab    The Patient and/or patient representative Chiqui Varela was provided with a choice of provider and agrees   with the discharge plan. [x] Yes [] No    Freedom of choice list was provided with basic dialogue that supports the patient's individualized plan of care/goals, treatment preferences and shares the quality data associated with the providers.  [x] Yes [] No      13:45 Per [facility] @ [facility], they have accepted patient Lyla [facility]

## 2021-02-25 NOTE — CONSULTS
Inpatient Cardiology Consultation      Reason for Consult:  Preoperative Cardiac Evaluation     Consulting Physician: Dr Florin Kinsey     Requesting Physician:  Dr Venkat Fabian    Date of Consultation: 2/25/2021    HISTORY OF PRESENT ILLNESS:   Mr Leta Ott is an [de-identified] yo male who was seen by Dr Geovanni Gray in 2014, but otherwise not following with [id number] [facility]. He typically follows with the [place]. Past medical history includes coronary artery disease s/p CABG 1994 and stents 2018 [facility] per patient, no records), HFpEF, chronic RBBB, hypertension, hyperlipidemia, type 2 diabetes mellitus, chronic kidney disease, COPD on home O2, peripheral artery disease, legally blind in right eye,      Presented to [address] [place] [address] 2/20/2021 with abdominal pain   He was admitted for small bowl obstruction on CT abdomen/pelvis and EMELY. General surgery was consulted with conservative management with plans for laparoscopic intervention if not improvement. Repeat CT ab/pelvis shows possible volvulus. He is scheduled for diagnostic laparoscopy with possible laparotomy and small bowel resection 2/24/2021. Surgery delayed for left eye vision loss. Ophthalmology was consulted, stating vision loss most likely secondary to glaucoma. MRI brain shows questionable punctate 4mm focus of scute ischemia in the left frontal lobe of the white matter. ASA given and Labetalol increased. Cardiology was asked to see the patient for preoperative cardiac evaluation. He lives independently in a two story home - does not use ambulation assistance devices. Denies chest pain or shortness of breath with ADL. Has help from his family to get his groceries and household chores. Please note: past medical records were reviewed per electronic medical record (EMR) - see detailed reports under Past Medical/ Surgical History. Past Medical and Surgical History:  1. Coronary artery disease. 2. Inferior wall myocardial infarction.   3. 1994 status post aortocoronary bypass grafting x4 ([facility] in [place], further details unknown at this time). 4.  November 13, 2007, adenosine nuclear stress test:  Normal examination with no evidence of left ventricular myocardium at risk for stress-induced ischemia with an ejection fraction of 46%. 5. November 10, 2008, 2-D echocardiogram showed moderate concentric left ventricular hypertrophy. Ejection fraction visually estimated at 60%. Values were not well-visualized. (As interpreted by Dr. Shaquille Estevez.)  6. May 24, 2013, Lexiscan nuclear stress study:  There is an area of relative hypokinesia of the LV myocardial contraction in the inferior wall toward the basal region where diminished radionuclide activity is seen and the post pharmacological stress suspicious for an area of pharmacological-induced ischemia. Planned for medical management with nitrate added at that time. 7. LHC and stents placed 2018 ([facility] [place] per patient - records not available. 8. HFpEF   9. May 23, 2013, 2-D echocardiogram (as interpreted by Dr. Shaquille Estevez): Normal left ventricle size, mild concentric left ventricular hypertrophy. Ejection fraction visually estimated at 55%. Stage 2 diastolic dysfunction. 10. September 22, 2014, 2-D echocardiogram (as interpreted by Dr. Shaquille Estevez): Normal left ventricle size. Moderate concentric left ventricular hypertrophy. Ejection fraction visually estimated at 70%. 11. Peripheral arterial disease  12. May 2013, carotid ultrasound: There is moderate to severe anatomical stenosis in the left bulbar region/ICA proximal segment by calcified plaque. Hemodynamic stenosis is predominately seen in the right ICA. On the left side there is also relatively increased peak systolic velocity in the middistal segment of the left ICA and also seen on the left ECA due to the presence of the prominent calcified plaques in the left bulbar region and ICA. Can further evaluate with MRA of the neck.   13. August 12, 2014, office visit with Vascular Surgery:  Stable. Asymptomatic carotid artery disease (right 50%-69%, left 60%-79%), as per Dr. Collin Dominguez. 14. Chronic right bundle branch block. 15. Hypertension. 16. Type 2 Diabetes mellitus. 17. Hyperlipidemia. 18. Chronic kidney disease, stage 3 (baseline creatinine 1.3). 23. November 15, 2007, duplex of renal arteries showed normal bilateral renal blood flow and function. 20. Iron deficiency anemia. 21. Chronic obstructive pulmonary disease/chronic home oxygen use. 22. Remote tobacco abuse. 23. Remote alcohol abuse. 24. Osteoarthritis. 25. Osteoporosis. 26. Glaucoma: Status post surgery to left eye with cataract removal. 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. 27. 2007, status post most recent surgery to right eye. 28. TIA (further details unknown). 29. ALDACTONE intolerance (hyperkalemia reaction). Medications Prior to admit:  Prior to Admission medications    Medication Sig Start Date End Date Taking? Authorizing Provider   hydrALAZINE (APRESOLINE) 25 MG tablet Take 1 tablet by mouth 3 times daily Use if blood pressure is greater than 180/100 12/31/20  Yes Merissa Cabrera MD   insulin NPH (NOVOLIN N) 100 UNIT/ML injection vial Inject 10 Units into the skin every evening. 9/22/14  Yes Aranza Juarez MD   insulin NPH (NOVOLIN N) 100 UNIT/ML injection vial Inject 12 Units into the skin every morning. 9/23/14  Yes Aranza Juarez MD   carvedilol (COREG) 25 MG tablet Take 25 mg by mouth 2 times daily (with meals). Yes Historical Provider, MD   sertraline (ZOLOFT) 25 MG tablet Take 25 mg by mouth daily. Yes Historical Provider, MD   cloNIDine (CATAPRES) 0.2 MG/24HR Place 2 patches onto the skin once a week. Changed on Mondays   Yes Historical Provider, MD   aspirin 325 MG EC tablet Take 325 mg by mouth daily. Yes Historical Provider, MD   Omega 3-6-9 Fatty Acids (TRIPLE OMEGA-3-6-9 PO) Take 1 tablet by mouth 2 times daily.    Yes Historical Provider, MD simethicone (MYLICON) 80 MG chewable tablet Take 80 mg by mouth every 6 hours as needed for Flatulence. Yes Historical Provider, MD   brimonidine (ALPHAGAN) 0.2 % ophthalmic solution Place 1 drop into the left eye 2 times daily. Yes Historical Provider, MD   timolol (TIMOPTIC) 0.5 % ophthalmic solution Place 1 drop into the left eye 2 times daily. Yes Historical Provider, MD   ferrous sulfate 325 (65 FE) MG tablet Take 1 tablet by mouth 2 times daily (with meals). 5/28/13  Yes Selvin Wilson MD   ipratropium-albuterol (DUONEB) 0.5-2.5 (3) MG/3ML SOLN nebulizer solution Inhale 3 mLs into the lungs every 6 hours as needed for Shortness of Breath. 5/28/13  Yes Selvin Wilson MD   isosorbide mononitrate (IMDUR) 30 MG CR tablet Take 1 tablet by mouth daily. 5/28/13  Yes Selvin Wilson MD   simvastatin (ZOCOR) 10 MG tablet Take 10 mg by mouth daily. Yes Historical Provider, MD   tamsulosin (FLOMAX) 0.4 MG capsule Take 0.4 mg by mouth every evening. Yes Historical Provider, MD   travoprost, benzalkonium, (TRAVATAN) 0.004 % ophthalmic solution Place 1 drop into the left eye nightly. Yes Historical Provider, MD   nitroGLYCERIN (NITROSTAT) 0.4 MG SL tablet Place 0.4 mg under the tongue every 5 minutes as needed. Yes Historical Provider, MD   docusate sodium (COLACE) 100 MG capsule Take 100 mg by mouth daily as needed. Indications: Constipation   Yes Historical Provider, MD   NONFORMULARY Omega XL, contains  Green lipped mussel oil extract, EPA, DHA, ETA, and HORTENCIA    Historical Provider, MD   latanoprost (XALATAN) 0.005 % ophthalmic solution Place 1 drop into the left eye nightly. Historical Provider, MD   OXYGEN Inhale 4 L/min into the lungs. Historical Provider, MD   minoxidil (LONITEN) 10 MG tablet Take 50 mg by mouth daily.     Historical Provider, MD       Current Medications:    Current Facility-Administered Medications: dextrose 5 % and 0.45 % sodium chloride infusion, , Intravenous, Continuous  labetalol (NORMODYNE;TRANDATE) injection 30 mg, 30 mg, Intravenous, Q6H  morphine (PF) injection 1 mg, 1 mg, Intravenous, Q4H PRN  ceFAZolin (ANCEF) 2000 mg in sterile water 20 mL IV syringe, 2,000 mg, Intravenous, See Admin Instructions  atorvastatin (LIPITOR) tablet 10 mg, 10 mg, Oral, Daily  phenol 1.4 % mouth spray 1 spray, 1 spray, Mouth/Throat, Q2H PRN  trimethobenzamide (TIGAN) injection 200 mg, 200 mg, Intramuscular, Q6H PRN  brimonidine (ALPHAGAN) 0.2 % ophthalmic solution 1 drop, 1 drop, Left Eye, BID  ipratropium-albuterol (DUONEB) nebulizer solution 3 mL, 3 mL, Inhalation, Q6H PRN  latanoprost (XALATAN) 0.005 % ophthalmic solution 1 drop, 1 drop, Left Eye, Nightly  nitroGLYCERIN (NITROSTAT) SL tablet 0.4 mg, 0.4 mg, Sublingual, Q5 Min PRN  timolol (TIMOPTIC) 0.5 % ophthalmic solution 1 drop, 1 drop, Left Eye, BID  sodium chloride flush 0.9 % injection 10 mL, 10 mL, Intravenous, 2 times per day  sodium chloride flush 0.9 % injection 10 mL, 10 mL, Intravenous, PRN  enoxaparin (LOVENOX) injection 30 mg, 30 mg, Subcutaneous, Daily  polyethylene glycol (GLYCOLAX) packet 17 g, 17 g, Oral, Daily PRN  acetaminophen (TYLENOL) tablet 650 mg, 650 mg, Oral, Q6H PRN **OR** acetaminophen (TYLENOL) suppository 650 mg, 650 mg, Rectal, Q6H PRN  glucose (GLUTOSE) 40 % oral gel 15 g, 15 g, Oral, PRN  dextrose 50 % IV solution, 12.5 g, Intravenous, PRN  glucagon (rDNA) injection 1 mg, 1 mg, Intramuscular, PRN  dextrose 5 % solution, 100 mL/hr, Intravenous, PRN  insulin lispro (HUMALOG) injection vial 0-12 Units, 0-12 Units, Subcutaneous, Q4H  hydrALAZINE (APRESOLINE) injection 10 mg, 10 mg, Intravenous, Q6H PRN  influenza A&B vaccine (FLUAD QUADRIVALENT) injection 0.5 mL, 0.5 mL, Intramuscular, Prior to discharge    Allergies:  Aldactone [spironolactone]    Social History:    Lives independently - no SOB/CP with ADL. Has family to help with his house chores and groceries.  Does not use ambulation assistance devices. Wears supplemental O2. Previous tobacco abuse: quit 30-40 years ago, smoked for 17 years   Previous alcohol abuse: quit 30-40 years ago   Denies illicit drug use     Full code       Family History: This information was not obtained at this time as it is found noncontributory secondary to the patients advanced age. REVIEW OF SYSTEMS:     · Constitutional: Denies fevers, chills, night sweats, and fatigue  · HEENT: Denies headaches, nose bleeds, and blurred vision,oral pain, abscess or lesion. · Musculoskeletal: Denies falls, pain to BLE with ambulation and edema to BLE. · Neurological: Denies dizziness and lightheadedness, numbness and tingling  · Cardiovascular: Denies chest pain, palpitations, and feelings of heart racing. · Respiratory: Denies orthopnea and PND, cough, COSTA  · Gastrointestinal: Denies heartburn, nausea/vomiting, diarrhea and constipation, black/bloody, and tarry stools. · Genitourinary: Denies dysuria and hematuria  · Hematologic: Denies excessive bruising or bleeding  · Lymphatic: Denies lumps and bumps to neck, axilla, breast, and groin      PHYSICAL EXAM:   BP (!) 144/64   Pulse 78   Temp 98.2 °F (36.8 °C) (Oral)   Resp 18   Ht 5' 11\" (1.803 m)   Wt 190 lb (86.2 kg)   SpO2 96%   BMI 26.50 kg/m²   CONST:  Well developed,  male who appears his stated age. Awake, alert, cooperative, no apparent distress  HEENT:   Head- Normocephalic, atraumatic   Eyes- hazy cornea, evidence of glaucoma bilaterally   Throat- Oral mucosa pink and moist  Neck-  No stridor, trachea midline, no jugular venous distention. CHEST: Chest symmetrical and non-tender to palpation. RESPIRATORY: Lung sounds clear throughout fields bilaterally. No wheeze or rhonchi noted. CARDIOVASCULAR:     No carotid bruit noted bilaterally   Heart Ausculation- Regular rate and rhythm, no murmur. PV: No lower extremity edema. No varicosities. ABDOMEN: Soft, non-tender to light palpation. Bowel sounds present. MS: Good muscle strength and tone. No atrophy or abnormal movements. : Deferred   SKIN: Warm and dry no statis dermatitis or ulcers   NEURO / PSYCH: Oriented to person, place and time. Speech clear and appropriate. Follows all commands. Pleasant affect     DATA:    ECG: SR first degree block, HR 74   Tele strips:  SR HR 60-70s     Diagnostic:      Intake/Output Summary (Last 24 hours) at 2/25/2021 0741  Last data filed at 2/25/2021 0615  Gross per 24 hour   Intake 1215 ml   Output 4350 ml   Net -3135 ml       Labs:     BMP:   Recent Labs     02/24/21 2000 02/25/21  0230   * 151*   K 4.2 3.7   CO2 28 27   BUN 35* 29*   CREATININE 1.3* 1.3*   LABGLOM >60 >60   CALCIUM 8.5* 8.5*     Mag:   Recent Labs     02/23/21  0300   MG 2.1     HgA1c:   Lab Results   Component Value Date    LABA1C 6.5 (H) 02/01/2014     FASTING LIPID PANEL:  Lab Results   Component Value Date    CHOL 149 02/01/2014    HDL 49 02/01/2014    LDLCALC 89 02/01/2014    TRIG 56 02/01/2014     Assessment and Plan per Dr Jaylyn Farah   Electronically signed by [facility], [id number] on 2/25/2021 at 7:41 AM     ______________________________________________________________________  I independently interviewed and examined the patient. I have reviewed the above documentation completed by the GURWINDER. Please see my additional contributions to the HPI, physical exam, and assessment / medical decision making.     HPI, ROS, PMH, PSH, FMH, SH, and medications independently reviewed (agree; see above documentation)    Review of Systems:  Cardiac: As per HPI  General: No fever, chills  Pulmonary: As per HPI  GI: No nausea, vomiting  Musculoskeletal: NELSON x 4, no focal motor deficits  Skin: Intact, no rashes  Neuro/Psych: No headache or seizures    Physical Exam:  BP (!) 210/88   Pulse 86   Temp 98.7 °F (37.1 °C) (Oral)   Resp 18   Ht 5' 11\" (1.803 m)   Wt 190 lb (86.2 kg)   SpO2 98%   BMI 26.50 kg/m²   Appearance: Awake, alert, no acute respiratory distress  Skin: Intact, no rash  Head: Normocephalic, atraumatic  Neck: Supple, no carotid bruits  Lungs: Decreased breathing sounds bilaterally. Cardiac: Regular rate and rhythm, +S1S2, no murmurs apparent  Abdomen: Soft, +bowel sounds  Extremities: Moves all extremities x 4, no lower extremity edema  Neurologic: No focal motor deficits apparent, normal mood and affect    Assessment/Plan:  28-year-old male with past medical history of CABG 1994 with unknown anatomy, heart failure preserved ejection fraction and moderate left ventricular hypertrophy, right bundle branch block, hypertension, diabetes mellitus, COPD on 3 L, chronic kidney disease, peripheral artery disease, blindness. He presents with small bowel obstruction and an EMELY on antibiotic NG tube suctioning. We are consulted for preoperative cardiac evaluation. Recommendations  METS below 4 with a risk stratification index of 4. I recommend ischemic work-up prior to surgery. Continue aspirin 81 mg daily and carvedilol 25 mg BID perioperatively. Resume hypertension home medications if safe from neurological standpoint.     Rik Cavanaugh MD  [facility]) Cardiology

## 2021-02-26 ENCOUNTER — ANESTHESIA (OUTPATIENT)
Dept: OPERATING ROOM | Age: 81
DRG: 335 | End: 2021-02-26
Payer: OTHER GOVERNMENT

## 2021-02-26 ENCOUNTER — ANESTHESIA EVENT (OUTPATIENT)
Dept: OPERATING ROOM | Age: 81
DRG: 335 | End: 2021-02-26
Payer: OTHER GOVERNMENT

## 2021-02-26 VITALS — SYSTOLIC BLOOD PRESSURE: 132 MMHG | OXYGEN SATURATION: 99 % | DIASTOLIC BLOOD PRESSURE: 63 MMHG | TEMPERATURE: 100 F

## 2021-02-26 LAB
ANION GAP SERPL CALCULATED.3IONS-SCNC: 3 MMOL/L (ref 7–16)
BUN BLDV-MCNC: 20 MG/DL (ref 8–23)
CALCIUM SERPL-MCNC: 9 MG/DL (ref 8.6–10.2)
CHLORIDE BLD-SCNC: 122 MMOL/L (ref 98–107)
CHOLESTEROL, TOTAL: 120 MG/DL (ref 0–199)
CO2: 31 MMOL/L (ref 22–29)
CREAT SERPL-MCNC: 1.3 MG/DL (ref 0.7–1.2)
GFR AFRICAN AMERICAN: >60
GFR NON-AFRICAN AMERICAN: >60 ML/MIN/1.73
GLUCOSE BLD-MCNC: 252 MG/DL (ref 74–99)
HBA1C MFR BLD: 7.6 % (ref 4–5.6)
HDLC SERPL-MCNC: 48 MG/DL
LDL CHOLESTEROL CALCULATED: 51 MG/DL (ref 0–99)
MAGNESIUM: 2.5 MG/DL (ref 1.6–2.6)
METER GLUCOSE: 157 MG/DL (ref 74–99)
METER GLUCOSE: 158 MG/DL (ref 74–99)
METER GLUCOSE: 186 MG/DL (ref 74–99)
METER GLUCOSE: 192 MG/DL (ref 74–99)
METER GLUCOSE: 211 MG/DL (ref 74–99)
METER GLUCOSE: 235 MG/DL (ref 74–99)
PHOSPHORUS: 1.3 MG/DL (ref 2.5–4.5)
POTASSIUM SERPL-SCNC: 4.1 MMOL/L (ref 3.5–5)
SEDIMENTATION RATE, ERYTHROCYTE: 15 MM/HR (ref 0–15)
SODIUM BLD-SCNC: 156 MMOL/L (ref 132–146)
TRIGL SERPL-MCNC: 106 MG/DL (ref 0–149)
VLDLC SERPL CALC-MCNC: 21 MG/DL

## 2021-02-26 PROCEDURE — 2500000003 HC RX 250 WO HCPCS: Performed by: NURSE ANESTHETIST, CERTIFIED REGISTERED

## 2021-02-26 PROCEDURE — 2580000003 HC RX 258: Performed by: INTERNAL MEDICINE

## 2021-02-26 PROCEDURE — 2500000003 HC RX 250 WO HCPCS: Performed by: FAMILY MEDICINE

## 2021-02-26 PROCEDURE — 80048 BASIC METABOLIC PNL TOTAL CA: CPT

## 2021-02-26 PROCEDURE — 7100000001 HC PACU RECOVERY - ADDTL 15 MIN: Performed by: SURGERY

## 2021-02-26 PROCEDURE — 6360000002 HC RX W HCPCS: Performed by: INTERNAL MEDICINE

## 2021-02-26 PROCEDURE — 2700000000 HC OXYGEN THERAPY PER DAY

## 2021-02-26 PROCEDURE — 83735 ASSAY OF MAGNESIUM: CPT

## 2021-02-26 PROCEDURE — 7100000000 HC PACU RECOVERY - FIRST 15 MIN: Performed by: SURGERY

## 2021-02-26 PROCEDURE — 99232 SBSQ HOSP IP/OBS MODERATE 35: CPT | Performed by: PSYCHIATRY & NEUROLOGY

## 2021-02-26 PROCEDURE — 3600000004 HC SURGERY LEVEL 4 BASE: Performed by: SURGERY

## 2021-02-26 PROCEDURE — APPSS30 APP SPLIT SHARED TIME 16-30 MINUTES: Performed by: NURSE PRACTITIONER

## 2021-02-26 PROCEDURE — 99223 1ST HOSP IP/OBS HIGH 75: CPT | Performed by: SURGERY

## 2021-02-26 PROCEDURE — 99232 SBSQ HOSP IP/OBS MODERATE 35: CPT | Performed by: INTERNAL MEDICINE

## 2021-02-26 PROCEDURE — 36415 COLL VENOUS BLD VENIPUNCTURE: CPT

## 2021-02-26 PROCEDURE — 6370000000 HC RX 637 (ALT 250 FOR IP): Performed by: INTERNAL MEDICINE

## 2021-02-26 PROCEDURE — 2709999900 HC NON-CHARGEABLE SUPPLY: Performed by: SURGERY

## 2021-02-26 PROCEDURE — 0DN80ZZ RELEASE SMALL INTESTINE, OPEN APPROACH: ICD-10-PCS | Performed by: STUDENT IN AN ORGANIZED HEALTH CARE EDUCATION/TRAINING PROGRAM

## 2021-02-26 PROCEDURE — 6360000002 HC RX W HCPCS: Performed by: ANESTHESIOLOGY

## 2021-02-26 PROCEDURE — 6360000002 HC RX W HCPCS: Performed by: NURSE ANESTHETIST, CERTIFIED REGISTERED

## 2021-02-26 PROCEDURE — 2060000000 HC ICU INTERMEDIATE R&B

## 2021-02-26 PROCEDURE — 85651 RBC SED RATE NONAUTOMATED: CPT

## 2021-02-26 PROCEDURE — 3700000000 HC ANESTHESIA ATTENDED CARE: Performed by: SURGERY

## 2021-02-26 PROCEDURE — 84100 ASSAY OF PHOSPHORUS: CPT

## 2021-02-26 PROCEDURE — 2720000010 HC SURG SUPPLY STERILE: Performed by: SURGERY

## 2021-02-26 PROCEDURE — 6360000002 HC RX W HCPCS: Performed by: SURGERY

## 2021-02-26 PROCEDURE — 80061 LIPID PANEL: CPT

## 2021-02-26 PROCEDURE — 3700000001 HC ADD 15 MINUTES (ANESTHESIA): Performed by: SURGERY

## 2021-02-26 PROCEDURE — 6360000002 HC RX W HCPCS: Performed by: STUDENT IN AN ORGANIZED HEALTH CARE EDUCATION/TRAINING PROGRAM

## 2021-02-26 PROCEDURE — 82962 GLUCOSE BLOOD TEST: CPT

## 2021-02-26 PROCEDURE — 2580000003 HC RX 258: Performed by: NURSE ANESTHETIST, CERTIFIED REGISTERED

## 2021-02-26 PROCEDURE — 83036 HEMOGLOBIN GLYCOSYLATED A1C: CPT

## 2021-02-26 PROCEDURE — 3600000014 HC SURGERY LEVEL 4 ADDTL 15MIN: Performed by: SURGERY

## 2021-02-26 RX ORDER — HEPARIN SODIUM (PORCINE) LOCK FLUSH IV SOLN 100 UNIT/ML 100 UNIT/ML
3 SOLUTION INTRAVENOUS EVERY 12 HOURS SCHEDULED
Status: DISCONTINUED | OUTPATIENT
Start: 2021-02-26 | End: 2021-03-04 | Stop reason: HOSPADM

## 2021-02-26 RX ORDER — MEPERIDINE HYDROCHLORIDE 25 MG/ML
12.5 INJECTION INTRAMUSCULAR; INTRAVENOUS; SUBCUTANEOUS EVERY 5 MIN PRN
Status: DISCONTINUED | OUTPATIENT
Start: 2021-02-26 | End: 2021-02-26 | Stop reason: HOSPADM

## 2021-02-26 RX ORDER — OXYCODONE HYDROCHLORIDE 5 MG/1
5 TABLET ORAL EVERY 4 HOURS PRN
Status: DISCONTINUED | OUTPATIENT
Start: 2021-02-26 | End: 2021-03-04 | Stop reason: HOSPADM

## 2021-02-26 RX ORDER — FENTANYL CITRATE 50 UG/ML
INJECTION, SOLUTION INTRAMUSCULAR; INTRAVENOUS PRN
Status: DISCONTINUED | OUTPATIENT
Start: 2021-02-26 | End: 2021-02-26 | Stop reason: SDUPTHER

## 2021-02-26 RX ORDER — OXYCODONE HYDROCHLORIDE 5 MG/1
10 TABLET ORAL EVERY 4 HOURS PRN
Status: DISCONTINUED | OUTPATIENT
Start: 2021-02-26 | End: 2021-03-04 | Stop reason: HOSPADM

## 2021-02-26 RX ORDER — NEOSTIGMINE METHYLSULFATE 1 MG/ML
INJECTION, SOLUTION INTRAVENOUS PRN
Status: DISCONTINUED | OUTPATIENT
Start: 2021-02-26 | End: 2021-02-26 | Stop reason: SDUPTHER

## 2021-02-26 RX ORDER — LIDOCAINE HYDROCHLORIDE 10 MG/ML
5 INJECTION, SOLUTION EPIDURAL; INFILTRATION; INTRACAUDAL; PERINEURAL ONCE
Status: COMPLETED | OUTPATIENT
Start: 2021-02-26 | End: 2021-02-27

## 2021-02-26 RX ORDER — LIDOCAINE HYDROCHLORIDE 20 MG/ML
INJECTION, SOLUTION EPIDURAL; INFILTRATION; INTRACAUDAL; PERINEURAL PRN
Status: DISCONTINUED | OUTPATIENT
Start: 2021-02-26 | End: 2021-02-26 | Stop reason: SDUPTHER

## 2021-02-26 RX ORDER — DEXTROSE MONOHYDRATE 100 MG/ML
INJECTION, SOLUTION INTRAVENOUS CONTINUOUS
Status: DISCONTINUED | OUTPATIENT
Start: 2021-02-26 | End: 2021-03-03

## 2021-02-26 RX ORDER — ONDANSETRON 2 MG/ML
4 INJECTION INTRAMUSCULAR; INTRAVENOUS
Status: DISCONTINUED | OUTPATIENT
Start: 2021-02-26 | End: 2021-02-26 | Stop reason: HOSPADM

## 2021-02-26 RX ORDER — GLYCOPYRROLATE 1 MG/5 ML
SYRINGE (ML) INTRAVENOUS PRN
Status: DISCONTINUED | OUTPATIENT
Start: 2021-02-26 | End: 2021-02-26 | Stop reason: SDUPTHER

## 2021-02-26 RX ORDER — SODIUM CHLORIDE 0.9 % (FLUSH) 0.9 %
10 SYRINGE (ML) INJECTION PRN
Status: DISCONTINUED | OUTPATIENT
Start: 2021-02-26 | End: 2021-03-04 | Stop reason: HOSPADM

## 2021-02-26 RX ORDER — ROCURONIUM BROMIDE 10 MG/ML
INJECTION, SOLUTION INTRAVENOUS PRN
Status: DISCONTINUED | OUTPATIENT
Start: 2021-02-26 | End: 2021-02-26 | Stop reason: SDUPTHER

## 2021-02-26 RX ORDER — HEPARIN SODIUM (PORCINE) LOCK FLUSH IV SOLN 100 UNIT/ML 100 UNIT/ML
3 SOLUTION INTRAVENOUS PRN
Status: DISCONTINUED | OUTPATIENT
Start: 2021-02-26 | End: 2021-03-04 | Stop reason: HOSPADM

## 2021-02-26 RX ORDER — PROPOFOL 10 MG/ML
INJECTION, EMULSION INTRAVENOUS PRN
Status: DISCONTINUED | OUTPATIENT
Start: 2021-02-26 | End: 2021-02-26 | Stop reason: SDUPTHER

## 2021-02-26 RX ORDER — SODIUM CHLORIDE 9 MG/ML
INJECTION, SOLUTION INTRAVENOUS CONTINUOUS PRN
Status: DISCONTINUED | OUTPATIENT
Start: 2021-02-26 | End: 2021-02-26 | Stop reason: SDUPTHER

## 2021-02-26 RX ORDER — ONDANSETRON 2 MG/ML
INJECTION INTRAMUSCULAR; INTRAVENOUS PRN
Status: DISCONTINUED | OUTPATIENT
Start: 2021-02-26 | End: 2021-02-26 | Stop reason: SDUPTHER

## 2021-02-26 RX ADMIN — INSULIN LISPRO 2 UNITS: 100 INJECTION, SOLUTION INTRAVENOUS; SUBCUTANEOUS at 06:50

## 2021-02-26 RX ADMIN — PHENYLEPHRINE HYDROCHLORIDE 100 MCG: 10 INJECTION INTRAVENOUS at 16:59

## 2021-02-26 RX ADMIN — ROCURONIUM BROMIDE 50 MG: 10 INJECTION, SOLUTION INTRAVENOUS at 16:42

## 2021-02-26 RX ADMIN — LABETALOL HYDROCHLORIDE 30 MG: 5 INJECTION INTRAVENOUS at 12:36

## 2021-02-26 RX ADMIN — TIMOLOL MALEATE 1 DROP: 5 SOLUTION/ DROPS OPHTHALMIC at 08:29

## 2021-02-26 RX ADMIN — INSULIN LISPRO 4 UNITS: 100 INJECTION, SOLUTION INTRAVENOUS; SUBCUTANEOUS at 18:54

## 2021-02-26 RX ADMIN — ONDANSETRON 4 MG: 2 INJECTION INTRAMUSCULAR; INTRAVENOUS at 16:42

## 2021-02-26 RX ADMIN — INSULIN LISPRO 4 UNITS: 100 INJECTION, SOLUTION INTRAVENOUS; SUBCUTANEOUS at 03:35

## 2021-02-26 RX ADMIN — HYDROMORPHONE HYDROCHLORIDE 0.5 MG: 1 INJECTION, SOLUTION INTRAMUSCULAR; INTRAVENOUS; SUBCUTANEOUS at 23:39

## 2021-02-26 RX ADMIN — LABETALOL HYDROCHLORIDE 30 MG: 5 INJECTION INTRAVENOUS at 00:38

## 2021-02-26 RX ADMIN — LIDOCAINE HYDROCHLORIDE 60 MG: 20 INJECTION, SOLUTION EPIDURAL; INFILTRATION; INTRACAUDAL; PERINEURAL at 16:41

## 2021-02-26 RX ADMIN — MORPHINE SULFATE 1 MG: 2 INJECTION, SOLUTION INTRAMUSCULAR; INTRAVENOUS at 03:30

## 2021-02-26 RX ADMIN — TIMOLOL MALEATE 1 DROP: 5 SOLUTION/ DROPS OPHTHALMIC at 20:43

## 2021-02-26 RX ADMIN — SODIUM CHLORIDE, PRESERVATIVE FREE 10 ML: 5 INJECTION INTRAVENOUS at 08:28

## 2021-02-26 RX ADMIN — Medication 2 G: at 16:30

## 2021-02-26 RX ADMIN — MORPHINE SULFATE 1 MG: 2 INJECTION, SOLUTION INTRAMUSCULAR; INTRAVENOUS at 08:27

## 2021-02-26 RX ADMIN — SODIUM CHLORIDE: 9 INJECTION, SOLUTION INTRAVENOUS at 17:14

## 2021-02-26 RX ADMIN — LABETALOL HYDROCHLORIDE 30 MG: 5 INJECTION INTRAVENOUS at 06:50

## 2021-02-26 RX ADMIN — SUGAMMADEX 200 MG: 100 INJECTION, SOLUTION INTRAVENOUS at 17:31

## 2021-02-26 RX ADMIN — HYDROMORPHONE HYDROCHLORIDE 0.5 MG: 1 INJECTION, SOLUTION INTRAMUSCULAR; INTRAVENOUS; SUBCUTANEOUS at 18:16

## 2021-02-26 RX ADMIN — LATANOPROST 1 DROP: 50 SOLUTION OPHTHALMIC at 20:43

## 2021-02-26 RX ADMIN — PHENYLEPHRINE HYDROCHLORIDE 100 MCG: 10 INJECTION INTRAVENOUS at 16:54

## 2021-02-26 RX ADMIN — BRIMONIDINE TARTRATE 1 DROP: 2 SOLUTION OPHTHALMIC at 08:29

## 2021-02-26 RX ADMIN — Medication 3 MG: at 17:16

## 2021-02-26 RX ADMIN — INSULIN LISPRO 2 UNITS: 100 INJECTION, SOLUTION INTRAVENOUS; SUBCUTANEOUS at 22:26

## 2021-02-26 RX ADMIN — Medication 0.6 MG: at 17:16

## 2021-02-26 RX ADMIN — FENTANYL CITRATE 100 MCG: 50 INJECTION, SOLUTION INTRAMUSCULAR; INTRAVENOUS at 16:41

## 2021-02-26 RX ADMIN — LABETALOL HYDROCHLORIDE 30 MG: 5 INJECTION INTRAVENOUS at 18:55

## 2021-02-26 RX ADMIN — HYDRALAZINE HYDROCHLORIDE 20 MG: 20 INJECTION INTRAMUSCULAR; INTRAVENOUS at 22:26

## 2021-02-26 RX ADMIN — DEXTROSE MONOHYDRATE: 100 INJECTION, SOLUTION INTRAVENOUS at 20:43

## 2021-02-26 RX ADMIN — BRIMONIDINE TARTRATE 1 DROP: 2 SOLUTION OPHTHALMIC at 18:53

## 2021-02-26 RX ADMIN — SODIUM CHLORIDE: 9 INJECTION, SOLUTION INTRAVENOUS at 16:30

## 2021-02-26 RX ADMIN — LABETALOL HYDROCHLORIDE 30 MG: 5 INJECTION INTRAVENOUS at 23:45

## 2021-02-26 RX ADMIN — PROPOFOL 160 MG: 10 INJECTION, EMULSION INTRAVENOUS at 16:41

## 2021-02-26 RX ADMIN — SODIUM CHLORIDE, PRESERVATIVE FREE 10 ML: 5 INJECTION INTRAVENOUS at 20:43

## 2021-02-26 ASSESSMENT — PULMONARY FUNCTION TESTS
PIF_VALUE: 0
PIF_VALUE: 12
PIF_VALUE: 0
PIF_VALUE: 1
PIF_VALUE: 20
PIF_VALUE: 5
PIF_VALUE: 0
PIF_VALUE: 12
PIF_VALUE: 14
PIF_VALUE: 20
PIF_VALUE: 20
PIF_VALUE: 3
PIF_VALUE: 27
PIF_VALUE: 20
PIF_VALUE: 15
PIF_VALUE: 13
PIF_VALUE: 16
PIF_VALUE: 22
PIF_VALUE: 0
PIF_VALUE: 23
PIF_VALUE: 1
PIF_VALUE: 20
PIF_VALUE: 21
PIF_VALUE: 30
PIF_VALUE: 13
PIF_VALUE: 12
PIF_VALUE: 5
PIF_VALUE: 21
PIF_VALUE: 13
PIF_VALUE: 21
PIF_VALUE: 21
PIF_VALUE: 12

## 2021-02-26 ASSESSMENT — PAIN - FUNCTIONAL ASSESSMENT: PAIN_FUNCTIONAL_ASSESSMENT: PREVENTS OR INTERFERES SOME ACTIVE ACTIVITIES AND ADLS

## 2021-02-26 ASSESSMENT — PAIN SCALES - GENERAL
PAINLEVEL_OUTOF10: 9
PAINLEVEL_OUTOF10: 0
PAINLEVEL_OUTOF10: 7
PAINLEVEL_OUTOF10: 7

## 2021-02-26 ASSESSMENT — ENCOUNTER SYMPTOMS: SHORTNESS OF BREATH: 0

## 2021-02-26 ASSESSMENT — PAIN DESCRIPTION - PAIN TYPE: TYPE: ACUTE PAIN

## 2021-02-26 ASSESSMENT — PAIN DESCRIPTION - PROGRESSION: CLINICAL_PROGRESSION: NOT CHANGED

## 2021-02-26 NOTE — ANESTHESIA PRE PROCEDURE
Place 1 drop into the left eye 2 times daily. Historical Provider, MD   ferrous sulfate 325 (65 FE) MG tablet Take 1 tablet by mouth 2 times daily (with meals). 5/28/13   Kylie Nagel MD   ipratropium-albuterol (DUONEB) 0.5-2.5 (3) MG/3ML SOLN nebulizer solution Inhale 3 mLs into the lungs every 6 hours as needed for Shortness of Breath. 5/28/13   Kylie Nagel MD   isosorbide mononitrate (IMDUR) 30 MG CR tablet Take 1 tablet by mouth daily. 5/28/13   Kylie Nagel MD   simvastatin (ZOCOR) 10 MG tablet Take 10 mg by mouth daily. Historical Provider, MD   tamsulosin (FLOMAX) 0.4 MG capsule Take 0.4 mg by mouth every evening. Historical Provider, MD   minoxidil (LONITEN) 10 MG tablet Take 50 mg by mouth daily. Historical Provider, MD   travoprost, benzalkonium, (TRAVATAN) 0.004 % ophthalmic solution Place 1 drop into the left eye nightly. Historical Provider, MD   nitroGLYCERIN (NITROSTAT) 0.4 MG SL tablet Place 0.4 mg under the tongue every 5 minutes as needed. Historical Provider, MD   docusate sodium (COLACE) 100 MG capsule Take 100 mg by mouth daily as needed. Indications: Constipation    Historical Provider, MD       Current medications:    No current facility-administered medications for this visit. No current outpatient medications on file.      Facility-Administered Medications Ordered in Other Visits   Medication Dose Route Frequency Provider Last Rate Last Admin    lidocaine PF 1 % injection 5 mL  5 mL Intradermal Once Miriam Jensen MD        sodium chloride flush 0.9 % injection 10 mL  10 mL Intravenous PRN Miriam Jensen MD        heparin flush 100 UNIT/ML injection 300 Units  3 mL Intravenous 2 times per day Miriam Jensen MD        heparin flush 100 UNIT/ML injection 300 Units  3 mL Intracatheter PRN Miriam Jensen MD        PN-Adult  3-in-1 Central Line (Standard)   Intravenous Continuous TPN Miriam Jensen MD        PN-Adult 3-in-1 Peripheral Line Intravenous Continuous TPN Delmi Sutherland MD 75 mL/hr at 02/26/21 0221 Rate Verify at 02/26/21 0221    aspirin chewable tablet 81 mg  81 mg Oral Daily Gloannabelle Durham DO   Stopped at 02/25/21 1556    hydrALAZINE (APRESOLINE) injection 20 mg  20 mg Intravenous Q6H PRN Juan Brooke MD   20 mg at 02/25/21 2019    lansoprazole suspension SUSP 30 mg  30 mg Per NG tube Daily Anson Burns MD        labetalol (NORMODYNE;TRANDATE) injection 30 mg  30 mg Intravenous Q6H Yamileth Patel DO   30 mg at 02/26/21 1236    morphine (PF) injection 1 mg  1 mg Intravenous Q4H PRN Enmanuel Nguyễn MD   1 mg at 02/26/21 0827    ceFAZolin (ANCEF) 2000 mg in sterile water 20 mL IV syringe  2,000 mg Intravenous See Admin Instructions Anson Burns MD        atorvastatin (LIPITOR) tablet 10 mg  10 mg Oral Daily Enmanuel Nguyễn MD   Stopped at 02/25/21 0915    phenol 1.4 % mouth spray 1 spray  1 spray Mouth/Throat Q2H PRN Enmanuel Nguyễn MD   1 spray at 02/24/21 0437    trimethobenzamide (TIGAN) injection 200 mg  200 mg Intramuscular Q6H PRN Enmanuel Nguyễn MD   200 mg at 02/22/21 2114    brimonidine (ALPHAGAN) 0.2 % ophthalmic solution 1 drop  1 drop Left Eye BID Janette Hidalgo MD   1 drop at 02/26/21 0829    ipratropium-albuterol (DUONEB) nebulizer solution 3 mL  3 mL Inhalation Q6H PRN Janette Hidalgo MD        latanoprost (XALATAN) 0.005 % ophthalmic solution 1 drop  1 drop Left Eye Nightly Janette Hidalgo MD   1 drop at 02/25/21 2018    nitroGLYCERIN (NITROSTAT) SL tablet 0.4 mg  0.4 mg Sublingual Q5 Min PRN Janette Hidalgo MD        timolol (TIMOPTIC) 0.5 % ophthalmic solution 1 drop  1 drop Left Eye BID Janette Hidalgo MD   1 drop at 02/26/21 0829    sodium chloride flush 0.9 % injection 10 mL  10 mL Intravenous 2 times per day Kelleen Lennox, MD   10 mL at 02/26/21 0828    sodium chloride flush 0.9 % injection 10 mL  10 mL Intravenous PRN Janette Hidalgo MD   10 mL at 02/23/21 1527    enoxaparin (LOVENOX) injection 30 mg  30 mg Subcutaneous Daily Janette Hidalgo MD   Stopped at 02/25/21 0914    polyethylene glycol (GLYCOLAX) packet 17 g  17 g Oral Daily PRN Janette Hidalgo MD        acetaminophen (TYLENOL) tablet 650 mg  650 mg Oral Q6H PRN Janette Hidalgo MD   650 mg at 02/21/21 1539    Or    acetaminophen (TYLENOL) suppository 650 mg  650 mg Rectal Q6H PRN Janette Hidalgo MD        glucose (GLUTOSE) 40 % oral gel 15 g  15 g Oral PRN Janette Hidalgo MD        dextrose 50 % IV solution  12.5 g Intravenous PRN Janette Hidalgo MD        glucagon (rDNA) injection 1 mg  1 mg Intramuscular PRN Janette Hidalgo MD        dextrose 5 % solution  100 mL/hr Intravenous PRN Janette Hidalgo MD        insulin lispro (HUMALOG) injection vial 0-12 Units  0-12 Units Subcutaneous Q4H Janette Hidalgo MD   2 Units at 02/26/21 0650    influenza A&B vaccine (FLUAD QUADRIVALENT) injection 0.5 mL  0.5 mL Intramuscular Prior to discharge Yana West MD           Allergies:     Allergies   Allergen Reactions    Aldactone [Spironolactone] Other (See Comments)     Hyperkalemia--4/16/15       Problem List:    Patient Active Problem List   Diagnosis Code    COPD exacerbation ([facility] J44.1    Chest pain R07.9    Hypertension I10    Coronary artery disease I25.10    Preoperative cardiovascular examination Z01.810    Diabetes mellitus type 2, controlled ([facility] E11.9    Hyperlipidemia E78.5    BPH (benign prostatic hyperplasia) N40.0    Hyperkalemia E87.5    Blind right eye H54.40    CKD (chronic kidney disease) stage 3, GFR 30-59 ml/min N18.30    CAD (coronary artery disease) I25.10    CHF (congestive heart failure) ([facility]) I50.9    Chronic respiratory failure ([facility]) J96.10    EMELY (acute kidney injury) ([facility]) N17.9    SBO (small bowel obstruction) ([facility] K56.609    Cerebrovascular accident (CVA) ([facility] I63.9       Past Medical History:        Diagnosis Date    Blind right eye     CAD (coronary artery disease)     1994 CABG x 4    CHF (congestive heart failure) ([facility]     Chronic respiratory failure ([facility] 9/22/2014    CKD (chronic kidney disease) stage 3, GFR 30-59 ml/min ([facility])     Diabetes mellitus ([facility]     Diabetes mellitus type 2, controlled ([facility] 5/23/2013    HLD (hyperlipidemia)     Hypertension     TIA (transient ischemic attack)        Past Surgical History:        Procedure Laterality Date    CARDIAC SURGERY      CORONARY ARTERY BYPASS GRAFT  1994    x4    ECHO COMPL W DOP COLOR FLOW  5/23/2013         ECHO COMPL W DOP COLOR FLOW  5/23/2013         EYE SURGERY         Social History:    Social History     Tobacco Use    Smoking status: Former Smoker     Years: 2.00    Smokeless tobacco: Former User     Quit date: 8/12/1984   Substance Use Topics    Alcohol use: No                                Counseling given: Not Answered      Vital Signs (Current): There were no vitals filed for this visit.                                            BP Readings from Last 3 Encounters:   02/26/21 (!) 170/80   12/31/20 (!) 178/80       NPO Status:                                                                                 BMI:   Wt Readings from Last 3 Encounters:   02/25/21 196 lb 3.2 oz (89 kg)   12/30/20 205 lb (93 kg)     There is no height or weight on file to calculate BMI.    CBC:   Lab Results   Component Value Date    WBC 9.2 02/25/2021    RBC 4.27 02/25/2021    HGB 13.6 02/25/2021    HCT 41.4 02/25/2021    MCV 97.0 02/25/2021    RDW 14.1 02/25/2021     02/25/2021       CMP:   Lab Results   Component Value Date     02/26/2021    K 4.1 02/26/2021    K 4.2 02/21/2021     02/26/2021    CO2 31 02/26/2021    BUN 20 02/26/2021    CREATININE 1.3 02/26/2021    GFRAA >60 02/26/2021    LABGLOM >60 02/26/2021    GLUCOSE 252 02/26/2021    PROT 7.2 02/20/2021    CALCIUM 9.0 02/26/2021    BILITOT 0.7 02/20/2021    ALKPHOS 56 02/20/2021    AST 24 02/20/2021    ALT 19 02/20/2021       POC Tests: No results for input(s): POCGLU, POCNA, POCK, POCCL, seen and examined, chart reviewed (including anesthesia, drug and allergy history). Anesthetic plan, risks, benefits, alternatives, and personnel involved discussed with patient. Patient verbalized an understanding and agrees to proceed. Plan discussed with care team members and agreed upon.     Dorys Stewart MD  Staff Anesthesiologist  2:35 PM

## 2021-02-26 NOTE — PROGRESS NOTES
Aware of consult for PICC placement, pt in surgery at this time. Will assess for placement tomorrow, charge nurse made aware of above.

## 2021-02-26 NOTE — OP NOTE
Operative Note      Patient: Jackelin Baker  YOB: 1940  MRN: [id number]    Date of Procedure: 2/26/2021    Pre-Op Diagnosis: Small bowel obstruction    Post-Op Diagnosis: Same       Procedure(s):  exploratory laparotomy lysis of adhesions    Surgeon(s):  Checo St MD    Assistant:   Resident: Tegan Wen MD    Anesthesia: General    Estimated Blood Loss (mL): Minimal    Complications: None    Specimens:   * No specimens in log *    Implants:  * No implants in log *      Drains:   NG/OG/NJ/NE Tube Nasoenteric decompression tube Left nostril (Active)   Surrounding Skin Dry; Intact 02/26/21 0349   Securement device Yes 02/26/21 0349   Status Suction-low intermittent 02/26/21 0349   Placement Verified by X-Ray (Initial) 02/26/21 0349   NG/OG/NJ/NE External Measurement (cm) 60 cm 02/26/21 0349   Drainage Appearance Brown 02/26/21 0349   Output (mL) 850 ml 02/26/21 0349       Urethral Catheter Coude 16 fr (Active)   Catheter Indications Acute urinary retention/obstruction 02/26/21 0815   Site Assessment No urethral drainage 02/26/21 0815   Urine Color Zuri 02/26/21 0815   Urine Appearance Clear 02/26/21 0815   Output (mL) 900 mL 02/26/21 0349       Findings: Adhesions    Detailed Description of Procedure: The patient was prepped and draped in the usual fashion. A timeout was performed. A midline incision was made sharply and carried down to the fascia with electrocautery. The abdomen was entered and the small bowel eviscerated. In the right lower quadrant a transition point was noted where two thick bands were identified and transected. The small bowel anastomosis previously performed was noted to have been done with permanent suture and was a side-to-side functional handsewn anastomosis. The 2 areas adjacent to bowel where there had been adhesive bands were oversewn with 3-0 Vicryl suture. At this point the entire small bowel was able to be run with no further obstructing adhesions.   All the bowel appeared viable. The fascia was closed with 0- loop PDS suture and the skin was closed with staples. The patient tolerated the procedure well and was extubated and sent to PACU in stable condition.     Dr. Miguel Ángel Guerra was present for the entire procedure    Electronically signed by Moon Coe MD on 2/26/2021 at 5:21 PM

## 2021-02-26 NOTE — PROGRESS NOTES
P Quality Flow/Interdisciplinary Rounds Progress Note        Quality Flow Rounds held on February 26, 2021    Disciplines Attending:  Bedside Nurse, ,  and Nursing Unit Leadership    Eunice Bradshaw was admitted on 2/20/2021  5:15 PM    Anticipated Discharge Date:  Expected Discharge Date: 03/02/21    Disposition:    Justo Score:  Justo Scale Score: 19    Readmission Risk              Risk of Unplanned Readmission:        22           Discussed patient goal for the day, patient clinical progression, and barriers to discharge. The following Goal(s) of the Day/Commitment(s) have been identified:  Surgery and PICC line placement today.  Start TPN    Morgan Lopez  February 26, 2021

## 2021-02-26 NOTE — PROGRESS NOTES
Progress note:    Unfortunately, the patient has had quite a prolonged course. Presently, the patient is receiving nutrition through the IV. The patient has received clearance from cardiology. On examination: The abdomen is soft but distended. The abdomen is nontender. Assessment:    Small bowel obstruction    Plan:    Diagnostic laparoscopy with laparotomy and possible small and/or large bowel resection tomorrow.

## 2021-02-26 NOTE — PLAN OF CARE
Problem: Inadequate oral food/beverage intake (NI-2.1)  Goal: Food and/or Nutrient Delivery  Continue NPO. Recommend to Modify Current PN Order to meet needs more appropriately. Description: Individualized approach for food/nutrient provision.   Outcome: Met This Shift

## 2021-02-26 NOTE — PROGRESS NOTES
[address] [facility] Hospitalist   Progress Note    Admitting Date and Time: 2/20/2021  5:15 PM  Admit Dx: Intestinal obstruction (HCC) [K56.609]    Subjective:    Pt sitting up in bed in no acute distress. Pt is relieved that he will have procedure some time today. Pt is very pleased with Surgeon on case. NGT to LIS continues. TPN on hold. For PICC line today. Per RN: For surgery this afternoon. For PICC line today. TPN on hold. ROS: denies fever, chills, cp, sob, n/v, HA unless stated above.      lidocaine PF  5 mL Intradermal Once    heparin flush  3 mL Intravenous 2 times per day    aspirin  81 mg Oral Daily    lansoprazole  30 mg Per NG tube Daily    labetalol  30 mg Intravenous Q6H    ceFAZolin  2,000 mg Intravenous See Admin Instructions    atorvastatin  10 mg Oral Daily    brimonidine  1 drop Left Eye BID    latanoprost  1 drop Left Eye Nightly    timolol  1 drop Left Eye BID    sodium chloride flush  10 mL Intravenous 2 times per day    enoxaparin  30 mg Subcutaneous Daily    insulin lispro  0-12 Units Subcutaneous Q4H    influenza virus vaccine  0.5 mL Intramuscular Prior to discharge         sodium chloride flush, 10 mL, PRN      heparin flush, 3 mL, PRN      hydrALAZINE, 20 mg, Q6H PRN      morphine, 1 mg, Q4H PRN      phenol, 1 spray, Q2H PRN      trimethobenzamide, 200 mg, Q6H PRN      ipratropium-albuterol, 3 mL, Q6H PRN      nitroGLYCERIN, 0.4 mg, Q5 Min PRN      sodium chloride flush, 10 mL, PRN      polyethylene glycol, 17 g, Daily PRN      acetaminophen, 650 mg, Q6H PRN    Or      acetaminophen, 650 mg, Q6H PRN      glucose, 15 g, PRN      dextrose, 12.5 g, PRN      glucagon (rDNA), 1 mg, PRN      dextrose, 100 mL/hr, PRN         Objective:    BP (!) 182/78   Pulse 89   Temp 98.6 °F (37 °C) (Oral)   Resp 22   Ht 5' 11\" (1.803 m)   Wt 196 lb 3.2 oz (89 kg)   SpO2 94%   BMI 27.36 kg/m²   General Appearance: alert and oriented to person, place and time and in no acute distress  Skin: warm and dry  Head: normocephalic and atraumatic. Left Nares NGT  Eyes: pupils equal, round, and reactive to light, extraocular eye movements intact, conjunctivae normal  Neck: neck supple and non tender without mass   Pulmonary/Chest: clear to auscultation bilaterally- no wheezes, rales or rhonchi, normal air movement, no respiratory distress  Cardiovascular: normal rate, normal S1 and S2 and no no rubs, gallops, murmur noted. Abdomen: soft, non-tender, non-distended, normal bowel sounds, no rebound guarding rigidity noted. Extremities: no cyanosis, no clubbing and no edema  Neurologic: no cranial nerve deficit and speech normal      Recent Labs     02/24/21 2000 02/25/21  0230 02/26/21  0345   * 151* 156*   K 4.2 3.7 4.1   * 118* 122*   CO2 28 27 31*   BUN 35* 29* 20   CREATININE 1.3* 1.3* 1.3*   GLUCOSE 273* 255* 252*   CALCIUM 8.5* 8.5* 9.0       No results for input(s): ALKPHOS, PROT, LABALBU, BILITOT, AST, ALT in the last 72 hours. Recent Labs     02/25/21  1012   WBC 9.2   RBC 4.27   HGB 13.6   HCT 41.4   MCV 97.0   MCH 31.9   MCHC 32.9   RDW 14.1      MPV 10.4            Radiology:   US CAROTID ARTERY BILATERAL   Final Result   The right internal carotid artery demonstrates 50-69% stenosis. The left internal carotid artery demonstrates 70-99% stenosis. Bilateral vertebral arteries are patent with flow in the normal direction. NM Cardiac Stress Test Nuclear Imaging   Final Result   The myocardial perfusion imaging demonstrates a fixed inferolateral   defect with corresponding wall motion abnormality consistent with a   prior myocardial infarction. No reversible ischemia is seen. The calculated ejection fraction 64%. However, visually the left   ventricle appears to be severely dilated with moderate global   hypokinesis and severe hypokinesis of the inferolateral wall. Consider   an echocardiogram if clinically indicated.       MRI BRAIN W WO CONTRAST   Final Result   1. Questionable punctate 4 mm diameter focus of acute ischemia involving the   mid left frontal lobe deep white matter. 2. Moderate cerebral white matter chronic microvascular ischemic disease. 3. Mild diffuse cerebral atrophy. CT HEAD WO CONTRAST   Final Result   1. There is no acute intracranial abnormality. Specifically, there is no   intracranial hemorrhage. 2. Atrophy and periventricular leukomalacia,   3. Given the symptoms of visual loss an asses of any infarct within the   occipital lobes or suprasellar mass dedicated MRI of the brain is recommended   for further evaluation. XR ABDOMEN FOR NG/OG/NE TUBE PLACEMENT   Final Result   Satisfactory position of nasogastric tube following advancement. XR ABDOMEN (KUB) (SINGLE AP VIEW)   Final Result   The tip of the NG tube is at the level of the diaphragm. Please advance the   NG tube by an additional 10 cm   Multiple dilated loops of small bowel consistent with small bowel obstruction. CT ABDOMEN PELVIS WO CONTRAST Additional Contrast? Oral   Final Result   Persistent high-grade small-bowel obstruction with significantly dilated   proximal small bowel loops with the transition point in the right lower   quadrant with the swirling appearance in the mesentery. Mesenteric or   midgrade volvulus or adhesions/stricture is considered. Decompression is   recommended. XR ABDOMEN FOR NG/OG/NE TUBE PLACEMENT   Final Result   Satisfactory position of enteric tube. CT ABDOMEN PELVIS WO CONTRAST Additional Contrast? None   Final Result   Dilatation of multiple segments of small bowel throughout the abdomen and   pelvis with air-fluid levels consistent with small bowel obstruction. 2 areas of transition are seen in the right mid abdomen. A transition is seen at a site of rotatory configuration of small bowel and   mesenteric vessels with narrowing of small bowel suggestive of small bowel   volvulus. Follow-up with oral contrast may be helpful in further evaluation. Enlarged prostate. Prominent atherosclerotic peripheral vascular disease. XR CHEST PORTABLE   Final Result   Mild cardiomegaly status post median sternotomy. No acute infiltrates or   failure. Assessment:  Active Problems:    Preoperative cardiovascular examination    SBO (small bowel obstruction) ([address]    Cerebrovascular accident (CVA) ([address]  Resolved Problems:    * No resolved hospital problems. *      Plan:  1. [id number] CT abdomen pelvis with significantly dilated fluid-filled proximal small bowel loops measuring up to 4.2 cm. Distal small bowel loops and colon collapsed. Narrowing of small bowel loops in the right lower quadrant with a twisted appearance. NPO. NGT for decompression. TPN on hold. Scheduled for exploratory lap [facility]  General surgery input appreciated. 2. Hypernatremia- Na+ 156. TPN  Na+ Adjustment. Continue to follow closely. Nephrology input appreciated  3. Hypophosphatemia- Phos 1.3. Nephrology following. 4. Hypokalemia- K+4.1. Continue to follow closely replete as needed. 5. EMELY superimposed on CKD stage IIIa-BUN/Crt 20/1.3. Avoid nephrotoxic agents. Follow closely. 6. Hx CAD-denies CP.  1/1/1994 CABG x4. Continue BB/ASA/statin therapy. 7. DM-A1c 7.6  Glucose 158. Continue SSI/hypoglycemic protocol/POCGT. Follow Adjust as needed. 8.  HTN-significant hypertension noted. Labetalol 30 mg IV every 6 hours initiated. Follow closely adjust as needed. 9. ?Left Frontal Lobe ischemia-Acute-subacute ischemic stroke left middle cerebral artery territory likely lacunar infarction due to small vessel disease. Continue ASA/statin therapy. Carotid US Right internal artery 50-69%. Left internal Artery 70-99%. Bilateral vertebral arteries are patent with flow in normal direction. If Delay in carotid intervention, Plavix will need added to ASA therapy. Per Neurology.  Neurology/vascular Surgery input appreciated. 10. Left eye vision loss-glaucoma specialist in [facility], [facility]. for follow-up as outpatient. Likely loss of vision 2/2 vascular disease per ophthalmology. 11. Bilateral Carotid Stenosis- Carotid US Right internal artery 50-69%. Left internal Artery 70-99%. Bilateral vertebral arteries are patent with flow in normal direction. Vascular surgery input appreciated. NOTE: This report was transcribed using voice recognition software. Every effort was made to ensure accuracy; however, inadvertent computerized transcription errors may be present. Electronically signed by Amando Thomas CNP on 2/26/2021 at 9:18 AM    HOSPITALIST ATTENDING PHYSICIAN NOTE 2/26/2021 2005PM:    Details of the evaluation - subjective assessment (including medication profile, past medical, family and social history when applicable), examination, review of lab and test data, diagnostic impressions and medical decision making - performed by Nina Canavan. Darwin Bohr - CNP, were discussed with me on the date of service and I agree with clinical information herein unless otherwise noted. The patient has been evaluated by me personally earlier today. Pt reports no fevers, chills,n/v.     Exam: heart reg at rate of 92,lungs cta, abd pos bs soft nt, ext neg for le edema    I agree with the assessment and plan of Nina Canavan. Darwin Bohr - CNP    sbo  Hypernatremia  cva likely lacunar infarction  itz  Dm type 2 controlled  htn  Left eye vision loss  hypophosphatemia  hypokalemia    Nephrology following. Pt for surgery today    Electronically signed by Octavio Tony D.O.   Hospitalist  [facility]ist [facility] at [facility]

## 2021-02-26 NOTE — PROGRESS NOTES
SpO2 94%   BMI 27.36 kg/m²   Physical Exam:  Alert and attentive. Language appropriate, with no aphasia. EOMI and does not make eye contact. Facial strength symmetric. Normal tone and strength. 24 hour intake/output:    Intake/Output Summary (Last 24 hours) at 2/26/2021 1123  Last data filed at 2/26/2021 0349  Gross per 24 hour   Intake --   Output 2800 ml   Net -2800 ml     Last 3 weights: Wt Readings from Last 3 Encounters:   02/25/21 196 lb 3.2 oz (89 kg)   12/30/20 205 lb (93 kg)         CBC:   Recent Labs     02/25/21  1012   WBC 9.2   HGB 13.6        BMP:    Recent Labs     02/24/21  2000 02/25/21  0230 02/26/21  0345   * 151* 156*   K 4.2 3.7 4.1   * 118* 122*   CO2 28 27 31*   BUN 35* 29* 20   CREATININE 1.3* 1.3* 1.3*   GLUCOSE 273* 255* 252*     Calcium:  Recent Labs     02/26/21  0345   CALCIUM 9.0     Magnesium:  Recent Labs     02/26/21  0345   MG 2.5     Glucose:No results for input(s): POCGLU in the last 72 hours. HgbA1C:   Recent Labs     02/26/21  0345   LABA1C 7.6*     Lipids:   Recent Labs     02/26/21  0345   CHOL 120   TRIG 106   HDL 48   LDLCALC 51     Sed rate 15      Radiology   Carotid ultrasound showed 50 to 69% stenosis on the right and on the symptomatic left side, 70 to 99% stenosis                Assessment:    Active Problems:    Preoperative cardiovascular examination    SBO (small bowel obstruction) ([address]    Cerebrovascular accident (CVA) ([address]  Resolved Problems:    * No resolved hospital problems. *    1)  tiny, acute, ischemic stroke, in the left frontal lobe  2)  vision loss left eye, either ischemic optic neuropathy or central retinal artery occlusion  3)  symptomatic left carotid stenosis    Plan:  I am taking the liberty of asking vascular surgery to see the patient. He should have an endarterectomy on the left. Confirmation of degree of stenosis might be desired with a CT angiogram, but I will defer that to a vascular surgeon.   If carotid surgery will be deferred he needs to be on Plavix with the aspirin, but with 2 possible recent events in that carotid territory, I would like to see surgery as soon as possible.       Electronically signed by Travis Garcia DO on 2/26/2021 at 11:23 AM    Neurology

## 2021-02-26 NOTE — PROGRESS NOTES
Patient to be undergo diagnostic laparoscopy. I do not recommend further cardiac testing or intervention prior to surgery.     Zaki Garcia MD

## 2021-02-26 NOTE — PROGRESS NOTES
Comprehensive Nutrition Assessment    Type and Reason for Visit:  Reassess, Consult(PN Rec's)    Nutrition Recommendations/Plan: Continue NPO. Recommend to Modify Current PN Order to meet needs more appropriately. PN Recommendations:  Adult PN 3-in-1 Central Standard @ 80 ml/hr (Goal) Continuous x 24hrs/d to provide 1920 ml TV, 1987 kcals, 96 gm AA.     Pt at high risk for Re-Feeding Syndrome d/t minimal intake x ~11d now total (including stated poor intake pta) - Highly recommend to start at low rate and increase slowly as tolerated to goal w/ close monitoring of BGL/Lytes labs and adjust/treat as needed. Nutrition Assessment:  Pt declining from a nutritional stand-point AEB pt continues w/ no intake x ~11d now total (including ~5d pta) d/t poor appetite w/ n/v/c, abd pain and NPO since adm 2/2 SBO. PICC placement and SBR scheduled for today per notes. Will provide TPN recommendations to meet estimated needs d/t continued poor GI function since adm. Malnutrition Assessment:  Malnutrition Status:  Insufficient data    Context:  Acute Illness     Findings of the 6 clinical characteristics of malnutrition:  Energy Intake:  7 - 50% or less of estimated energy requirements for 5 or more days  Weight Loss:  Unable to assess(2/2 poor EMR wt hx w/ no actual wt's pta and CHF/CKD hx w/ suspected fluid shifts)     Body Fat Loss:  Unable to assess     Muscle Mass Loss:  Unable to assess    Fluid Accumulation:  No significant fluid accumulation     Strength:  Not Performed    Estimated Daily Nutrient Needs:  Energy (kcal):  3332-7642(MSJ x 1.2 SF per CBW); Weight Used for Energy Requirements:  Current     Protein (g):  (1.2-1.4 gm/kg per IBW as tolerated w/ EMELY/CKD);  Weight Used for Protein Requirements:  Ideal        Fluid (ml/day):  3135-4169; Method Used for Fluid Requirements:  1 ml/kcal      Nutrition Related Findings:  A&O, poor dentition, tender/distended Abd, Hypo BS, +NGT, No Edema, -I/O's(pend plan for PICC and SBR 2/26)      Wounds:  None       Current Nutrition Therapies:    Current Parenteral Nutrition Orders:  · Type and Formula: 3-in-1 Peripheral   · Lipids: None  · Duration: Continuous  · Rate/Volume: 75 ml/hr (Goal) = 1800 ml TV  · Current PN Order Provides: Same as Goal  · Goal PN Orders Provides: 1800 ml TV, 1638 kcals, 77 gm AA      Anthropometric Measures:  · Height: 5' 11\" (180.3 cm)  · Current Body Weight: 196 lb (88.9 kg)(Stand 2/25)   · Admission Body Weight: 196 lb (88.9 kg)(stand 2/25)    · Usual Body Weight: 205 lb (93 kg)(unknown method 12/30/20 per EMR hx; DARION wt loss properly at this time d/t poor EMR wt hx w/ no actual wt's)     · Ideal Body Weight: 172 lbs; % Ideal Body Weight     · BMI: 27.3  · Adjusted Body Weight:  ; No Adjustment   · Adjusted BMI:      · BMI Categories: Overweight (BMI 25.0-29. 9)       Nutrition Diagnosis:   · Inadequate oral intake related to altered GI function(2/2 SBO) as evidenced by NPO or clear liquid status due to medical condition, nutrition support - parenteral nutrition, poor intake prior to admission, weight loss, GI abnormality, nausea, vomiting, constipation      Nutrition Interventions:   Food and/or Nutrient Delivery:  Continue NPO, Modify Parenteral Nutrition(Continue NPO. Recommend to Modify Current PN Order to meet needs more appropriately.)  Nutrition Education/Counseling:  No recommendation at this time   Coordination of Nutrition Care:  Continue to monitor while inpatient    Goals:  Pt to tolerate PN at goal.       Nutrition Monitoring and Evaluation:   Behavioral-Environmental Outcomes:  None Identified   Food/Nutrient Intake Outcomes:  Diet Advancement/Tolerance, Parenteral Nutrition Intake/Tolerance  Physical Signs/Symptoms Outcomes:  Biochemical Data, GI Status, Nausea or Vomiting, Fluid Status or Edema, Nutrition Focused Physical Findings, Skin, Weight     Discharge Planning:     Too soon to determine     Electronically signed by Mannie Saldivar RD, LD on 2/26/21 at 10:41 AM EST    Contact: ext [phone]

## 2021-02-26 NOTE — CONSULTS
Vascular Surgery Consultation Note    Reason for Consult:  Symptomatic carotid stenosis    HPI :    This is a [de-identified] y.o. male who is admitted to the hospital for treatment of abdominal pain, SBO. Patient is legally blind however could see shapes out of his left eye. Approximately 3 days ago he noticed that the vision in his left eye became even worse. Neurology was consulted as there was concern for acute stroke. CT head did show acute stroke, U/S showed L carotid artery stenosis 70-99%. Patient had been seen in 2014 by vascular for carotid artery stenosis was 59-70% at that time. Patient was in pre op for diagnostic lap for SBO.     ROS : Negative if blank [], Positive if [x]  General Vascular   [] Fevers [] Claudication (Blocks)   [] Chills [] Rest Pain   [] Weight Loss [] Tissue Loss   [] Chest Pain [] Clotting Disorder    [] SOB at rest [] Leg Swelling   [] SOB with exertion [] DVT/PE      [x] Nausea    [] Vomitting [x] Stroke/TIA   [x] Abdominal Pain [] Focal weakness   [] Melena [] Slurred Speech   [] Hematochezia [x] Vision Changes   [] Hematuria    [] Dysuria [] Hx of Central Catheters   [] Wears Glasses/Contacts  [] Dialysis and If so date initiated   [x] Blindness     [] Right Hand Dominant   [] Difficulty swallowing        Past Medical History:   Diagnosis Date    Blind right eye     CAD (coronary artery disease)     1994 CABG x 4    CHF (congestive heart failure) (HCC)     Chronic respiratory failure ([facility] 9/22/2014    CKD (chronic kidney disease) stage 3, GFR 30-59 ml/min ([address]     Diabetes mellitus ([address]     Diabetes mellitus type 2, controlled ([address] 5/23/2013    HLD (hyperlipidemia)     Hypertension     TIA (transient ischemic attack)         Past Surgical History:   Procedure Laterality Date    CARDIAC SURGERY      CORONARY ARTERY BYPASS GRAFT  1994    x4    ECHO COMPL W DOP COLOR FLOW  5/23/2013         ECHO COMPL W DOP COLOR FLOW  5/23/2013         EYE SURGERY       Current Medications:    PN-Adult  3 IN 1 Central Line (Standard)      dextrose        sodium chloride flush, heparin flush, HYDROmorphone, HYDROmorphone, ondansetron, meperidine, hydrALAZINE, morphine, phenol, trimethobenzamide, ipratropium-albuterol, nitroGLYCERIN, sodium chloride flush, polyethylene glycol, acetaminophen **OR** acetaminophen, glucose, dextrose, glucagon (rDNA), dextrose    lidocaine PF  5 mL Intradermal Once    heparin flush  3 mL Intravenous 2 times per day    ceFAZolin 2000 mg in 20 mL SWFI IV Syringe        aspirin  81 mg Oral Daily    lansoprazole  30 mg Per NG tube Daily    labetalol  30 mg Intravenous Q6H    ceFAZolin  2,000 mg Intravenous See Admin Instructions    atorvastatin  10 mg Oral Daily    brimonidine  1 drop Left Eye BID    latanoprost  1 drop Left Eye Nightly    timolol  1 drop Left Eye BID    sodium chloride flush  10 mL Intravenous 2 times per day    enoxaparin  30 mg Subcutaneous Daily    insulin lispro  0-12 Units Subcutaneous Q4H    influenza virus vaccine  0.5 mL Intramuscular Prior to discharge        Allergies:  Aldactone [spironolactone]    Social History     Socioeconomic History    Marital status: Single     Spouse name: Not on file    Number of children: Not on file    Years of education: Not on file    Highest education level: Not on file   Occupational History    Not on file   Social Needs    Financial resource strain: Not on file    Food insecurity     Worry: Not on file     Inability: Not on file    Transportation needs     Medical: Not on file     Non-medical: Not on file   Tobacco Use    Smoking status: Former Smoker     Years: 2.00    Smokeless tobacco: Former User     Quit date: 8/12/1984   Substance and Sexual Activity    Alcohol use: No    Drug use: No    Sexual activity: Not on file   Lifestyle    Physical activity     Days per week: Not on file     Minutes per session: Not on file    Stress: Not on file   Relationships    Social connections     Talks on phone: Not on file     Gets together: Not on file     Attends Confucianist service: Not on file     Active member of club or organization: Not on file     Attends meetings of clubs or organizations: Not on file     Relationship status: Not on file    Intimate partner violence     Fear of current or ex partner: Not on file     Emotionally abused: Not on file     Physically abused: Not on file     Forced sexual activity: Not on file   Other Topics Concern    Not on file   Social History Narrative    Not on file        No family history on file.     PHYSICAL EXAM:    BP (!) 173/60   Pulse 94   Temp 99.3 °F (37.4 °C) (Temporal)   Resp 16   Ht 5' 11\" (1.803 m)   Wt 196 lb (88.9 kg) Comment: yesterday on bedside scale  SpO2 98%   BMI 27.34 kg/m²   CONSTITUTIONAL:  awake, alert, cooperative, no apparent distress, and appears stated age  Normal except for blind R eye, decreased vision L eye  EYES:  lids and lashes normal, sclera clear and conjunctiva normal  ENT:  normocepalic, without obvious abnormality, external ears without lesions  NECK:  supple, symmetrical, trachea midline,no jugular venous distension  HEMATOLOGIC/LYMPHATICS:  no cervical lymphadenopathy  LUNGS:  no increased work of breathing  CARDIOVASCULAR:  RR  ABDOMEN:  soft, distended, non-tender, Aorta is not palpable  SKIN:  no bruising or bleeding  EXTREMITIES:   R UE Swelling absent Incisions absent       5/5 Strength  L UE Swelling absent Incisions absent       5/5 Strength  R LE Edema absent  Incisions absent    Varicose veins absent    Wounds absent, normalcaprefill   5/5 Strength, neuropathy is absent  L LE Edema absent  Incisions absent    Varicose veins absent    Wounds absent, normalcaprefill   5/5 Strength, neuropathy is absent  R brachial  L brachial    R radial 1 L radial 1   R femoral 2 L femoral 2   R popliteal  L popliteal    R posterior tibial  L posterior tibial    R dorsalis pedis  L dorsalis pedis      LABS: Lab Results   Component Value Date    WBC 9.2 02/25/2021    HGB 13.6 02/25/2021    HCT 41.4 02/25/2021     02/25/2021    PROTIME 13.0 (H) 02/20/2021    INR 1.1 02/20/2021    K 4.1 02/26/2021    BUN 20 02/26/2021    CREATININE 1.3 (H) 02/26/2021       RADIOLOGY:    Assesment/Plan  Patient is an [de-identified] yo M who presents with symptomatic L carotid artery stenosis, SBO.    - ASA, high dose statin  - Patient needs SBO to be treated and be optimized medically  - Recommend plavix when patient is stable from general surgery stand point  - Will need CEA when more stable. - Discussed with Dr. Tona Gallegos     This patient was seen and examined. He is pleasant he is intermittently orientated. He is status post surgery. I truly cannot get a history on him whether or not he had vision changes in his right eye or left eye. Apparently he has had chronic vision loss in his right eye and his left eye but his left eye got progressively worse according to my discussion with the nursing staff and the resident. I cannot get that history from him today. General: He is awake he is alert he is oriented to self. HEENT head is normocephalic atraumatic trach is midline there is no gross jugular venous distention. Ocular muscles I cannot really get him to track my finger. There is no gross audible bruit  Cardiac: Currently sounds regular rate and rhythm no murmur rub or gallop  Pulmonary: Clear to auscultation bilaterally no wheezes rales or rhonchi  Abdomen: Soft nontender no rebound or guarding. Status post surgery incisional discomfort is noted with palpation  Extremities: Motor and sensation are intact in the upper and lower extremities palpable brachial radial pulses. Femorals are palpable. DP and PTs I cannot personally feel but there is no gross signs of vascular ischemia.   Motor and sensation are intact  Neuro: Really could not be assessed secondary to his mental status condition  Psych could not

## 2021-02-26 NOTE — DISCHARGE INSTR - COC
Continuity of Care Form    Patient Name: Neri Wilson   :  1940  MRN:  [id number]    Admit date:  2021  Discharge date:  ***    Code Status Order: Full Code   Advance Directives:   Advance Care Flowsheet Documentation     Date/Time Healthcare Directive Type of Healthcare Directive Copy in [address] Agent's Name Healthcare Agent's Phone Number    21 1028  No, patient does not have an advance directive for healthcare treatment -- -- -- -- --    21 0628  No, patient does not have an advance directive for healthcare treatment -- -- -- -- --    21 0555  No, patient does not have an advance directive for healthcare treatment -- -- -- -- --    21 2341  No, patient does not have an advance directive for healthcare treatment -- -- -- -- --          Admitting Physician:  Gordy Santillan MD  PCP: Carmel Frank DO    Discharging Nurse: [facility] Unit/Room#: 8466/3671-T  Discharging Unit Phone Number: ***    Emergency Contact:   Extended Emergency Contact Information  Primary Emergency Contact: Britney Boris Laureano Phone: [phone]  Relation: Other  Secondary Emergency Contact: [facility] Phone: [phone]  Relation: Brother/Sister   needed?  No    Past Surgical History:  Past Surgical History:   Procedure Laterality Date    CARDIAC SURGERY      CORONARY ARTERY BYPASS GRAFT  1994    x4    ECHO COMPL W DOP COLOR FLOW  2013         ECHO COMPL W DOP COLOR FLOW  2013         EYE SURGERY         Immunization History:   Immunization History   Administered Date(s) Administered    Influenza Virus Vaccine 2014    Pneumococcal Polysaccharide (Astjjijnl67) 2013       Active Problems:  Patient Active Problem List   Diagnosis Code    [facility] J44.1    Chest pain R07.9    Hypertension I10    Coronary artery disease I25.10    Preoperative cardiovascular examination Z01.810    Diabetes shifts:  In: -   Out: 2800 [Urine:1550; Emesis/NG output:1250]    Safety Concerns: At Risk for Falls    Impairments/Disabilities:      Vision and Hearing    Nutrition Therapy:  Current Nutrition Therapy:   - Oral Diet:  Low Fiber    Routes of Feeding: Oral  Liquids: Thin Liquids  Daily Fluid Restriction: no  Last Modified Barium Swallow with Video (Video Swallowing Test): not done    Treatments at the Time of Hospital Discharge:   Respiratory Treatments:   Oxygen Therapy:  is not on home oxygen therapy.   Ventilator:    - No ventilator support    Rehab Therapies: Physical Therapy  Weight Bearing Status/Restrictions: No weight bearing restirctions  Other Medical Equipment (for information only, NOT a DME order):  walker  Other Treatments: ***    Patient's personal belongings (please select all that are sent with patient):  None    RN SIGNATURE:  Electronically signed by Delroy Hamman, RN on 3/4/21 at 3:50 PM EST    CASE MANAGEMENT/SOCIAL WORK SECTION    Inpatient Status Date: 2/20/2021    Readmission Risk Assessment Score:  Readmission Risk              Risk of Unplanned Readmission:        22           Discharging to Facility/ Agency   · Name: [facility]  · Address: [address] [place] [address]  · Phone:  · Fax:    Dialysis Facility (if applicable)   · Name:  · Address:  · Dialysis Schedule:  · Phone:  · Fax:    / signature: Electronically signed by Kevin Antony RN on 3/4/2021 at 2:46 PM      PHYSICIAN SECTION    Prognosis: {Prognosis:1416224929}    Condition at Discharge: [address] Patient Condition:241822120}    Rehab Potential (if transferring to Rehab): {Prognosis:8253155316}    Recommended Labs or Other Treatments After Discharge: ***    Physician Certification: I certify the above information and transfer of Dillon Morin  is necessary for the continuing treatment of the diagnosis listed and that he requires {Admit to Appropriate Level of Care:44557} for {GREATER/LESS:905183492} 30 days.      Update Admission H&P: {CHP DME Changes in ZMNTO:966275506}    PHYSICIAN SIGNATURE:  {Esignature:302825182}

## 2021-02-26 NOTE — CARE COORDINATION
COVID negative 2/20. For OR today- dx lap,possible bowel resection/possible colostomy. NG to LIS. O2 2lNC- wears home O2 2lNC through [place]. Pt is utilizing VA benefits for this hospital admission. Pt also has Medicare Part A . Per Evert French @ [place] [phone], pt is only 60% service connected-pt would only be eligible for acute rehab under VA benefits- he is not eligible for snf w/ VA. Under Waldo Nath, [facility] following- Also accepted at [facility] pending progress. Needs PICC inserted and will start TPN.  Will follow Alvarado Norris

## 2021-02-27 LAB
ALBUMIN SERPL-MCNC: 2.8 G/DL (ref 3.5–5.2)
ALP BLD-CCNC: 58 U/L (ref 40–129)
ALT SERPL-CCNC: 12 U/L (ref 0–40)
ANION GAP SERPL CALCULATED.3IONS-SCNC: 4 MMOL/L (ref 7–16)
ANION GAP SERPL CALCULATED.3IONS-SCNC: 7 MMOL/L (ref 7–16)
AST SERPL-CCNC: 14 U/L (ref 0–39)
BASOPHILS ABSOLUTE: 0.02 E9/L (ref 0–0.2)
BASOPHILS RELATIVE PERCENT: 0.2 % (ref 0–2)
BILIRUB SERPL-MCNC: 0.7 MG/DL (ref 0–1.2)
BUN BLDV-MCNC: 20 MG/DL (ref 8–23)
BUN BLDV-MCNC: 21 MG/DL (ref 8–23)
CALCIUM SERPL-MCNC: 8.4 MG/DL (ref 8.6–10.2)
CALCIUM SERPL-MCNC: 8.4 MG/DL (ref 8.6–10.2)
CHLORIDE BLD-SCNC: 124 MMOL/L (ref 98–107)
CHLORIDE BLD-SCNC: 125 MMOL/L (ref 98–107)
CO2: 28 MMOL/L (ref 22–29)
CO2: 30 MMOL/L (ref 22–29)
CREAT SERPL-MCNC: 1.5 MG/DL (ref 0.7–1.2)
CREAT SERPL-MCNC: 1.6 MG/DL (ref 0.7–1.2)
EOSINOPHILS ABSOLUTE: 0.1 E9/L (ref 0.05–0.5)
EOSINOPHILS RELATIVE PERCENT: 1 % (ref 0–6)
GFR AFRICAN AMERICAN: 51
GFR AFRICAN AMERICAN: 54
GFR NON-AFRICAN AMERICAN: 51 ML/MIN/1.73
GFR NON-AFRICAN AMERICAN: 54 ML/MIN/1.73
GLUCOSE BLD-MCNC: 179 MG/DL (ref 74–99)
GLUCOSE BLD-MCNC: 227 MG/DL (ref 74–99)
HCT VFR BLD CALC: 39.5 % (ref 37–54)
HEMOGLOBIN: 12.7 G/DL (ref 12.5–16.5)
IMMATURE GRANULOCYTES #: 0.08 E9/L
IMMATURE GRANULOCYTES %: 0.8 % (ref 0–5)
LYMPHOCYTES ABSOLUTE: 1.31 E9/L (ref 1.5–4)
LYMPHOCYTES RELATIVE PERCENT: 13.4 % (ref 20–42)
MAGNESIUM: 2.3 MG/DL (ref 1.6–2.6)
MCH RBC QN AUTO: 32.2 PG (ref 26–35)
MCHC RBC AUTO-ENTMCNC: 32.2 % (ref 32–34.5)
MCV RBC AUTO: 100 FL (ref 80–99.9)
METER GLUCOSE: 182 MG/DL (ref 74–99)
METER GLUCOSE: 196 MG/DL (ref 74–99)
METER GLUCOSE: 196 MG/DL (ref 74–99)
METER GLUCOSE: 213 MG/DL (ref 74–99)
METER GLUCOSE: 227 MG/DL (ref 74–99)
MONOCYTES ABSOLUTE: 1.32 E9/L (ref 0.1–0.95)
MONOCYTES RELATIVE PERCENT: 13.5 % (ref 2–12)
NEUTROPHILS ABSOLUTE: 6.96 E9/L (ref 1.8–7.3)
NEUTROPHILS RELATIVE PERCENT: 71.1 % (ref 43–80)
PDW BLD-RTO: 14.5 FL (ref 11.5–15)
PHOSPHORUS: 1.8 MG/DL (ref 2.5–4.5)
PLATELET # BLD: 120 E9/L (ref 130–450)
PMV BLD AUTO: 11 FL (ref 7–12)
POTASSIUM SERPL-SCNC: 4.1 MMOL/L (ref 3.5–5)
POTASSIUM SERPL-SCNC: 4.3 MMOL/L (ref 3.5–5)
RBC # BLD: 3.95 E12/L (ref 3.8–5.8)
REASON FOR REJECTION: NORMAL
REJECTED TEST: NORMAL
SODIUM BLD-SCNC: 158 MMOL/L (ref 132–146)
SODIUM BLD-SCNC: 160 MMOL/L (ref 132–146)
TOTAL PROTEIN: 5.6 G/DL (ref 6.4–8.3)
WBC # BLD: 9.8 E9/L (ref 4.5–11.5)

## 2021-02-27 PROCEDURE — 6370000000 HC RX 637 (ALT 250 FOR IP): Performed by: INTERNAL MEDICINE

## 2021-02-27 PROCEDURE — 2060000000 HC ICU INTERMEDIATE R&B

## 2021-02-27 PROCEDURE — 97168 OT RE-EVAL EST PLAN CARE: CPT

## 2021-02-27 PROCEDURE — 2580000003 HC RX 258: Performed by: INTERNAL MEDICINE

## 2021-02-27 PROCEDURE — 2700000000 HC OXYGEN THERAPY PER DAY

## 2021-02-27 PROCEDURE — 80048 BASIC METABOLIC PNL TOTAL CA: CPT

## 2021-02-27 PROCEDURE — 82962 GLUCOSE BLOOD TEST: CPT

## 2021-02-27 PROCEDURE — 2500000003 HC RX 250 WO HCPCS: Performed by: SURGERY

## 2021-02-27 PROCEDURE — 80053 COMPREHEN METABOLIC PANEL: CPT

## 2021-02-27 PROCEDURE — 76937 US GUIDE VASCULAR ACCESS: CPT

## 2021-02-27 PROCEDURE — 6360000002 HC RX W HCPCS: Performed by: SURGERY

## 2021-02-27 PROCEDURE — 99232 SBSQ HOSP IP/OBS MODERATE 35: CPT | Performed by: INTERNAL MEDICINE

## 2021-02-27 PROCEDURE — 36415 COLL VENOUS BLD VENIPUNCTURE: CPT

## 2021-02-27 PROCEDURE — 02HV33Z INSERTION OF INFUSION DEVICE INTO SUPERIOR VENA CAVA, PERCUTANEOUS APPROACH: ICD-10-PCS | Performed by: INTERNAL MEDICINE

## 2021-02-27 PROCEDURE — 6360000002 HC RX W HCPCS: Performed by: STUDENT IN AN ORGANIZED HEALTH CARE EDUCATION/TRAINING PROGRAM

## 2021-02-27 PROCEDURE — 2500000003 HC RX 250 WO HCPCS: Performed by: FAMILY MEDICINE

## 2021-02-27 PROCEDURE — 6360000002 HC RX W HCPCS: Performed by: INTERNAL MEDICINE

## 2021-02-27 PROCEDURE — C1751 CATH, INF, PER/CENT/MIDLINE: HCPCS

## 2021-02-27 PROCEDURE — 2500000003 HC RX 250 WO HCPCS: Performed by: STUDENT IN AN ORGANIZED HEALTH CARE EDUCATION/TRAINING PROGRAM

## 2021-02-27 PROCEDURE — 85025 COMPLETE CBC W/AUTO DIFF WBC: CPT

## 2021-02-27 PROCEDURE — APPSS30 APP SPLIT SHARED TIME 16-30 MINUTES: Performed by: NURSE PRACTITIONER

## 2021-02-27 PROCEDURE — 84100 ASSAY OF PHOSPHORUS: CPT

## 2021-02-27 PROCEDURE — 36569 INSJ PICC 5 YR+ W/O IMAGING: CPT

## 2021-02-27 PROCEDURE — 6370000000 HC RX 637 (ALT 250 FOR IP): Performed by: PSYCHIATRY & NEUROLOGY

## 2021-02-27 PROCEDURE — 83735 ASSAY OF MAGNESIUM: CPT

## 2021-02-27 PROCEDURE — 97164 PT RE-EVAL EST PLAN CARE: CPT

## 2021-02-27 RX ORDER — HYDROXYZINE HYDROCHLORIDE 10 MG/1
10 TABLET, FILM COATED ORAL ONCE
Status: COMPLETED | OUTPATIENT
Start: 2021-02-27 | End: 2021-02-27

## 2021-02-27 RX ORDER — BISACODYL 10 MG
10 SUPPOSITORY, RECTAL RECTAL DAILY PRN
Status: DISCONTINUED | OUTPATIENT
Start: 2021-02-27 | End: 2021-03-03

## 2021-02-27 RX ADMIN — HYDROMORPHONE HYDROCHLORIDE 1 MG: 1 INJECTION, SOLUTION INTRAMUSCULAR; INTRAVENOUS; SUBCUTANEOUS at 05:11

## 2021-02-27 RX ADMIN — HYDROXYZINE HYDROCHLORIDE 10 MG: 10 TABLET ORAL at 21:43

## 2021-02-27 RX ADMIN — DEXTROSE MONOHYDRATE: 100 INJECTION, SOLUTION INTRAVENOUS at 08:46

## 2021-02-27 RX ADMIN — INSULIN LISPRO 2 UNITS: 100 INJECTION, SOLUTION INTRAVENOUS; SUBCUTANEOUS at 14:38

## 2021-02-27 RX ADMIN — LABETALOL HYDROCHLORIDE 30 MG: 5 INJECTION INTRAVENOUS at 17:27

## 2021-02-27 RX ADMIN — ENOXAPARIN SODIUM 30 MG: 100 INJECTION SUBCUTANEOUS at 10:01

## 2021-02-27 RX ADMIN — INSULIN LISPRO 2 UNITS: 100 INJECTION, SOLUTION INTRAVENOUS; SUBCUTANEOUS at 17:27

## 2021-02-27 RX ADMIN — Medication 300 UNITS: at 21:03

## 2021-02-27 RX ADMIN — HYDROMORPHONE HYDROCHLORIDE 1 MG: 1 INJECTION, SOLUTION INTRAMUSCULAR; INTRAVENOUS; SUBCUTANEOUS at 14:53

## 2021-02-27 RX ADMIN — ATORVASTATIN CALCIUM 10 MG: 10 TABLET, FILM COATED ORAL at 10:02

## 2021-02-27 RX ADMIN — LIDOCAINE HYDROCHLORIDE 2 ML: 10 INJECTION, SOLUTION EPIDURAL; INFILTRATION; INTRACAUDAL; PERINEURAL at 16:00

## 2021-02-27 RX ADMIN — MORPHINE SULFATE 1 MG: 2 INJECTION, SOLUTION INTRAMUSCULAR; INTRAVENOUS at 02:22

## 2021-02-27 RX ADMIN — INSULIN LISPRO 4 UNITS: 100 INJECTION, SOLUTION INTRAVENOUS; SUBCUTANEOUS at 06:20

## 2021-02-27 RX ADMIN — SODIUM CHLORIDE, PRESERVATIVE FREE 10 ML: 5 INJECTION INTRAVENOUS at 21:03

## 2021-02-27 RX ADMIN — INSULIN LISPRO 4 UNITS: 100 INJECTION, SOLUTION INTRAVENOUS; SUBCUTANEOUS at 21:50

## 2021-02-27 RX ADMIN — SODIUM CHLORIDE, PRESERVATIVE FREE 10 ML: 5 INJECTION INTRAVENOUS at 10:03

## 2021-02-27 RX ADMIN — TIMOLOL MALEATE 1 DROP: 5 SOLUTION/ DROPS OPHTHALMIC at 10:03

## 2021-02-27 RX ADMIN — POTASSIUM CHLORIDE: 2 INJECTION, SOLUTION, CONCENTRATE INTRAVENOUS at 18:18

## 2021-02-27 RX ADMIN — INSULIN LISPRO 2 UNITS: 100 INJECTION, SOLUTION INTRAVENOUS; SUBCUTANEOUS at 10:00

## 2021-02-27 RX ADMIN — INSULIN LISPRO 2 UNITS: 100 INJECTION, SOLUTION INTRAVENOUS; SUBCUTANEOUS at 02:18

## 2021-02-27 RX ADMIN — LATANOPROST 1 DROP: 50 SOLUTION OPHTHALMIC at 20:58

## 2021-02-27 RX ADMIN — BRIMONIDINE TARTRATE 1 DROP: 2 SOLUTION OPHTHALMIC at 10:03

## 2021-02-27 RX ADMIN — TIMOLOL MALEATE 1 DROP: 5 SOLUTION/ DROPS OPHTHALMIC at 20:57

## 2021-02-27 RX ADMIN — BRIMONIDINE TARTRATE 1 DROP: 2 SOLUTION OPHTHALMIC at 17:27

## 2021-02-27 RX ADMIN — ASPIRIN 81 MG: 81 TABLET, CHEWABLE ORAL at 10:02

## 2021-02-27 ASSESSMENT — PAIN SCALES - GENERAL
PAINLEVEL_OUTOF10: 0
PAINLEVEL_OUTOF10: 8
PAINLEVEL_OUTOF10: 4
PAINLEVEL_OUTOF10: 0

## 2021-02-27 NOTE — PROGRESS NOTES
Physical Therapy    [facility]  Re-Assessment    NAME: Doreen Ontiveros  : 1940  MRN: [id number]    Date of Service: 2021      Patient Diagnosis(es): The primary encounter diagnosis was SBO (small bowel obstruction) ([facility] [address] Diagnoses of EMELY (acute kidney injury) ([facility] Generalized abdominal pain, Blind in both eyes, Supplemental oxygen dependent, and Elevated brain natriuretic peptide (BNP) level were also pertinent to this visit. has a past medical history of Blind right eye, CAD (coronary artery disease), CHF (congestive heart failure) ([facility]), Chronic respiratory failure ([facility] CKD (chronic kidney disease) stage 3, GFR 30-59 ml/min ([facility]), Diabetes mellitus ([facility] Diabetes mellitus type 2, controlled ([facility] [facility] HLD (hyperlipidemia), Hypertension, and TIA (transient ischemic attack). has a past surgical history that includes Coronary artery bypass graft (); Eye surgery; ECHO Compl W Dop Color Flow (2013); ECHO Compl W Dop Color Flow (2013); and Cardiac surgery. Referring Provider:  Enmanuel Nguyễn MD      Evaluating Therapist: [facility] PSYCHIATRY PT      Room #:642  DIAGNOSIS: SBO  Additional Pertinent History:CAD, CHF, legally blind  PRECAUTIONS: falls, NG, O2, legally blind, alarm    Social:  Pt lives alone in a 2 floor plan. Has half bath on first floor  Prior to admission independent without device. Initial Evaluation  Date:  Treatment      Short Term/ Long Term   Goals   Was pt agreeable to Eval/treatment? yes     Does pt have pain?  Sore throat     Bed Mobility  Rolling: mod assit  Supine to sit: mod assist of 2  Sit to supine: mod assist of 2  Scooting: mod assist of 2  Min assist   Transfers Sit to stand: mod assist of 2  Stand to sit: mod assist of 2  Stand pivot: NT  Min assist   Ambulation    3 feet sidestepping with ww with mod assist of 2  25 feet with ww with min assist   Stair Negotiation  Ascended and descended  NT      LE strength     3+/5 4-/5   balance      Fair at ww     AM-PAC Raw score               11/24         Pt is alert and Oriented   LE ROM: WFL  Sensation: intact  Edema: none  Endurance: fair     ASSESSMENT  Pt displays functional ability as noted in the objective portion of this evaluation. Patient education  Pt educated on log roll technique for bed mobiltiy    Patient response to education:   Pt verbalized understanding Pt demonstrated skill Pt requires further education in this area   yes With assist yes     ASSESSMENT:    Comments: Pt cooperative and agreeable to PT session. No c/o dizziness sitting up edge of bed. Pt able to stand at ww mod assist of 2. Pt's/ family goals   1. To get stronger    Patient and or family understand(s) diagnosis, prognosis, and plan of care. PLAN OF CARE:    Current Treatment Recommendations     [x] Strengthening     [] ROM   [x] Balance Training   [x] Endurance Training   [x] Transfer Training   [x] Gait Training   [] Stair Training   [] Positioning   [x] Safety and Education Training   [x] Patient/Caregiver Education   [] HEP  [] Other     Frequency of treatments: 2-5x/week x 5-7. days    Time in  1208  Time out  1223        Evaluation Time includes thorough review of current medical information, gathering information on past medical history/social history and prior level of function, completion of standardized testing/informal observation of tasks, assessment of data and education on plan of care and goals.     CPT codes:  [] Low Complexity PT evaluation 26288  [] Moderate Complexity PT evaluation 06032  [] High Complexity PT evaluation 09479  [x] PT Re-evaluation 01460  [] Gait training 48277 minutes  [] Manual therapy 74254 minutes  [] Therapeutic activities 44780 minutes  [] Therapeutic exercises 37504 minutes  [] Neuromuscular reeducation 96855 minutes     [facility] PSYCHIATRY PT [id number]

## 2021-02-27 NOTE — PROGRESS NOTES
Department of Surgery - Adult  General Surgery  Dr. Lyndsey Garza's Progress Note      SUBJECTIVE: The patient feels better    OBJECTIVE      Physical    VITALS:  BP (!) 139/53   Pulse 94   Temp 100 °F (37.8 °C) (Axillary)   Resp 18   Ht 5' 11\" (1.803 m)   Wt 197 lb 6.4 oz (89.5 kg)   SpO2 98%   BMI 27.53 kg/m²   INTAKE/OUTPUT:      Intake/Output Summary (Last 24 hours) at 2021 0827  Last data filed at 2021 0615  Gross per 24 hour   Intake  ml   Output 1820 ml   Net 201 ml     TEMPERATURE:  Current - Temp: 100 °F (37.8 °C);  Max - Temp  Av.9 °F (37.7 °C)  Min: 98.2 °F (36.8 °C)  Max: 100.2 °F (37.9 °C)  RESPIRATIONS RANGE: Resp  Av.2  Min: 16  Max: 20  PULSE RANGE: Pulse  Av.9  Min: 80  Max: 104  BLOOD PRESSURE RANGE:  Systolic (58LDU), DVM:471 , Min:86 , FL   ; Diastolic (76YOW), HDQ:16, Min:50, Max:125    PULSE OXIMETRY RANGE: SpO2  Av %  Min: 66 %  Max: 100 %  CONSTITUTIONAL:  awake, alert, cooperative, no apparent distress, and appears stated age  ABDOMEN: The abdomen is soft, with slight distention and associated incisional tenderness  Data  CBC with Differential:    Lab Results   Component Value Date    WBC 9.2 2021    RBC 4.27 2021    HGB 13.6 2021    HCT 41.4 2021     2021    MCV 97.0 2021    MCH 31.9 2021    MCHC 32.9 2021    RDW 14.1 2021    NRBC 0.0 2021    SEGSPCT 56 2014    LYMPHOPCT 11.3 2021    MONOPCT 17.4 2021    BASOPCT 0.0 2021    MONOSABS 1.44 2021    LYMPHSABS 1.87 2021    EOSABS 0.00 2021    BASOSABS 0.00 2021     CMP:    Lab Results   Component Value Date     2021    K 4.1 2021    K 4.2 2021     2021    CO2 31 2021    BUN 20 2021    CREATININE 1.3 2021    GFRAA >60 2021    LABGLOM >60 2021    GLUCOSE 252 2021    PROT 7.2 2021    LABALBU 4.2 2021 CALCIUM 9.0 02/26/2021    BILITOT 0.7 02/20/2021    ALKPHOS 56 02/20/2021    AST 24 02/20/2021    ALT 19 02/20/2021     BMP:  Hepatic Function Panel:  Ionized Calcium:  No results found for: IONCA  Magnesium:    Lab Results   Component Value Date    MG 2.5 02/26/2021     Phosphorus:    Lab Results   Component Value Date    PHOS 1.3 02/26/2021       Current Inpatient Medications    Current Facility-Administered Medications: lidocaine PF 1 % injection 5 mL, 5 mL, Intradermal, Once  sodium chloride flush 0.9 % injection 10 mL, 10 mL, Intravenous, PRN  heparin flush 100 UNIT/ML injection 300 Units, 3 mL, Intravenous, 2 times per day  heparin flush 100 UNIT/ML injection 300 Units, 3 mL, Intracatheter, PRN  PN-Adult  3-in-1 Central Line (Standard), , Intravenous, Continuous TPN  oxyCODONE (ROXICODONE) immediate release tablet 5 mg, 5 mg, Oral, Q4H PRN **OR** oxyCODONE (ROXICODONE) immediate release tablet 10 mg, 10 mg, Oral, Q4H PRN  dextrose 10 % infusion, , Intravenous, Continuous  HYDROmorphone (DILAUDID) injection 0.5 mg, 0.5 mg, Intravenous, Q4H PRN **OR** HYDROmorphone (DILAUDID) injection 1 mg, 1 mg, Intravenous, Q4H PRN  aspirin chewable tablet 81 mg, 81 mg, Oral, Daily  hydrALAZINE (APRESOLINE) injection 20 mg, 20 mg, Intravenous, Q6H PRN  lansoprazole suspension SUSP 30 mg, 30 mg, Per NG tube, Daily  labetalol (NORMODYNE;TRANDATE) injection 30 mg, 30 mg, Intravenous, Q6H  morphine (PF) injection 1 mg, 1 mg, Intravenous, Q4H PRN  ceFAZolin (ANCEF) 2000 mg in sterile water 20 mL IV syringe, 2,000 mg, Intravenous, See Admin Instructions  atorvastatin (LIPITOR) tablet 10 mg, 10 mg, Oral, Daily  phenol 1.4 % mouth spray 1 spray, 1 spray, Mouth/Throat, Q2H PRN  trimethobenzamide (TIGAN) injection 200 mg, 200 mg, Intramuscular, Q6H PRN  brimonidine (ALPHAGAN) 0.2 % ophthalmic solution 1 drop, 1 drop, Left Eye, BID  ipratropium-albuterol (DUONEB) nebulizer solution 3 mL, 3 mL, Inhalation, Q6H PRN  latanoprost (XALATAN) 0.005 % ophthalmic solution 1 drop, 1 drop, Left Eye, Nightly  nitroGLYCERIN (NITROSTAT) SL tablet 0.4 mg, 0.4 mg, Sublingual, Q5 Min PRN  timolol (TIMOPTIC) 0.5 % ophthalmic solution 1 drop, 1 drop, Left Eye, BID  sodium chloride flush 0.9 % injection 10 mL, 10 mL, Intravenous, 2 times per day  sodium chloride flush 0.9 % injection 10 mL, 10 mL, Intravenous, PRN  enoxaparin (LOVENOX) injection 30 mg, 30 mg, Subcutaneous, Daily  polyethylene glycol (GLYCOLAX) packet 17 g, 17 g, Oral, Daily PRN  acetaminophen (TYLENOL) tablet 650 mg, 650 mg, Oral, Q6H PRN **OR** acetaminophen (TYLENOL) suppository 650 mg, 650 mg, Rectal, Q6H PRN  glucose (GLUTOSE) 40 % oral gel 15 g, 15 g, Oral, PRN  dextrose 50 % IV solution, 12.5 g, Intravenous, PRN  glucagon (rDNA) injection 1 mg, 1 mg, Intramuscular, PRN  dextrose 5 % solution, 100 mL/hr, Intravenous, PRN  insulin lispro (HUMALOG) injection vial 0-12 Units, 0-12 Units, Subcutaneous, Q4H  influenza A&B vaccine (FLUAD QUADRIVALENT) injection 0.5 mL, 0.5 mL, Intramuscular, Prior to discharge    ASSESSMENT:    [de-identified] y.o. male status post exploratory laparotomy for small bowel obstruction related to adhesive bands post op day #  1    PLAN:    Remove Singleton tomorrow morning  Clamp nasogastric tube if passing flatus    Immanuel Crowell 2/27/20218:27 AM

## 2021-02-27 NOTE — PROCEDURES
PICC   Catheter insertion date 2/27/2021     Product Number:  [id number]   Lot No: [id number]   Gauge: 5 fr   Lumen: dual   Rt Brachial    Vein Diameter : 0.6 cm   Upper arm circumference (10CM ABOVE AC): 32.5 cm   Catheter Length : 38 cm(12 cm cut from a 50 cm line)   Internal Length: 36 cm   External Catheter Length: 2 cm   Ultrasound Used:yes  VPS [facility] [place] confirms PICC tip is placed in the lower 1/3 of the SVC or at the Cavoatrial junction. Floor nurse notified PICC is okay to use.    : Mary Lemus RN

## 2021-02-27 NOTE — PROGRESS NOTES
Nephrology Note  Adonis Rojas MD          Patient seen and examined. No family member is present at bedside. Chart reviewed. Patient had been off TPN for the last one day as he had lost IV access. Presently D10 is running. Patient is in the process of getting a PICC line. Patient is feeling better. Denies shortness of breath. Appetite good. No nausea or dysguesia. Awake and alert . In no acute distress. Vital SignsBP (!) 138/54   Pulse 90   Temp 99.2 °F (37.3 °C) (Axillary)   Resp 19   Ht 5' 11\" (1.803 m)   Wt 197 lb 6.4 oz (89.5 kg)   SpO2 97%   BMI 27.53 kg/m²   24HR INTAKE/OUTPUT:    Intake/Output Summary (Last 24 hours) at 2/27/2021 1611  Last data filed at 2/27/2021 0615  Gross per 24 hour   Intake 2021 ml   Output 1370 ml   Net 651 ml         Physical Exam  HEENT :NG tube in place, poor oral dental hygiene  Neck: No JVD  Lungs: Breath sounds decreased at the bases. No rales or ronchi. Heart: Regular rate and rhythm. No S3 gallop. No  murmrur. Abdomen: Soft, distended, incisional tenderness,  and normal bowel sounds.   Extremeties: Trace bipedal edema          Current Facility-Administered Medications   Medication Dose Route Frequency Provider Last Rate Last Admin    bisacodyl (DULCOLAX) suppository 10 mg  10 mg Rectal Daily PRN Errol Madden MD        PN-Adult  3 IN 1 Central Line (Standard)   Intravenous Continuous TPN Errol Mdaden MD        lidocaine PF 1 % injection 5 mL  5 mL Intradermal Once Jose David Del Real MD        sodium chloride flush 0.9 % injection 10 mL  10 mL Intravenous PRN Jose David Del Real MD        heparin flush 100 UNIT/ML injection 300 Units  3 mL Intravenous 2 times per day Jose David Del Real MD        heparin flush 100 UNIT/ML injection 300 Units  3 mL Intracatheter PRN Jose David Del Real MD        PN-Adult  3-in-1 Central Line (Standard)   Intravenous Continuous TPN Jose David Del Real MD        oxyCODONE (ROXICODONE) immediate release tablet 5 mg  5 mg Oral Q4H PRN Nevaeh Preston MD        Or    oxyCODONE (ROXICODONE) immediate release tablet 10 mg  10 mg Oral Q4H PRN Nevaeh Preston MD        dextrose 10 % infusion   Intravenous Continuous Scott Hric, DO 75 mL/hr at 02/27/21 0846 New Bag at 02/27/21 0846    HYDROmorphone (DILAUDID) injection 0.5 mg  0.5 mg Intravenous Q4H PRN Velma Dawson MD   0.5 mg at 02/26/21 2339    Or    HYDROmorphone (DILAUDID) injection 1 mg  1 mg Intravenous Q4H PRN Velma Dawson MD   1 mg at 02/27/21 1453    aspirin chewable tablet 81 mg  81 mg Oral Daily Jane Meléndez, DO   81 mg at 02/27/21 1002    hydrALAZINE (APRESOLINE) injection 20 mg  20 mg Intravenous Q6H PRN Elvia Padilla MD   20 mg at 02/26/21 2226    lansoprazole suspension SUSP 30 mg  30 mg Per NG tube Daily Johnathan Rogel MD        labetalol (NORMODYNE;TRANDATE) injection 30 mg  30 mg Intravenous Q6H Yamileth Patel, DO   30 mg at 02/26/21 2345    morphine (PF) injection 1 mg  1 mg Intravenous Q4H PRN Danay Antony MD   1 mg at 02/27/21 0222    ceFAZolin (ANCEF) 2000 mg in sterile water 20 mL IV syringe  2,000 mg Intravenous See Admin Instructions Johnathan Rogel MD   2 g at 02/26/21 1630    atorvastatin (LIPITOR) tablet 10 mg  10 mg Oral Daily Danay Antony MD   10 mg at 02/27/21 1002    phenol 1.4 % mouth spray 1 spray  1 spray Mouth/Throat Q2H PRN Danay Antony MD   1 spray at 02/24/21 0437    trimethobenzamide (TIGAN) injection 200 mg  200 mg Intramuscular Q6H PRN Danay Antony MD   200 mg at 02/22/21 2114    brimonidine (ALPHAGAN) 0.2 % ophthalmic solution 1 drop  1 drop Left Eye BID Janette Hidalgo MD   1 drop at 02/27/21 1003    ipratropium-albuterol (DUONEB) nebulizer solution 3 mL  3 mL Inhalation Q6H PRN Janette Hidalgo MD        latanoprost (XALATAN) 0.005 % ophthalmic solution 1 drop  1 drop Left Eye Nightly Janette Hidalgo MD   1 drop at 02/26/21 2043    nitroGLYCERIN (NITROSTAT) SL tablet 0.4 mg  0.4 mg Sublingual Q5 Min PRN Zakia Cedeno MD        timolol (TIMOPTIC) 0.5 % ophthalmic solution 1 drop  1 drop Left Eye BID Janette Hidalgo MD   1 drop at 02/27/21 1003    sodium chloride flush 0.9 % injection 10 mL  10 mL Intravenous 2 times per day Zakia Cedeno MD   10 mL at 02/27/21 1003    sodium chloride flush 0.9 % injection 10 mL  10 mL Intravenous PRN Janette Hidalgo MD   10 mL at 02/23/21 1527    enoxaparin (LOVENOX) injection 30 mg  30 mg Subcutaneous Daily Janette Hidalgo MD   30 mg at 02/27/21 1001    polyethylene glycol (GLYCOLAX) packet 17 g  17 g Oral Daily PRN Janette Hidalgo MD        acetaminophen (TYLENOL) tablet 650 mg  650 mg Oral Q6H PRN Janette Hidalgo MD   650 mg at 02/21/21 1539    Or    acetaminophen (TYLENOL) suppository 650 mg  650 mg Rectal Q6H PRN Janette Hidalgo MD        glucose (GLUTOSE) 40 % oral gel 15 g  15 g Oral PRN Janette Hidalgo MD        dextrose 50 % IV solution  12.5 g Intravenous PRN Janette Hidalgo MD        glucagon (rDNA) injection 1 mg  1 mg Intramuscular PRN Janette Hidalgo MD        dextrose 5 % solution  100 mL/hr Intravenous PRN Janette Hidalgo MD        insulin lispro (HUMALOG) injection vial 0-12 Units  0-12 Units Subcutaneous Q4H Janette Hidalgo MD   2 Units at 02/27/21 1438    influenza A&B vaccine (FLUAD QUADRIVALENT) injection 0.5 mL  0.5 mL Intramuscular Prior to discharge Lalitha Vernon MD           [facility]   Final Result   The right internal carotid artery demonstrates 50-69% stenosis. The left internal carotid artery demonstrates 70-99% stenosis. Bilateral vertebral arteries are patent with flow in the normal direction. NM Cardiac Stress Test Nuclear Imaging   Final Result   The myocardial perfusion imaging demonstrates a fixed inferolateral   defect with corresponding wall motion abnormality consistent with a   prior myocardial infarction. No reversible ischemia is seen. The calculated ejection fraction 64%.  However, visually the left   ventricle appears to be severely dilated with moderate global   hypokinesis and severe hypokinesis of the inferolateral wall. Consider   an echocardiogram if clinically indicated. MRI BRAIN W WO CONTRAST   Final Result   1. Questionable punctate 4 mm diameter focus of acute ischemia involving the   mid left frontal lobe deep white matter. 2. Moderate cerebral white matter chronic microvascular ischemic disease. 3. Mild diffuse cerebral atrophy. CT HEAD WO CONTRAST   Final Result   1. There is no acute intracranial abnormality. Specifically, there is no   intracranial hemorrhage. 2. Atrophy and periventricular leukomalacia,   3. Given the symptoms of visual loss an asses of any infarct within the   occipital lobes or suprasellar mass dedicated MRI of the brain is recommended   for further evaluation. XR ABDOMEN FOR NG/OG/NE TUBE PLACEMENT   Final Result   Satisfactory position of nasogastric tube following advancement. XR ABDOMEN (KUB) (SINGLE AP VIEW)   Final Result   The tip of the NG tube is at the level of the diaphragm. Please advance the   NG tube by an additional 10 cm   Multiple dilated loops of small bowel consistent with small bowel obstruction. CT ABDOMEN PELVIS WO CONTRAST Additional Contrast? Oral   Final Result   Persistent high-grade small-bowel obstruction with significantly dilated   proximal small bowel loops with the transition point in the right lower   quadrant with the swirling appearance in the mesentery. Mesenteric or   midgrade volvulus or adhesions/stricture is considered. Decompression is   recommended. XR ABDOMEN FOR NG/OG/NE TUBE PLACEMENT   Final Result   Satisfactory position of enteric tube. CT ABDOMEN PELVIS WO CONTRAST Additional Contrast? None   Final Result   Dilatation of multiple segments of small bowel throughout the abdomen and   pelvis with air-fluid levels consistent with small bowel obstruction. 2 areas of transition are seen in the right mid abdomen. MD  Nephrology  Electronically signed by Mikayla Cervantes MD on 2/27/2021 at 4:09 PM      Note: This report was completed utilizing computer voice recognition software. Every effort has been made to ensure accuracy, however; inadvertent computerized transcription errors may be present.

## 2021-02-27 NOTE — PROGRESS NOTES
[id number] Hospitalist   Progress Note    Admitting Date and Time: 2/20/2021  5:15 PM  Admit Dx: Intestinal obstruction (HCC) [K56.609]    Subjective:    Pt lying in bed eyes closed. Easily awakens to name. States he feels ok today. NGT remains to LIS suction. POD#1 Exp Lap. No new concerns noted. Per RN: No new concerns noted. ROS: denies fever, chills, cp, sob, n/v, HA unless stated above.      lidocaine PF  5 mL Intradermal Once    heparin flush  3 mL Intravenous 2 times per day    aspirin  81 mg Oral Daily    lansoprazole  30 mg Per NG tube Daily    labetalol  30 mg Intravenous Q6H    ceFAZolin  2,000 mg Intravenous See Admin Instructions    atorvastatin  10 mg Oral Daily    brimonidine  1 drop Left Eye BID    latanoprost  1 drop Left Eye Nightly    timolol  1 drop Left Eye BID    sodium chloride flush  10 mL Intravenous 2 times per day    enoxaparin  30 mg Subcutaneous Daily    insulin lispro  0-12 Units Subcutaneous Q4H    influenza virus vaccine  0.5 mL Intramuscular Prior to discharge         sodium chloride flush, 10 mL, PRN      heparin flush, 3 mL, PRN      oxyCODONE, 5 mg, Q4H PRN    Or      oxyCODONE, 10 mg, Q4H PRN      HYDROmorphone, 0.5 mg, Q4H PRN    Or      HYDROmorphone, 1 mg, Q4H PRN      hydrALAZINE, 20 mg, Q6H PRN      morphine, 1 mg, Q4H PRN      phenol, 1 spray, Q2H PRN      trimethobenzamide, 200 mg, Q6H PRN      ipratropium-albuterol, 3 mL, Q6H PRN      nitroGLYCERIN, 0.4 mg, Q5 Min PRN      sodium chloride flush, 10 mL, PRN      polyethylene glycol, 17 g, Daily PRN      acetaminophen, 650 mg, Q6H PRN    Or      acetaminophen, 650 mg, Q6H PRN      glucose, 15 g, PRN      dextrose, 12.5 g, PRN      glucagon (rDNA), 1 mg, PRN      dextrose, 100 mL/hr, PRN         Objective:    BP (!) 139/53   Pulse 94   Temp 100 °F (37.8 °C) (Axillary)   Resp 18   Ht 5' 11\" (1.803 m)   Wt 197 lb 6.4 oz (89.5 kg)   SpO2 98%   BMI 27.53 kg/m² General Appearance: alert and oriented to person, place and reoriented to time and in no acute distress  Skin: warm and dry  Head: normocephalic and atraumatic. Left Nares NGT  Eyes: pupils equal, round, and reactive to light, extraocular eye movements intact, conjunctivae normal  Neck: neck supple and non tender without mass   Pulmonary/Chest: clear to auscultation bilaterally- no wheezes, rales or rhonchi, normal air movement, no respiratory distress  Cardiovascular: normal rate, normal S1 and S2 and no no rubs, gallops, murmur noted. Abdomen: soft, non-tender, non-distended, normal bowel sounds, no rebound guarding rigidity noted. Extremities: no cyanosis, no clubbing and no edema  Neurologic: no cranial nerve deficit and speech normal      Recent Labs     02/24/21 2000 02/25/21  0230 02/26/21  0345   * 151* 156*   K 4.2 3.7 4.1   * 118* 122*   CO2 28 27 31*   BUN 35* 29* 20   CREATININE 1.3* 1.3* 1.3*   GLUCOSE 273* 255* 252*   CALCIUM 8.5* 8.5* 9.0       No results for input(s): ALKPHOS, PROT, LABALBU, BILITOT, AST, ALT in the last 72 hours. Recent Labs     02/25/21  1012   WBC 9.2   RBC 4.27   HGB 13.6   HCT 41.4   MCV 97.0   MCH 31.9   MCHC 32.9   RDW 14.1      MPV 10.4            Radiology:   US CAROTID ARTERY BILATERAL   Final Result   The right internal carotid artery demonstrates 50-69% stenosis. The left internal carotid artery demonstrates 70-99% stenosis. Bilateral vertebral arteries are patent with flow in the normal direction. NM Cardiac Stress Test Nuclear Imaging   Final Result   The myocardial perfusion imaging demonstrates a fixed inferolateral   defect with corresponding wall motion abnormality consistent with a   prior myocardial infarction. No reversible ischemia is seen. The calculated ejection fraction 64%.  However, visually the left   ventricle appears to be severely dilated with moderate global   hypokinesis and severe hypokinesis of the inferolateral wall. Consider   an echocardiogram if clinically indicated. MRI BRAIN W WO CONTRAST   Final Result   1. Questionable punctate 4 mm diameter focus of acute ischemia involving the   mid left frontal lobe deep white matter. 2. Moderate cerebral white matter chronic microvascular ischemic disease. 3. Mild diffuse cerebral atrophy. CT HEAD WO CONTRAST   Final Result   1. There is no acute intracranial abnormality. Specifically, there is no   intracranial hemorrhage. 2. Atrophy and periventricular leukomalacia,   3. Given the symptoms of visual loss an asses of any infarct within the   occipital lobes or suprasellar mass dedicated MRI of the brain is recommended   for further evaluation. XR ABDOMEN FOR NG/OG/NE TUBE PLACEMENT   Final Result   Satisfactory position of nasogastric tube following advancement. XR ABDOMEN (KUB) (SINGLE AP VIEW)   Final Result   The tip of the NG tube is at the level of the diaphragm. Please advance the   NG tube by an additional 10 cm   Multiple dilated loops of small bowel consistent with small bowel obstruction. CT ABDOMEN PELVIS WO CONTRAST Additional Contrast? Oral   Final Result   Persistent high-grade small-bowel obstruction with significantly dilated   proximal small bowel loops with the transition point in the right lower   quadrant with the swirling appearance in the mesentery. Mesenteric or   midgrade volvulus or adhesions/stricture is considered. Decompression is   recommended. XR ABDOMEN FOR NG/OG/NE TUBE PLACEMENT   Final Result   Satisfactory position of enteric tube. CT ABDOMEN PELVIS WO CONTRAST Additional Contrast? None   Final Result   Dilatation of multiple segments of small bowel throughout the abdomen and   pelvis with air-fluid levels consistent with small bowel obstruction. 2 areas of transition are seen in the right mid abdomen.    A transition is seen at a site of rotatory configuration of small bowel and mesenteric vessels with narrowing of small bowel suggestive of small bowel   volvulus. Follow-up with oral contrast may be helpful in further evaluation. Enlarged prostate. Prominent atherosclerotic peripheral vascular disease. XR CHEST PORTABLE   Final Result   Mild cardiomegaly status post median sternotomy. No acute infiltrates or   failure. CTA NECK W CONTRAST    (Results Pending)       Assessment:  Active Problems:    Preoperative cardiovascular examination    SBO (small bowel obstruction) ([address]    Cerebrovascular accident (CVA) ([address]  Resolved Problems:    * No resolved hospital problems. *      Plan:  1. SBO- 2/21/2020 CT abdomen pelvis with significantly dilated fluid-filled proximal small bowel loops measuring up to 4.2 cm. Distal small bowel loops and colon collapsed. Narrowing of small bowel loops in the right lower quadrant with a twisted appearance. 2/26/2021 S/P Exp laparotomy for small bowel obstruction related to adhesive bands NPO. NGT for decompression. TPN on hold. Awaiting PICC placement. General surgery input appreciated. 2. Hypernatremia- Na+ 156. TPN  Na+ Adjustment. Continue to follow closely. Nephrology input appreciated. BMP pending  3. Hypophosphatemia- Phos 1.3. Nephrology following. 4. Hypokalemia- K+4.1. Continue to follow closely replete as needed. 5. EMELY superimposed on CKD stage IIIa-BUN/Crt 20/1.3. Avoid nephrotoxic agents. Follow closely. 6. Hx CAD-denies CP.  1/1/1994 CABG x4. Continue BB/ASA/statin therapy. 7. DM-A1c 7.6  Glucose 196. Continue SSI/hypoglycemic protocol/POCGT. Follow Adjust as needed. 8.  HTN-significant hypertension noted. Labetalol 30 mg IV every 6 hours initiated. Follow closely adjust as needed. 9. ?Left Frontal Lobe ischemia-Acute-subacute ischemic stroke left middle cerebral artery territory likely lacunar infarction due to small vessel disease. Continue ASA/statin therapy. Carotid US Right internal artery 50-69%. Left internal Artery 70-99%. Bilateral vertebral arteries are patent with flow in normal direction. If Delay in carotid intervention, Plavix will need added to ASA therapy. Per Neurology. Neurology/vascular Surgery input appreciated. 10. Left eye vision loss-glaucoma specialist in [place] for follow-up as outpatient. Likely loss of vision 2/2 vascular disease per ophthalmology. 11. Bilateral Carotid Stenosis- Carotid US Right internal artery 50-69%. Left internal Artery 70-99%. Bilateral vertebral arteries are patent with flow in normal direction. Dual Platelet therapy recommended. No immediate plans for Carotid endarterectomy per Vascular Surgery. Vascular surgery input appreciated. NOTE: This report was transcribed using voice recognition software. Every effort was made to ensure accuracy; however, inadvertent computerized transcription errors may be present. Electronically signed by Breana Thomas CNP on 2/27/2021 at 7:27 AM    HOSPITALIST ATTENDING PHYSICIAN NOTE 2/27/2021 2359PM:    Details of the evaluation - subjective assessment (including medication profile, past medical, family and social history when applicable), examination, review of lab and test data, diagnostic impressions and medical decision making - performed by Jose A Hastings. Jose Thomas CNP, were discussed with me on the date of service and I agree with clinical information herein unless otherwise noted. The patient has been evaluated by me personally earlier today. Pt reports no fevers, chills,n/v     Exam: heart reg at rate of 96,lungs cta, abd pos bs soft nt, ext neg for le edema    I agree with the assessment and plan of Jose A Hastings. Jose Thomas CNP    sbo  Hypernatremia  cva likely lacunar infarction  itz  Dm type 2 controlled  htn  Left eye vision loss  hypophosphatemia  Hypokalemia  thrombocytopenia      Electronically signed by Levester Collet, D.O.   Hospitalist  [facility] at [facility]

## 2021-02-27 NOTE — PROGRESS NOTES
P Quality Flow/Interdisciplinary Rounds Progress Note        Quality Flow Rounds held on February 27, 2021    Disciplines Attending:  Bedside Nurse,  and     Oelwein Jaleesa was admitted on 2/20/2021  5:15 PM    Anticipated Discharge Date:  Expected Discharge Date: 03/02/21    Disposition:    Justo Score:  Justo Scale Score: 19    Readmission Risk              Risk of Unplanned Readmission:        23           Discussed patient goal for the day, patient clinical progression, and barriers to discharge. The following Goal(s) of the Day/Commitment(s) have been identified:  monitor NG output, initiate TPN today.       Vadim Vargas  February 27, 2021

## 2021-02-27 NOTE — PROGRESS NOTES
Occupational Therapy  OCCUPATIONAL THERAPY RE-EVALUATION      Date:2021  Patient Name: Oscar Lucia  MRN: [id number]  : 1940  Room: [address] [place] [address]    Referring Provider: Pamela Steen MD   Evaluating OT: Jasieljanice Araiza OTR/L [id number]  Re-Evaluating OT: Laurel Thomas [id number]    AM-PAC Daily Activity Raw Score:     Recommended Adaptive Equipment: Continue to assess. Diagnosis: Intestinal obstruction ([address] [T17.468]   Surgery: Patient underwent exploratory laparotomy lysis of adhesions on 2021. Pertinent Medical History: CAD, CKD, DM, HTN, TIA, CHF      Precautions: falls, NG tube to suction, O2 via nasal cannula, visual impairment (legally blind), hearing impairment, bed alarm, skin integrity    Home Living: Patient lives alone in a two-floor home; patient's bedroom and bathroom are on the second floor. Bathroom Setup: tub shower with standard-height toilet    Prior Level of Function (PLOF): Patient noted that he had been independent with ADLs, but had needed assistance with most IADLs. Patient had been independent with functional mobility (without device) prior to this hospitalization. Driving: No - family assists    Pain Level: Patient reported experiencing a \"sore throat\"; nursing notified of patient's complaint. Cognition: Patient alert and oriented x3. WFL command follow demonstrated. Patient pleasant and cooperative. Memory: Fair   Sequencing: Fair   Problem Solving: Fair   Judgement/Safety: Fair    Functional Assessment:   Initial Eval Status:  Date: 2021 Re-Eval Status  Date: 2021 Treatment Status  Date:  Updated Short Term Goals   Feeding Min A N/A - NPO  NG tube to suction.   Setup  (when appropriate)   Grooming Min A Mod A  SBA  (seated/standing at sink)   UB Dressing Min A Mod A  SBA   LB Dressing Max A Max A  Min A - with use of AE, as needed/appropriate   Bathing Mod A Max A  Min A - with use of AE/DME, as needed/appropriate   Toileting Mod A Max A  Patient with esquivel catheter currently. Min A   Bed Mobility  Supine-to-Sit: Mod A Supine-to-Sit: Mod Ax2  Sit-to-Supine: Mod Ax2      Functional Transfers Sit-to-Stand: Min A Sit-to-Stand: Mod Ax2   from EOB  CGA   Functional Mobility Min A (with walker) small steps to arm chair; limited further d/t reported weakness/fatigue Mod Ax2 for few small side steps toward [facility]   (with walker)  CGA with functional mobility (with device, as needed/appropriate) in order to maximize independence with ADLs/IADLs and other functional tasks. Balance Sitting: Fair    Standing: Fair-  (with walker) Sitting: Fair  (at EOB)  Standing: Poor+  (with walker)  Fair dynamic standing balance during completion of ADLs/IADLs and other functional tasks. Activity Tolerance Poor  3L O2 throughout session. Limited  Patient will demonstrate Good understanding and consistent implementation of energy conservation techniques and work simplification techniques into ADL/IADL routines. Visual/  Perceptual Legally blind - patient reports primarily black Impaired  Patient is legally blind. N/A   B UE Strength 4-/5 4-/5  Patient will demonstrate 4+/5 B UE strength in order to maximize independence with ADLs/IADLs and functional transfers. Long-Term Goal: Patient will increase functional independence to PLOF in order to allow patient to live in least restrictive environment. ROM: Additional Information:    R UE  WFL    L UE WFL      Hearing: Fair  Sensation: No complaints of numbness/tingling in B UEs. Tone: WFL  Edema: No    Comments: RN approved patient's participation in EOB/OOB activities. Upon arrival, patient supine in bed. At end of session, patient supine in bed with call light and phone within reach, bed alarm activated, and all lines and tubes intact. Patient would benefit from continued skilled OT to increase safety and independence with completion of ADL/IADL tasks for functional independence and quality of life. Assessment of Current Deficits:   Functional Mobility [x]  ADLs [x] Strength [x]  Cognition []  Functional Transfers  [x] IADLs [x] Safety Awareness [x]  Endurance [x]  Fine Motor Coordination [] Balance [x] Vision/Perception [] Sensation []   Gross Motor Coordination [] ROM [] Delirium []                  Motor Control []    Updated Plan of Care: 2-5 days/week for 1-2 weeks PRN  [x]ADL retraining/adaptive techniques and AE recommendations to increase functional independence within precautions  [x]Energy conservation techniques to improve tolerance for ADLs/IADLs  [x]Functional transfer/mobility training/DME recommendations for increased independence, safety and fall prevention  [x]Patient/family education to increase safety and functional independence  [x]Environmental modifications for safe mobility and completion of ADLs/IADLs  []Cognitive retraining exercises to improve problem solving skills & safe participation in ADLs/IADLs   []Sensory re-education techniques to improve extremity awareness, maintain skin integrity and improve hand function   []Visual/Perceptual retraining  to improve body awareness and safety during transfers and ADLs   []Splinting/positioning needs to maintain joint/skin integrity and prevent contractures   [x]Therapeutic activity to improve functional performance during ADLs/IADLs  [x]Therapeutic exercise to improve tolerance and functional strength for ADLs/IADLs  [x]Balance retraining/tolerance tasks for facilitation of postural control with dynamic challenges during ADLs   [x]Neuromuscular re-education: facilitate righting/equilibrium reactions, midline orientation, scapular stability/mobility, normalize muscle tone, and facilitate active functional movement/attention  []Delirium prevention/treatment   []Positioning to improve functional independence and prevent skin breakdown   []Other:     Rehab Potential: Good for established goals.   Patient / Family Goal: Patient did not state a goal.  Patient and/or family were instructed on functional diagnosis, prognosis/goals, and OT plan of care. Demonstrated Fair understanding. Time In: 1212  Time Out: 1225  Total Treatment Time: 0 minutes    OT Re-Evaluation indicated secondary to patient now s/p exploratory laparotomy lysis of adhesions (2/26/2021). Minutes Units   OT Eval Low 80894     OT Eval Medium 82078     OT Eval High 51451     OT Re-Eval 13694 13 1   Therapeutic Ex 14339     Therapeutic Activities 28534     ADL/Self Care 28406     Orthotic Management 19513     Neuro Re-Ed 29114     Non-Billable Time N/A ---     Evaluation time includes thorough review of current medical information, gathering information on past medical history/social history and prior level of function, completion of standardized testing/informal observation of tasks, assessment of data, and education on plan of care and goals. Dinora Gonzales, OTR/L  License Number: [id number]

## 2021-02-28 LAB
ALBUMIN SERPL-MCNC: 3 G/DL (ref 3.5–5.2)
ALP BLD-CCNC: 64 U/L (ref 40–129)
ALT SERPL-CCNC: 13 U/L (ref 0–40)
ANION GAP SERPL CALCULATED.3IONS-SCNC: 6 MMOL/L (ref 7–16)
ANION GAP SERPL CALCULATED.3IONS-SCNC: 6 MMOL/L (ref 7–16)
ANION GAP SERPL CALCULATED.3IONS-SCNC: 7 MMOL/L (ref 7–16)
AST SERPL-CCNC: 17 U/L (ref 0–39)
BASOPHILS ABSOLUTE: 0.02 E9/L (ref 0–0.2)
BASOPHILS RELATIVE PERCENT: 0.2 % (ref 0–2)
BILIRUB SERPL-MCNC: 0.6 MG/DL (ref 0–1.2)
BUN BLDV-MCNC: 21 MG/DL (ref 8–23)
BUN BLDV-MCNC: 22 MG/DL (ref 8–23)
BUN BLDV-MCNC: 22 MG/DL (ref 8–23)
CALCIUM SERPL-MCNC: 8.5 MG/DL (ref 8.6–10.2)
CALCIUM SERPL-MCNC: 8.6 MG/DL (ref 8.6–10.2)
CALCIUM SERPL-MCNC: 8.7 MG/DL (ref 8.6–10.2)
CHLORIDE BLD-SCNC: 113 MMOL/L (ref 98–107)
CHLORIDE BLD-SCNC: 120 MMOL/L (ref 98–107)
CHLORIDE BLD-SCNC: 123 MMOL/L (ref 98–107)
CO2: 27 MMOL/L (ref 22–29)
CO2: 29 MMOL/L (ref 22–29)
CO2: 29 MMOL/L (ref 22–29)
CREAT SERPL-MCNC: 1.4 MG/DL (ref 0.7–1.2)
CREAT SERPL-MCNC: 1.4 MG/DL (ref 0.7–1.2)
CREAT SERPL-MCNC: 1.5 MG/DL (ref 0.7–1.2)
EOSINOPHILS ABSOLUTE: 0.12 E9/L (ref 0.05–0.5)
EOSINOPHILS RELATIVE PERCENT: 1.3 % (ref 0–6)
GFR AFRICAN AMERICAN: 54
GFR AFRICAN AMERICAN: 59
GFR AFRICAN AMERICAN: 59
GFR NON-AFRICAN AMERICAN: 54 ML/MIN/1.73
GFR NON-AFRICAN AMERICAN: 59 ML/MIN/1.73
GFR NON-AFRICAN AMERICAN: 59 ML/MIN/1.73
GLUCOSE BLD-MCNC: 257 MG/DL (ref 74–99)
GLUCOSE BLD-MCNC: 288 MG/DL (ref 74–99)
GLUCOSE BLD-MCNC: 880 MG/DL (ref 74–99)
HCT VFR BLD CALC: 37 % (ref 37–54)
HEMOGLOBIN: 12 G/DL (ref 12.5–16.5)
IMMATURE GRANULOCYTES #: 0.09 E9/L
IMMATURE GRANULOCYTES %: 1 % (ref 0–5)
LYMPHOCYTES ABSOLUTE: 1.29 E9/L (ref 1.5–4)
LYMPHOCYTES RELATIVE PERCENT: 13.7 % (ref 20–42)
MAGNESIUM: 2.8 MG/DL (ref 1.6–2.6)
MCH RBC QN AUTO: 33.6 PG (ref 26–35)
MCHC RBC AUTO-ENTMCNC: 32.4 % (ref 32–34.5)
MCV RBC AUTO: 103.6 FL (ref 80–99.9)
METER GLUCOSE: 196 MG/DL (ref 74–99)
METER GLUCOSE: 231 MG/DL (ref 74–99)
METER GLUCOSE: 262 MG/DL (ref 74–99)
METER GLUCOSE: 265 MG/DL (ref 74–99)
METER GLUCOSE: 265 MG/DL (ref 74–99)
METER GLUCOSE: 270 MG/DL (ref 74–99)
METER GLUCOSE: >500 MG/DL (ref 74–99)
MONOCYTES ABSOLUTE: 0.8 E9/L (ref 0.1–0.95)
MONOCYTES RELATIVE PERCENT: 8.5 % (ref 2–12)
NEUTROPHILS ABSOLUTE: 7.13 E9/L (ref 1.8–7.3)
NEUTROPHILS RELATIVE PERCENT: 75.3 % (ref 43–80)
PDW BLD-RTO: 14.4 FL (ref 11.5–15)
PHOSPHORUS: 2.5 MG/DL (ref 2.5–4.5)
PHOSPHORUS: 4.9 MG/DL (ref 2.5–4.5)
PLATELET # BLD: 108 E9/L (ref 130–450)
PMV BLD AUTO: 11.3 FL (ref 7–12)
POTASSIUM SERPL-SCNC: 4.1 MMOL/L (ref 3.5–5)
POTASSIUM SERPL-SCNC: 4.3 MMOL/L (ref 3.5–5)
POTASSIUM SERPL-SCNC: 7.2 MMOL/L (ref 3.5–5)
RBC # BLD: 3.57 E12/L (ref 3.8–5.8)
SODIUM BLD-SCNC: 147 MMOL/L (ref 132–146)
SODIUM BLD-SCNC: 155 MMOL/L (ref 132–146)
SODIUM BLD-SCNC: 158 MMOL/L (ref 132–146)
TOTAL PROTEIN: 5.6 G/DL (ref 6.4–8.3)
WBC # BLD: 9.5 E9/L (ref 4.5–11.5)

## 2021-02-28 PROCEDURE — 6360000002 HC RX W HCPCS: Performed by: SURGERY

## 2021-02-28 PROCEDURE — 6360000002 HC RX W HCPCS: Performed by: INTERNAL MEDICINE

## 2021-02-28 PROCEDURE — 6370000000 HC RX 637 (ALT 250 FOR IP): Performed by: STUDENT IN AN ORGANIZED HEALTH CARE EDUCATION/TRAINING PROGRAM

## 2021-02-28 PROCEDURE — 83735 ASSAY OF MAGNESIUM: CPT

## 2021-02-28 PROCEDURE — 6370000000 HC RX 637 (ALT 250 FOR IP): Performed by: INTERNAL MEDICINE

## 2021-02-28 PROCEDURE — 2500000003 HC RX 250 WO HCPCS: Performed by: FAMILY MEDICINE

## 2021-02-28 PROCEDURE — 2700000000 HC OXYGEN THERAPY PER DAY

## 2021-02-28 PROCEDURE — 2580000003 HC RX 258: Performed by: STUDENT IN AN ORGANIZED HEALTH CARE EDUCATION/TRAINING PROGRAM

## 2021-02-28 PROCEDURE — 36415 COLL VENOUS BLD VENIPUNCTURE: CPT

## 2021-02-28 PROCEDURE — 80048 BASIC METABOLIC PNL TOTAL CA: CPT

## 2021-02-28 PROCEDURE — 6360000002 HC RX W HCPCS: Performed by: STUDENT IN AN ORGANIZED HEALTH CARE EDUCATION/TRAINING PROGRAM

## 2021-02-28 PROCEDURE — 84100 ASSAY OF PHOSPHORUS: CPT

## 2021-02-28 PROCEDURE — 80053 COMPREHEN METABOLIC PANEL: CPT

## 2021-02-28 PROCEDURE — 2060000000 HC ICU INTERMEDIATE R&B

## 2021-02-28 PROCEDURE — 2500000003 HC RX 250 WO HCPCS: Performed by: SURGERY

## 2021-02-28 PROCEDURE — 82962 GLUCOSE BLOOD TEST: CPT

## 2021-02-28 PROCEDURE — 99232 SBSQ HOSP IP/OBS MODERATE 35: CPT | Performed by: INTERNAL MEDICINE

## 2021-02-28 PROCEDURE — 85025 COMPLETE CBC W/AUTO DIFF WBC: CPT

## 2021-02-28 PROCEDURE — 2580000003 HC RX 258: Performed by: INTERNAL MEDICINE

## 2021-02-28 PROCEDURE — 6370000000 HC RX 637 (ALT 250 FOR IP): Performed by: PSYCHIATRY & NEUROLOGY

## 2021-02-28 RX ADMIN — LABETALOL HYDROCHLORIDE 30 MG: 5 INJECTION INTRAVENOUS at 17:21

## 2021-02-28 RX ADMIN — PHENOL 1 SPRAY: 1.5 LIQUID ORAL at 17:43

## 2021-02-28 RX ADMIN — INSULIN LISPRO 6 UNITS: 100 INJECTION, SOLUTION INTRAVENOUS; SUBCUTANEOUS at 14:24

## 2021-02-28 RX ADMIN — ATORVASTATIN CALCIUM 10 MG: 10 TABLET, FILM COATED ORAL at 08:54

## 2021-02-28 RX ADMIN — SODIUM CHLORIDE, PRESERVATIVE FREE 10 ML: 5 INJECTION INTRAVENOUS at 17:34

## 2021-02-28 RX ADMIN — INSULIN LISPRO 6 UNITS: 100 INJECTION, SOLUTION INTRAVENOUS; SUBCUTANEOUS at 02:39

## 2021-02-28 RX ADMIN — LATANOPROST 1 DROP: 50 SOLUTION OPHTHALMIC at 20:06

## 2021-02-28 RX ADMIN — POTASSIUM CHLORIDE: 2 INJECTION, SOLUTION, CONCENTRATE INTRAVENOUS at 18:08

## 2021-02-28 RX ADMIN — LANSOPRAZOLE 30 MG: KIT at 08:53

## 2021-02-28 RX ADMIN — ASPIRIN 81 MG: 81 TABLET, CHEWABLE ORAL at 08:54

## 2021-02-28 RX ADMIN — Medication 300 UNITS: at 20:05

## 2021-02-28 RX ADMIN — BRIMONIDINE TARTRATE 1 DROP: 2 SOLUTION OPHTHALMIC at 17:19

## 2021-02-28 RX ADMIN — HYDROMORPHONE HYDROCHLORIDE 1 MG: 1 INJECTION, SOLUTION INTRAMUSCULAR; INTRAVENOUS; SUBCUTANEOUS at 20:02

## 2021-02-28 RX ADMIN — PHENOL 1 SPRAY: 1.5 LIQUID ORAL at 20:07

## 2021-02-28 RX ADMIN — SODIUM CHLORIDE, PRESERVATIVE FREE 10 ML: 5 INJECTION INTRAVENOUS at 20:04

## 2021-02-28 RX ADMIN — TIMOLOL MALEATE 1 DROP: 5 SOLUTION/ DROPS OPHTHALMIC at 20:06

## 2021-02-28 RX ADMIN — LABETALOL HYDROCHLORIDE 30 MG: 5 INJECTION INTRAVENOUS at 00:09

## 2021-02-28 RX ADMIN — INSULIN LISPRO 6 UNITS: 100 INJECTION, SOLUTION INTRAVENOUS; SUBCUTANEOUS at 22:23

## 2021-02-28 RX ADMIN — TRIMETHOBENZAMIDE HYDROCHLORIDE 200 MG: 100 INJECTION INTRAMUSCULAR at 02:31

## 2021-02-28 RX ADMIN — INSULIN LISPRO 2 UNITS: 100 INJECTION, SOLUTION INTRAVENOUS; SUBCUTANEOUS at 06:10

## 2021-02-28 RX ADMIN — TIMOLOL MALEATE 1 DROP: 5 SOLUTION/ DROPS OPHTHALMIC at 08:56

## 2021-02-28 RX ADMIN — INSULIN LISPRO 6 UNITS: 100 INJECTION, SOLUTION INTRAVENOUS; SUBCUTANEOUS at 11:05

## 2021-02-28 RX ADMIN — LABETALOL HYDROCHLORIDE 30 MG: 5 INJECTION INTRAVENOUS at 11:10

## 2021-02-28 RX ADMIN — LABETALOL HYDROCHLORIDE 30 MG: 5 INJECTION INTRAVENOUS at 06:06

## 2021-02-28 RX ADMIN — HYDROMORPHONE HYDROCHLORIDE 1 MG: 1 INJECTION, SOLUTION INTRAMUSCULAR; INTRAVENOUS; SUBCUTANEOUS at 02:31

## 2021-02-28 RX ADMIN — NITROGLYCERIN 0.5 INCH: 20 OINTMENT TOPICAL at 17:17

## 2021-02-28 RX ADMIN — ENOXAPARIN SODIUM 30 MG: 100 INJECTION SUBCUTANEOUS at 08:54

## 2021-02-28 RX ADMIN — Medication 300 UNITS: at 17:38

## 2021-02-28 RX ADMIN — INSULIN LISPRO 4 UNITS: 100 INJECTION, SOLUTION INTRAVENOUS; SUBCUTANEOUS at 17:22

## 2021-02-28 RX ADMIN — Medication 300 UNITS: at 08:56

## 2021-02-28 RX ADMIN — BRIMONIDINE TARTRATE 1 DROP: 2 SOLUTION OPHTHALMIC at 08:56

## 2021-02-28 RX ADMIN — SODIUM CHLORIDE, PRESERVATIVE FREE 10 ML: 5 INJECTION INTRAVENOUS at 08:54

## 2021-02-28 ASSESSMENT — PAIN SCALES - GENERAL
PAINLEVEL_OUTOF10: 7
PAINLEVEL_OUTOF10: 0
PAINLEVEL_OUTOF10: 0

## 2021-02-28 ASSESSMENT — PAIN DESCRIPTION - DESCRIPTORS: DESCRIPTORS: ACHING;SORE

## 2021-02-28 ASSESSMENT — PAIN DESCRIPTION - LOCATION: LOCATION: ABDOMEN

## 2021-02-28 ASSESSMENT — PAIN - FUNCTIONAL ASSESSMENT: PAIN_FUNCTIONAL_ASSESSMENT: PREVENTS OR INTERFERES SOME ACTIVE ACTIVITIES AND ADLS

## 2021-02-28 NOTE — PROGRESS NOTES
156/86   Pulse 94   Temp 98.4 °F (36.9 °C) (Oral)   Resp 16   Ht 5' 11\" (1.803 m)   Wt 197 lb 3.2 oz (89.4 kg)   SpO2 96%   BMI 27.50 kg/m²   General Appearance: alert and oriented to person, place and reoriented to time and in no acute distress  Skin: warm and dry  Head: normocephalic and atraumatic. Left Nares NGT  Eyes: pupils equal, round, and reactive to light, extraocular eye movements intact, conjunctivae normal  Neck: neck supple and non tender without mass   Pulmonary/Chest: clear to auscultation bilaterally- no wheezes, rales or rhonchi, normal air movement, no respiratory distress  Cardiovascular: normal rate, normal S1 and S2 and no no rubs, gallops, murmur noted. Abdomen: soft, non-tender, non-distended, normal bowel sounds, no rebound guarding rigidity noted. Dressing to mid abdomen CDI  Extremities: no cyanosis, no clubbing and no edema  Neurologic: no cranial nerve deficit and speech normal      Recent Labs     02/27/21  1323 02/27/21  1815 02/28/21  0610   * 158* 147*   K 4.3 4.1 7.2*   * 124* 113*   CO2 28 30* 27   BUN 21 20 21   CREATININE 1.5* 1.6* 1.4*   GLUCOSE 179* 227* 880*   CALCIUM 8.4* 8.4* 8.5*       Recent Labs     02/27/21  1323   ALKPHOS 58   PROT 5.6*   LABALBU 2.8*   BILITOT 0.7   AST 14   ALT 12       Recent Labs     02/25/21  1012 02/27/21  1323 02/28/21  0610   WBC 9.2 9.8 9.5   RBC 4.27 3.95 3.57*   HGB 13.6 12.7 12.0*   HCT 41.4 39.5 37.0   MCV 97.0 100.0* 103.6*   MCH 31.9 32.2 33.6   MCHC 32.9 32.2 32.4   RDW 14.1 14.5 14.4    120* 108*   MPV 10.4 11.0 11.3            Radiology:   US CAROTID ARTERY BILATERAL   Final Result   The right internal carotid artery demonstrates 50-69% stenosis. The left internal carotid artery demonstrates 70-99% stenosis. Bilateral vertebral arteries are patent with flow in the normal direction.       NM Cardiac Stress Test Nuclear Imaging   Final Result   The myocardial perfusion imaging demonstrates a fixed inferolateral   defect with corresponding wall motion abnormality consistent with a   prior myocardial infarction. No reversible ischemia is seen. The calculated ejection fraction 64%. However, visually the left   ventricle appears to be severely dilated with moderate global   hypokinesis and severe hypokinesis of the inferolateral wall. Consider   an echocardiogram if clinically indicated. MRI BRAIN W WO CONTRAST   Final Result   1. Questionable punctate 4 mm diameter focus of acute ischemia involving the   mid left frontal lobe deep white matter. 2. Moderate cerebral white matter chronic microvascular ischemic disease. 3. Mild diffuse cerebral atrophy. CT HEAD WO CONTRAST   Final Result   1. There is no acute intracranial abnormality. Specifically, there is no   intracranial hemorrhage. 2. Atrophy and periventricular leukomalacia,   3. Given the symptoms of visual loss an asses of any infarct within the   occipital lobes or suprasellar mass dedicated MRI of the brain is recommended   for further evaluation. XR ABDOMEN FOR NG/OG/NE TUBE PLACEMENT   Final Result   Satisfactory position of nasogastric tube following advancement. XR ABDOMEN (KUB) (SINGLE AP VIEW)   Final Result   The tip of the NG tube is at the level of the diaphragm. Please advance the   NG tube by an additional 10 cm   Multiple dilated loops of small bowel consistent with small bowel obstruction. CT ABDOMEN PELVIS WO CONTRAST Additional Contrast? Oral   Final Result   Persistent high-grade small-bowel obstruction with significantly dilated   proximal small bowel loops with the transition point in the right lower   quadrant with the swirling appearance in the mesentery. Mesenteric or   midgrade volvulus or adhesions/stricture is considered. Decompression is   recommended. XR ABDOMEN FOR NG/OG/NE TUBE PLACEMENT   Final Result   Satisfactory position of enteric tube.       CT ABDOMEN PELVIS WO CONTRAST Adjust as needed. 8.  HTN-Much improved S/P Surgery. Labetalol 30 mg IV every 6 hours initiated. Follow closely adjust as needed. 9. ?Left Frontal Lobe ischemia-Acute-subacute ischemic stroke left middle cerebral artery territory likely lacunar infarction due to small vessel disease. Continue ASA/statin therapy. Carotid US Right internal artery 50-69%. Left internal Artery 70-99%. Bilateral vertebral arteries are patent with flow in normal direction. If Delay in carotid intervention, Plavix will need added to ASA therapy. Per Neurology. Neurology/vascular Surgery input appreciated. 10. Left eye vision loss-glaucoma specialist in [facility] for follow-up as outpatient. Likely loss of vision 2/2 vascular disease per ophthalmology. 11. Bilateral Carotid Stenosis- Carotid US Right internal artery 50-69%. Left internal Artery 70-99%. Bilateral vertebral arteries are patent with flow in normal direction. Dual Platelet therapy recommended. No immediate plans for Carotid endarterectomy per Vascular Surgery. Vascular surgery input appreciated. NOTE: This report was transcribed using voice recognition software. Every effort was made to ensure accuracy; however, inadvertent computerized transcription errors may be present. Electronically signed by Mignon Thomas CNP on 2/28/2021 at 7:40 AM    HOSPITALIST ATTENDING PHYSICIAN NOTE 2/28/2021 2116PM:    Details of the evaluation - subjective assessment (including medication profile, past medical, family and social history when applicable), examination, review of lab and test data, diagnostic impressions and medical decision making - performed by Flaquito Coffey. Aaron Thomas CNP, were discussed with me on the date of service and I agree with clinical information herein unless otherwise noted. The patient has been evaluated by me personally earlier today.   Pt reports no fevers, chills,n/v.     Exam: heart reg at rate of 96,lungs cta, abd pos bs soft nt, ext neg for le edema    I agree with the assessment and plan of [place] Troy - CNP. sbo  Hypernatremia  cva likely lacunar infarction  itz  Dm type 2 controlled  htn  Left eye vision loss  hypophosphatemia  Hypokalemia  thrombocytopenia      Electronically signed by Goble Bumpers, D.O.   Hospitalist  [facility] Hospitalist [facility] at [facility]

## 2021-02-28 NOTE — PROGRESS NOTES
Singleton catheter removed per order. Patient tolerated procedure well. Patient is due to void [phone].

## 2021-02-28 NOTE — PROGRESS NOTES
Nephrology Note  Suzi Du MD          Patient seen and examined. No family member is present at bedside. Chart reviewed. Patient is somnolent. He easily wakes up and responds appropriately to simple commands. In no acute distress. .   In no acute distress. Vital SignsBP (!) 168/86 Comment: manual  Pulse 90   Temp 99.6 °F (37.6 °C) (Axillary)   Resp 16   Ht 5' 11\" (1.803 m)   Wt 197 lb 3.2 oz (89.4 kg)   SpO2 96%   BMI 27.50 kg/m²   24HR INTAKE/OUTPUT:    Intake/Output Summary (Last 24 hours) at 2/28/2021 1520  Last data filed at 2/28/2021 1336  Gross per 24 hour   Intake 13 ml   Output 1450 ml   Net -1437 ml         Physical Exam    HEENT :NG tube in place, poor oral dental hygiene  Neck: No JVD  Lungs: Breath sounds decreased at the bases. No rales or ronchi. Heart: Regular rate and rhythm. No S3 gallop. No  murmrur. Abdomen: Soft, distended, incisional tenderness,  and normal bowel sounds.   Extremeties: Trace bipedal edema    Current Facility-Administered Medications   Medication Dose Route Frequency Provider Last Rate Last Admin    PN-Adult  3 IN 1 Central Line (Standard)   Intravenous Continuous TPN Aruna Parnell MD        bisacodyl (DULCOLAX) suppository 10 mg  10 mg Rectal Daily PRN Markus Motley MD        PN-Adult  3 IN 1 Central Line (Standard)   Intravenous Continuous TPN Markus Motley MD 75 mL/hr at 02/27/21 1818 New Bag at 02/27/21 1818    sodium chloride flush 0.9 % injection 10 mL  10 mL Intravenous PRN Kadi Silva MD        heparin flush 100 UNIT/ML injection 300 Units  3 mL Intravenous 2 times per day Kadi Silva MD   300 Units at 02/28/21 0856    heparin flush 100 UNIT/ML injection 300 Units  3 mL Intracatheter PRN Kadi Silva MD        oxyCODONE (ROXICODONE) immediate release tablet 5 mg  5 mg Oral Q4H PRN Jay Best MD        Or   Mejia Trejoo oxyCODONE (ROXICODONE) immediate release tablet 10 mg  10 mg Oral Q4H PRN Jay Best MD        dextrose 10 % infusion   Intravenous Continuous Scott Hric, DO 75 mL/hr at 02/27/21 0846 New Bag at 02/27/21 0846    HYDROmorphone (DILAUDID) injection 0.5 mg  0.5 mg Intravenous Q4H PRN Jennyfer Fuentes MD   0.5 mg at 02/26/21 2339    Or    HYDROmorphone (DILAUDID) injection 1 mg  1 mg Intravenous Q4H PRN Jennyfer Fuentes MD   1 mg at 02/28/21 0231    aspirin chewable tablet 81 mg  81 mg Oral Daily Joel Meléndez, DO   81 mg at 02/28/21 0854    hydrALAZINE (APRESOLINE) injection 20 mg  20 mg Intravenous Q6H PRN Jeronimo Craven MD   20 mg at 02/26/21 2226    lansoprazole suspension SUSP 30 mg  30 mg Per NG tube Daily Aleta Godwin MD   30 mg at 02/28/21 0853    labetalol (NORMODYNE;TRANDATE) injection 30 mg  30 mg Intravenous Q6H Yamileth Patel, DO   30 mg at 02/28/21 1110    morphine (PF) injection 1 mg  1 mg Intravenous Q4H PRN Karla Foster MD   1 mg at 02/27/21 0222    ceFAZolin (ANCEF) 2000 mg in sterile water 20 mL IV syringe  2,000 mg Intravenous See Admin Instructions Aleta Godwin MD   2 g at 02/26/21 1630    atorvastatin (LIPITOR) tablet 10 mg  10 mg Oral Daily Karla Foster MD   10 mg at 02/28/21 0854    phenol 1.4 % mouth spray 1 spray  1 spray Mouth/Throat Q2H PRN Karla Foster MD   1 spray at 02/24/21 0437    trimethobenzamide (TIGAN) injection 200 mg  200 mg Intramuscular Q6H PRN Karla Foster MD   200 mg at 02/28/21 0231    brimonidine (ALPHAGAN) 0.2 % ophthalmic solution 1 drop  1 drop Left Eye BID Janette Hidalgo MD   1 drop at 02/28/21 0856    ipratropium-albuterol (DUONEB) nebulizer solution 3 mL  3 mL Inhalation Q6H PRN Janette Hidalgo MD        latanoprost (XALATAN) 0.005 % ophthalmic solution 1 drop  1 drop Left Eye Nightly Janette Hidalgo MD   1 drop at 02/27/21 2058    nitroGLYCERIN (NITROSTAT) SL tablet 0.4 mg  0.4 mg Sublingual Q5 Min PRN Janette Hidalgo MD        timolol (TIMOPTIC) 0.5 % ophthalmic solution 1 drop  1 drop Left Eye BID Janette Hidalgo MD   1 drop at 02/28/21 0861    sodium chloride flush 0.9 % injection 10 mL  10 mL Intravenous 2 times per day Jillian Munson MD   10 mL at 02/28/21 0854    sodium chloride flush 0.9 % injection 10 mL  10 mL Intravenous PRN Janette Hidalgo MD   10 mL at 02/23/21 1527    enoxaparin (LOVENOX) injection 30 mg  30 mg Subcutaneous Daily Janette Hidalgo MD   30 mg at 02/28/21 0854    polyethylene glycol (GLYCOLAX) packet 17 g  17 g Oral Daily PRN Janette Hidalgo MD        acetaminophen (TYLENOL) tablet 650 mg  650 mg Oral Q6H PRN Janette Hidalgo MD   650 mg at 02/21/21 1539    Or    acetaminophen (TYLENOL) suppository 650 mg  650 mg Rectal Q6H PRN Janette Hidalgo MD        glucose (GLUTOSE) 40 % oral gel 15 g  15 g Oral PRN Janette Hidalgo MD        dextrose 50 % IV solution  12.5 g Intravenous PRN Janette Hidalgo MD        glucagon (rDNA) injection 1 mg  1 mg Intramuscular PRN Janette Hidalgo MD        dextrose 5 % solution  100 mL/hr Intravenous PRN Janette Hidalgo MD        insulin lispro (HUMALOG) injection vial 0-12 Units  0-12 Units Subcutaneous Q4H Janette Hidalgo MD   6 Units at 02/28/21 1424    influenza A&B vaccine (FLUAD QUADRIVALENT) injection 0.5 mL  0.5 mL Intramuscular Prior to discharge Deandre Marie MD           [facility]   Final Result   The right internal carotid artery demonstrates 50-69% stenosis. The left internal carotid artery demonstrates 70-99% stenosis. Bilateral vertebral arteries are patent with flow in the normal direction. NM Cardiac Stress Test Nuclear Imaging   Final Result   The myocardial perfusion imaging demonstrates a fixed inferolateral   defect with corresponding wall motion abnormality consistent with a   prior myocardial infarction. No reversible ischemia is seen. The calculated ejection fraction 64%. However, visually the left   ventricle appears to be severely dilated with moderate global   hypokinesis and severe hypokinesis of the inferolateral wall.  Consider   an echocardiogram if clinically indicated. MRI BRAIN W WO CONTRAST   Final Result   1. Questionable punctate 4 mm diameter focus of acute ischemia involving the   mid left frontal lobe deep white matter. 2. Moderate cerebral white matter chronic microvascular ischemic disease. 3. Mild diffuse cerebral atrophy. CT HEAD WO CONTRAST   Final Result   1. There is no acute intracranial abnormality. Specifically, there is no   intracranial hemorrhage. 2. Atrophy and periventricular leukomalacia,   3. Given the symptoms of visual loss an asses of any infarct within the   occipital lobes or suprasellar mass dedicated MRI of the brain is recommended   for further evaluation. XR ABDOMEN FOR NG/OG/NE TUBE PLACEMENT   Final Result   Satisfactory position of nasogastric tube following advancement. XR ABDOMEN (KUB) (SINGLE AP VIEW)   Final Result   The tip of the NG tube is at the level of the diaphragm. Please advance the   NG tube by an additional 10 cm   Multiple dilated loops of small bowel consistent with small bowel obstruction. CT ABDOMEN PELVIS WO CONTRAST Additional Contrast? Oral   Final Result   Persistent high-grade small-bowel obstruction with significantly dilated   proximal small bowel loops with the transition point in the right lower   quadrant with the swirling appearance in the mesentery. Mesenteric or   midgrade volvulus or adhesions/stricture is considered. Decompression is   recommended. XR ABDOMEN FOR NG/OG/NE TUBE PLACEMENT   Final Result   Satisfactory position of enteric tube. CT ABDOMEN PELVIS WO CONTRAST Additional Contrast? None   Final Result   Dilatation of multiple segments of small bowel throughout the abdomen and   pelvis with air-fluid levels consistent with small bowel obstruction. 2 areas of transition are seen in the right mid abdomen.    A transition is seen at a site of rotatory configuration of small bowel and   mesenteric vessels with narrowing of small bowel suggestive of

## 2021-02-28 NOTE — PROGRESS NOTES
P Quality Flow/Interdisciplinary Rounds Progress Note        Quality Flow Rounds held on February 28, 2021    Disciplines Attending:  Bedside Nurse,  and     Eladio Menezes was admitted on 2/20/2021  5:15 PM    Anticipated Discharge Date:  Expected Discharge Date: 03/02/21    Disposition:    Justo Score:  Justo Scale Score: 14    Readmission Risk              Risk of Unplanned Readmission:        25           Discussed patient goal for the day, patient clinical progression, and barriers to discharge. The following Goal(s) of the Day/Commitment(s) have been identified:  monitor labs & NG output.       Anibal Haynes  February 28, 2021

## 2021-02-28 NOTE — PROGRESS NOTES
Attending Physician Progress Note     Current Meds:      PN-Adult  3 IN 1 Central Line (Standard), Continuous TPN      bisacodyl (DULCOLAX) suppository 10 mg, Daily PRN      PN-Adult  3 IN 1 Central Line (Standard), Continuous TPN      sodium chloride flush 0.9 % injection 10 mL, PRN      heparin flush 100 UNIT/ML injection 300 Units, 2 times per day      heparin flush 100 UNIT/ML injection 300 Units, PRN      oxyCODONE (ROXICODONE) immediate release tablet 5 mg, Q4H PRN    Or      oxyCODONE (ROXICODONE) immediate release tablet 10 mg, Q4H PRN      dextrose 10 % infusion, Continuous      HYDROmorphone (DILAUDID) injection 0.5 mg, Q4H PRN    Or      HYDROmorphone (DILAUDID) injection 1 mg, Q4H PRN      aspirin chewable tablet 81 mg, Daily      hydrALAZINE (APRESOLINE) injection 20 mg, Q6H PRN      lansoprazole suspension SUSP 30 mg, Daily      labetalol (NORMODYNE;TRANDATE) injection 30 mg, Q6H      morphine (PF) injection 1 mg, Q4H PRN      ceFAZolin (ANCEF) 2000 mg in sterile water 20 mL IV syringe, See Admin Instructions      atorvastatin (LIPITOR) tablet 10 mg, Daily      phenol 1.4 % mouth spray 1 spray, Q2H PRN      trimethobenzamide (TIGAN) injection 200 mg, Q6H PRN      brimonidine (ALPHAGAN) 0.2 % ophthalmic solution 1 drop, BID      ipratropium-albuterol (DUONEB) nebulizer solution 3 mL, Q6H PRN      latanoprost (XALATAN) 0.005 % ophthalmic solution 1 drop, Nightly      nitroGLYCERIN (NITROSTAT) SL tablet 0.4 mg, Q5 Min PRN      timolol (TIMOPTIC) 0.5 % ophthalmic solution 1 drop, BID      sodium chloride flush 0.9 % injection 10 mL, 2 times per day      sodium chloride flush 0.9 % injection 10 mL, PRN      enoxaparin (LOVENOX) injection 30 mg, Daily      polyethylene glycol (GLYCOLAX) packet 17 g, Daily PRN      acetaminophen (TYLENOL) tablet 650 mg, Q6H PRN    Or      acetaminophen (TYLENOL) suppository 650 mg, Q6H PRN      glucose (GLUTOSE) 40 % oral gel 15 g, PRN     dextrose 50 % IV solution, PRN      glucagon (rDNA) injection 1 mg, PRN      dextrose 5 % solution, PRN      insulin lispro (HUMALOG) injection vial 0-12 Units, Q4H      influenza A&B vaccine (FLUAD QUADRIVALENT) injection 0.5 mL, Prior to discharge         Vitals/Labs:    Patient Vitals for the past 24 hrs:   BP Temp Temp src Pulse Resp SpO2 Weight   02/28/21 1100 (!) 168/86 -- -- 90 -- -- --   02/28/21 0846 (!) 168/78 99.6 °F (37.6 °C) Axillary 96 16 96 % --   02/28/21 0606 (!) 156/86 98.4 °F (36.9 °C) Oral 94 16 96 % --   02/28/21 0030 -- -- -- -- -- -- 197 lb 3.2 oz (89.4 kg)   02/28/21 0002 (!) 192/86 -- -- 98 23 -- --   02/27/21 2043 (!) 170/78 97.9 °F (36.6 °C) Oral 94 16 95 % --   02/27/21 1725 (!) 188/72 -- -- 104 18 -- --        I/O last 3 completed shifts: In: 13 [I.V.:13]  Out: 1450 [Urine:1250; Emesis/NG output:200]  No intake/output data recorded. Recent Labs     02/27/21  1323 02/28/21  0610   WBC 9.8 9.5   HGB 12.7 12.0*   HCT 39.5 37.0   * 108*     Recent Labs     02/27/21  1815 02/28/21  0610 02/28/21  0756   CREATININE 1.6* 1.4* 1.5*   BUN 20 21 22   * 147* 158*   K 4.1 7.2* 4.3   * 113* 123*   CO2 30* 27 29     Recent Labs     02/27/21  1323 02/28/21  0756   AST 14 17   ALT 12 13   BILITOT 0.7 0.6   ALKPHOS 58 64     No results for input(s): LIPASE, AMYLASE in the last 72 hours. No results for input(s): LACTATE in the last 72 hours. No results for input(s): INR, PTT in the last 72 hours. Invalid input(s): PT    Seen/examined for Dr. Nury Sheldon   patient is feeling okay  Denies any abdominal pain  Denies flatus or stool    Physical Exam:    Abdomen:    softly distended.    appropriate tenderness without guarding    Incision:   Intact some dried blood, no erythema    Impression:  Postoperative day #2 laparotomy with lysis of adhesions  Ileus  Hypernatremia    Plan:  Continue intravenous nutritional support, sodium content adjusted  Await return of bowel function  Continue nasogastric decompression      Electronically by Lizette Velez MD on 2/28/2021 at 12:02 PM

## 2021-03-01 LAB
ANION GAP SERPL CALCULATED.3IONS-SCNC: 5 MMOL/L (ref 7–16)
BASOPHILS ABSOLUTE: 0.02 E9/L (ref 0–0.2)
BASOPHILS RELATIVE PERCENT: 0.2 % (ref 0–2)
BUN BLDV-MCNC: 23 MG/DL (ref 8–23)
CALCIUM SERPL-MCNC: 8.9 MG/DL (ref 8.6–10.2)
CHLORIDE BLD-SCNC: 122 MMOL/L (ref 98–107)
CO2: 30 MMOL/L (ref 22–29)
CREAT SERPL-MCNC: 1.4 MG/DL (ref 0.7–1.2)
EOSINOPHILS ABSOLUTE: 0.17 E9/L (ref 0.05–0.5)
EOSINOPHILS RELATIVE PERCENT: 1.6 % (ref 0–6)
GFR AFRICAN AMERICAN: 59
GFR NON-AFRICAN AMERICAN: 59 ML/MIN/1.73
GLUCOSE BLD-MCNC: 192 MG/DL (ref 74–99)
HCT VFR BLD CALC: 40.1 % (ref 37–54)
HEMOGLOBIN: 12.5 G/DL (ref 12.5–16.5)
IMMATURE GRANULOCYTES #: 0.07 E9/L
IMMATURE GRANULOCYTES %: 0.7 % (ref 0–5)
LYMPHOCYTES ABSOLUTE: 1.2 E9/L (ref 1.5–4)
LYMPHOCYTES RELATIVE PERCENT: 11.2 % (ref 20–42)
MCH RBC QN AUTO: 31.6 PG (ref 26–35)
MCHC RBC AUTO-ENTMCNC: 31.2 % (ref 32–34.5)
MCV RBC AUTO: 101.3 FL (ref 80–99.9)
METER GLUCOSE: 163 MG/DL (ref 74–99)
METER GLUCOSE: 216 MG/DL (ref 74–99)
METER GLUCOSE: 241 MG/DL (ref 74–99)
METER GLUCOSE: 255 MG/DL (ref 74–99)
METER GLUCOSE: 327 MG/DL (ref 74–99)
METER GLUCOSE: 333 MG/DL (ref 74–99)
MONOCYTES ABSOLUTE: 0.8 E9/L (ref 0.1–0.95)
MONOCYTES RELATIVE PERCENT: 7.5 % (ref 2–12)
NEUTROPHILS ABSOLUTE: 8.44 E9/L (ref 1.8–7.3)
NEUTROPHILS RELATIVE PERCENT: 78.8 % (ref 43–80)
PDW BLD-RTO: 13.8 FL (ref 11.5–15)
PLATELET # BLD: 102 E9/L (ref 130–450)
PMV BLD AUTO: 11.3 FL (ref 7–12)
POTASSIUM SERPL-SCNC: 4.2 MMOL/L (ref 3.5–5)
RBC # BLD: 3.96 E12/L (ref 3.8–5.8)
SODIUM BLD-SCNC: 157 MMOL/L (ref 132–146)
WBC # BLD: 10.7 E9/L (ref 4.5–11.5)

## 2021-03-01 PROCEDURE — 80048 BASIC METABOLIC PNL TOTAL CA: CPT

## 2021-03-01 PROCEDURE — 2500000003 HC RX 250 WO HCPCS: Performed by: STUDENT IN AN ORGANIZED HEALTH CARE EDUCATION/TRAINING PROGRAM

## 2021-03-01 PROCEDURE — 85025 COMPLETE CBC W/AUTO DIFF WBC: CPT

## 2021-03-01 PROCEDURE — 6370000000 HC RX 637 (ALT 250 FOR IP): Performed by: INTERNAL MEDICINE

## 2021-03-01 PROCEDURE — 6360000002 HC RX W HCPCS: Performed by: INTERNAL MEDICINE

## 2021-03-01 PROCEDURE — 6360000002 HC RX W HCPCS: Performed by: STUDENT IN AN ORGANIZED HEALTH CARE EDUCATION/TRAINING PROGRAM

## 2021-03-01 PROCEDURE — 99232 SBSQ HOSP IP/OBS MODERATE 35: CPT | Performed by: INTERNAL MEDICINE

## 2021-03-01 PROCEDURE — 97535 SELF CARE MNGMENT TRAINING: CPT

## 2021-03-01 PROCEDURE — 2060000000 HC ICU INTERMEDIATE R&B

## 2021-03-01 PROCEDURE — 82962 GLUCOSE BLOOD TEST: CPT

## 2021-03-01 PROCEDURE — 2500000003 HC RX 250 WO HCPCS: Performed by: FAMILY MEDICINE

## 2021-03-01 PROCEDURE — 97530 THERAPEUTIC ACTIVITIES: CPT

## 2021-03-01 PROCEDURE — 36415 COLL VENOUS BLD VENIPUNCTURE: CPT

## 2021-03-01 PROCEDURE — 2700000000 HC OXYGEN THERAPY PER DAY

## 2021-03-01 PROCEDURE — 2580000003 HC RX 258: Performed by: INTERNAL MEDICINE

## 2021-03-01 PROCEDURE — 6370000000 HC RX 637 (ALT 250 FOR IP): Performed by: PSYCHIATRY & NEUROLOGY

## 2021-03-01 PROCEDURE — 6370000000 HC RX 637 (ALT 250 FOR IP): Performed by: STUDENT IN AN ORGANIZED HEALTH CARE EDUCATION/TRAINING PROGRAM

## 2021-03-01 RX ADMIN — PHENOL 1 SPRAY: 1.5 LIQUID ORAL at 05:52

## 2021-03-01 RX ADMIN — BRIMONIDINE TARTRATE 1 DROP: 2 SOLUTION OPHTHALMIC at 18:15

## 2021-03-01 RX ADMIN — ATORVASTATIN CALCIUM 10 MG: 10 TABLET, FILM COATED ORAL at 09:48

## 2021-03-01 RX ADMIN — OXYCODONE HYDROCHLORIDE 5 MG: 5 TABLET ORAL at 09:52

## 2021-03-01 RX ADMIN — LATANOPROST 1 DROP: 50 SOLUTION OPHTHALMIC at 20:45

## 2021-03-01 RX ADMIN — INSULIN LISPRO 4 UNITS: 100 INJECTION, SOLUTION INTRAVENOUS; SUBCUTANEOUS at 18:49

## 2021-03-01 RX ADMIN — LANSOPRAZOLE 30 MG: KIT at 09:48

## 2021-03-01 RX ADMIN — LABETALOL HYDROCHLORIDE 30 MG: 5 INJECTION INTRAVENOUS at 18:57

## 2021-03-01 RX ADMIN — ENOXAPARIN SODIUM 30 MG: 100 INJECTION SUBCUTANEOUS at 10:07

## 2021-03-01 RX ADMIN — INSULIN LISPRO 6 UNITS: 100 INJECTION, SOLUTION INTRAVENOUS; SUBCUTANEOUS at 02:19

## 2021-03-01 RX ADMIN — NITROGLYCERIN 0.5 INCH: 20 OINTMENT TOPICAL at 13:09

## 2021-03-01 RX ADMIN — Medication 300 UNITS: at 10:12

## 2021-03-01 RX ADMIN — POTASSIUM CHLORIDE: 2 INJECTION, SOLUTION, CONCENTRATE INTRAVENOUS at 18:06

## 2021-03-01 RX ADMIN — LABETALOL HYDROCHLORIDE 30 MG: 5 INJECTION INTRAVENOUS at 05:44

## 2021-03-01 RX ADMIN — INSULIN LISPRO 8 UNITS: 100 INJECTION, SOLUTION INTRAVENOUS; SUBCUTANEOUS at 14:11

## 2021-03-01 RX ADMIN — HYDRALAZINE HYDROCHLORIDE 20 MG: 20 INJECTION INTRAMUSCULAR; INTRAVENOUS at 15:58

## 2021-03-01 RX ADMIN — BRIMONIDINE TARTRATE 1 DROP: 2 SOLUTION OPHTHALMIC at 10:06

## 2021-03-01 RX ADMIN — INSULIN LISPRO 2 UNITS: 100 INJECTION, SOLUTION INTRAVENOUS; SUBCUTANEOUS at 05:48

## 2021-03-01 RX ADMIN — TIMOLOL MALEATE 1 DROP: 5 SOLUTION/ DROPS OPHTHALMIC at 20:45

## 2021-03-01 RX ADMIN — SODIUM CHLORIDE, PRESERVATIVE FREE 10 ML: 5 INJECTION INTRAVENOUS at 20:45

## 2021-03-01 RX ADMIN — SODIUM CHLORIDE, PRESERVATIVE FREE 10 ML: 5 INJECTION INTRAVENOUS at 10:09

## 2021-03-01 RX ADMIN — LABETALOL HYDROCHLORIDE 30 MG: 5 INJECTION INTRAVENOUS at 13:35

## 2021-03-01 RX ADMIN — ASPIRIN 81 MG: 81 TABLET, CHEWABLE ORAL at 09:48

## 2021-03-01 RX ADMIN — NITROGLYCERIN 0.5 INCH: 20 OINTMENT TOPICAL at 18:57

## 2021-03-01 RX ADMIN — INSULIN LISPRO 8 UNITS: 100 INJECTION, SOLUTION INTRAVENOUS; SUBCUTANEOUS at 10:22

## 2021-03-01 RX ADMIN — Medication 300 UNITS: at 20:45

## 2021-03-01 RX ADMIN — NITROGLYCERIN 0.5 INCH: 20 OINTMENT TOPICAL at 05:48

## 2021-03-01 RX ADMIN — NITROGLYCERIN 0.5 INCH: 20 OINTMENT TOPICAL at 00:27

## 2021-03-01 RX ADMIN — INSULIN LISPRO 4 UNITS: 100 INJECTION, SOLUTION INTRAVENOUS; SUBCUTANEOUS at 22:54

## 2021-03-01 RX ADMIN — LABETALOL HYDROCHLORIDE 30 MG: 5 INJECTION INTRAVENOUS at 00:27

## 2021-03-01 RX ADMIN — TIMOLOL MALEATE 1 DROP: 5 SOLUTION/ DROPS OPHTHALMIC at 10:07

## 2021-03-01 RX ADMIN — SODIUM CHLORIDE, PRESERVATIVE FREE 10 ML: 5 INJECTION INTRAVENOUS at 15:58

## 2021-03-01 RX ADMIN — SODIUM CHLORIDE, PRESERVATIVE FREE 10 ML: 5 INJECTION INTRAVENOUS at 13:35

## 2021-03-01 ASSESSMENT — PAIN SCALES - GENERAL
PAINLEVEL_OUTOF10: 0
PAINLEVEL_OUTOF10: 6

## 2021-03-01 ASSESSMENT — PAIN DESCRIPTION - PAIN TYPE: TYPE: SURGICAL PAIN

## 2021-03-01 ASSESSMENT — PAIN DESCRIPTION - LOCATION: LOCATION: ABDOMEN

## 2021-03-01 NOTE — PROGRESS NOTES
P Quality Flow/Interdisciplinary Rounds Progress Note        Quality Flow Rounds held on March 1, 2021    Disciplines Attending:  Bedside Nurse,  and     Alejandro Trimble was admitted on 2/20/2021  5:15 PM    Anticipated Discharge Date:  Expected Discharge Date: 03/02/21    Disposition:    Justo Score:  Justo Scale Score: 16    Readmission Risk              Risk of Unplanned Readmission:        24           Discussed patient goal for the day, patient clinical progression, and barriers to discharge. The following Goal(s) of the Day/Commitment(s) have been identified:  monitor labs.       Alfredito Garland  March 1, 2021

## 2021-03-01 NOTE — PROGRESS NOTES
Occupational Therapy  OT BEDSIDE TREATMENT NOTE      Date:3/1/2021  Patient Name: Tk Clark  MRN: [id number]  : 1940  Room: [address] [place] [address]     Referring Provider: Mario Almodovar MD   Evaluating OT: Armando Herrera OTR/L [id number]  Re-Evaluating OT: Starla Posadas, OTR/L - [id number]     AM-PAC Daily Activity Raw Score:      Recommended Adaptive Equipment: Continue to assess.     Diagnosis: Intestinal obstruction ([address] [K56.609]   Surgery: Patient underwent exploratory laparotomy lysis of adhesions on 2021. Pertinent Medical History: CAD, CKD, DM, HTN, TIA, CHF      Precautions: falls, NG tube, O2, visual impairment (legally blind), abdominal      Home Living: Patient lives alone in a two-floor home; patient's bedroom and bathroom are on the second floor. Bathroom Setup: tub shower with standard-height toilet     Prior Level of Function (PLOF): Patient noted that he had been independent with ADLs, but had needed assistance with most IADLs. Patient had been independent with functional mobility (without device) prior to this hospitalization. Driving: No - family assists     Pain Level: Pt reported pain 8/10. Nursing present and medicated pt during session. Cognition: Patient alert and grossly oriented. Cues for safety.      Functional Assessment:    Initial Eval Status:  Date: 2021 Re-Eval Status  Date: 2021 Treatment Status  Updated Short Term Goals   Feeding Min A N/A - NPO  NG tube to suction. NG clamped. Pt drinking from cup. Assist due to visual deficits.   Setup  (when appropriate)   Grooming Min A Mod A Min A wash face.   SBA  (seated/standing at sink)   UB Dressing Min A Mod A Mod A to don gown as a robe.   SBA   LB Dressing Max A Max A  max A don slipper socks.   Min A - with use of AE, as needed/appropriate   Bathing Mod A Max A Max A LB bathe and malgorzata hygiene.   Min A - with use of AE/DME, as needed/appropriate   Toileting Mod A Max A  Patient with esquivel catheter currently. Incontinent of urine. Max A don brief. Max A toilet hygiene.   Min A   Bed Mobility  Supine-to-Sit: Mod A Supine-to-Sit: Mod Ax2  Sit-to-Supine: Mod Ax2  Mod A supine to sit       Functional Transfers Sit-to-Stand: Min A Sit-to-Stand: Mod Ax2   from EOB Mod A transfer from bed.   CGA   Functional Mobility Min A (with walker) small steps to arm chair; limited further d/t reported weakness/fatigue Mod Ax2 for few small side steps toward [facility]   (with walker)  mod A pivot bed to recliner chair. Constant verbal cues due to poor vision. CGA with functional mobility (with device, as needed/appropriate) in order to maximize independence with ADLs/IADLs and other functional tasks. Balance Sitting: Fair     Standing: Fair-  (with walker) Sitting: Fair  (at EOB)  Standing: Poor+  (with walker) Static stand min A while receiving assistance with ADL. Sit balance on the EOB CGA for safety.   Fair dynamic standing balance during completion of ADLs/IADLs and other functional tasks. Activity Tolerance Poor  3L O2 throughout session. Limited  fair -  Patient will demonstrate Good understanding and consistent implementation of energy conservation techniques and work simplification techniques into ADL/IADL routines. Visual/  Perceptual Legally blind - patient reports primarily black Impaired  Patient is legally blind.      N/A   B UE Strength 4-/5 4-/5   Patient will demonstrate 4+/5 B UE strength in order to maximize independence with ADLs/IADLs and functional transfers.        Comments:  Pt very pleasant and cooperative. Increased time taken for ADL and transfers due to pt only seeing shadows from L eye per his report. Recliner chair placed in room for safety and comfort when OOB. Pt reports comfort when sitting upright in the chair. Alarm activated. Nursing present. Education/treatment:  ADL retraining with facilitation of movement to increase self care.   Therapeutic activity to address balance, strength, and endurance for ADL and transfers. Pt education of daily orientation and transfer safety. · Pt has made  progress towards set goals. Updated Plan of Care: 2-5 days/week for 1-2 weeks PRN  [x]? ?ADL retraining/adaptive techniques and AE recommendations to increase functional independence within precautions  [x]? ? Energy conservation techniques to improve tolerance for ADLs/IADLs  [x]? ? Functional transfer/mobility training/DME recommendations for increased independence, safety and fall prevention  [x]? ?Patient/family education to increase safety and functional independence  [x]? ? Environmental modifications for safe mobility and completion of ADLs/IADLs  []? ?Cognitive retraining exercises to improve problem solving skills & safe participation in ADLs/IADLs   []? ?Sensory re-education techniques to improve extremity awareness, maintain skin integrity and improve hand function   []? ? Visual/Perceptual retraining  to improve body awareness and safety during transfers and ADLs   []? ? Splinting/positioning needs to maintain joint/skin integrity and prevent contractures   [x]? ? Therapeutic activity to improve functional performance during ADLs/IADLs  [x]? ? Therapeutic exercise to improve tolerance and functional strength for ADLs/IADLs  [x]? ? Balance retraining/tolerance tasks for facilitation of postural control with dynamic challenges during ADLs   [x]? ? Neuromuscular re-education: facilitate righting/equilibrium reactions, midline orientation, scapular stability/mobility, normalize muscle tone, and facilitate active functional movement/attention  []? ? Delirium prevention/treatment   []? Positioning to improve functional independence and prevent skin breakdown   []? ?Other:     Time In: 9:25  Time Out: 10:05      Min Units   Therapeutic Ex 48575     Therapeutic Activities 34432 89  1   ADL/Self Care 30057 20 2   Orthotic Management 50079     Neuro Re-Ed 50923     Non-Billable Time     TOTAL TIMED TREATMENT 40 [address] BURNETT/CATHLEEN [id number]

## 2021-03-01 NOTE — PROGRESS NOTES
Department of Internal Medicine  Nephrology Attending Progress Note        SUBJECTIVE: Mr. Felisa Miller resting in bed. Is having some flatus. ,  Sodiums remain elevated reflecting sodium content and IV fluids.   Patient remains on TPN, HTN not well controlled NTG ointment started, does not appear to be getting prn hydralazine    Physical Exam:    Vitals:    03/01/21 1300   BP: (!) 183/80   Pulse: 92   Resp: 18   Temp: 98.5 °F (36.9 °C)   SpO2: 93%       I/O last 24 hours:  Intake/Output 1769/1675    Weight:195    General Appearance:  awake, alert, oriented, in mild distress, NG draining some blood-tinged fluid, poor oral hygiene  Skin: No rash  Neck:  neck- supple, no mass, non-tender and no bruits  Lungs: Decreased breath sounds at bases  Heart: Regular rhythm     Abdominal: Decreased bowel sounds  Extremities: No edema  Peripheral Pulses:  +2    DATA:    CBC with Differential:    Lab Results   Component Value Date    WBC 10.7 03/01/2021    RBC 3.96 03/01/2021    HGB 12.5 03/01/2021    HCT 40.1 03/01/2021     03/01/2021    .3 03/01/2021    MCH 31.6 03/01/2021    MCHC 31.2 03/01/2021    RDW 13.8 03/01/2021    NRBC 0.0 02/21/2021    SEGSPCT 56 02/03/2014    LYMPHOPCT 11.2 03/01/2021    MONOPCT 7.5 03/01/2021    BASOPCT 0.2 03/01/2021    MONOSABS 0.80 03/01/2021    LYMPHSABS 1.20 03/01/2021    EOSABS 0.17 03/01/2021    BASOSABS 0.02 03/01/2021     BMP:    Lab Results   Component Value Date     03/01/2021    K 4.2 03/01/2021    K 4.2 02/21/2021     03/01/2021    CO2 30 03/01/2021    BUN 23 03/01/2021    LABALBU 3.0 02/28/2021    CREATININE 1.4 03/01/2021    CALCIUM 8.9 03/01/2021    GFRAA 59 03/01/2021    LABGLOM 59 03/01/2021    GLUCOSE 192 03/01/2021          nitroglycerin  0.5 inch Topical 4 times per day    heparin flush  3 mL Intravenous 2 times per day    aspirin  81 mg Oral Daily    lansoprazole  30 mg Per NG tube Daily    labetalol  30 mg Intravenous Q6H    ceFAZolin  2,000 mg Intravenous See Admin Instructions    atorvastatin  10 mg Oral Daily    brimonidine  1 drop Left Eye BID    latanoprost  1 drop Left Eye Nightly    timolol  1 drop Left Eye BID    sodium chloride flush  10 mL Intravenous 2 times per day    enoxaparin  30 mg Subcutaneous Daily    insulin lispro  0-12 Units Subcutaneous Q4H    influenza virus vaccine  0.5 mL Intramuscular Prior to discharge      PN-Adult  3 IN 1 Central Line (Standard)      PN-Adult  3 IN 1 Central Line (Standard) 75 mL/hr at 02/28/21 1808    dextrose 75 mL/hr at 02/27/21 0846    dextrose       bisacodyl, sodium chloride flush, heparin flush, oxyCODONE **OR** oxyCODONE, HYDROmorphone **OR** HYDROmorphone, hydrALAZINE, morphine, phenol, trimethobenzamide, ipratropium-albuterol, nitroGLYCERIN, sodium chloride flush, polyethylene glycol, acetaminophen **OR** acetaminophen, glucose, dextrose, glucagon (rDNA), dextrose    IMPRESSION/RECOMMENDATIONS:      Acute kidney injury superimposed on CKD 3-A, nonoliguric, creatinine improving  Hypokalemia improving  Hypocalcemia  improving  Hypertension not well controlled labetalol increased, has not been getting prn  Iv hydralazine , nursing not able to give a reason   Small bowel otruction as per surgery  Type 2 diabetes minimal proteinuria noted  Worsening vision of lt eye evaluated by ophthamology  Hypernatremia secondary to sodium in TPN hopefully if gut function begins improving can wean tpn and should improve      Versa Eisenmenger, MD  3/1/2021 2:25 PM

## 2021-03-01 NOTE — PROGRESS NOTES
[id number] [facility] Hospitalist   Progress Note    Admitting Date and Time: 2/20/2021  5:15 PM  Admit Dx: Intestinal obstruction (HCC) [K56.609]    Subjective:    Pt sitting up in chair in no acute distress. NGT clamped. Patient tolerating clear liquids. States he feels well, much better than before. Currently on 4 LNC tolerating well. TPN infusing. No new concerns noted. Per RN: No new concerns noted. ROS: denies fever, chills, cp, sob, n/v, HA unless stated above.      nitroglycerin  0.5 inch Topical 4 times per day    heparin flush  3 mL Intravenous 2 times per day    aspirin  81 mg Oral Daily    lansoprazole  30 mg Per NG tube Daily    labetalol  30 mg Intravenous Q6H    ceFAZolin  2,000 mg Intravenous See Admin Instructions    atorvastatin  10 mg Oral Daily    brimonidine  1 drop Left Eye BID    latanoprost  1 drop Left Eye Nightly    timolol  1 drop Left Eye BID    sodium chloride flush  10 mL Intravenous 2 times per day    enoxaparin  30 mg Subcutaneous Daily    insulin lispro  0-12 Units Subcutaneous Q4H    influenza virus vaccine  0.5 mL Intramuscular Prior to discharge         bisacodyl, 10 mg, Daily PRN      sodium chloride flush, 10 mL, PRN      heparin flush, 3 mL, PRN      oxyCODONE, 5 mg, Q4H PRN    Or      oxyCODONE, 10 mg, Q4H PRN      HYDROmorphone, 0.5 mg, Q4H PRN    Or      HYDROmorphone, 1 mg, Q4H PRN      hydrALAZINE, 20 mg, Q6H PRN      morphine, 1 mg, Q4H PRN      phenol, 1 spray, Q2H PRN      trimethobenzamide, 200 mg, Q6H PRN      ipratropium-albuterol, 3 mL, Q6H PRN      nitroGLYCERIN, 0.4 mg, Q5 Min PRN      sodium chloride flush, 10 mL, PRN      polyethylene glycol, 17 g, Daily PRN      acetaminophen, 650 mg, Q6H PRN    Or      acetaminophen, 650 mg, Q6H PRN      glucose, 15 g, PRN      dextrose, 12.5 g, PRN      glucagon (rDNA), 1 mg, PRN      dextrose, 100 mL/hr, PRN         Objective:    BP (!) 199/92   Pulse 88   Temp 98.6 °F (37 °C) (Oral)   Resp 18   Ht 5' 11\" (1.803 m)   Wt 195 lb 9.6 oz (88.7 kg)   SpO2 93%   BMI 27.28 kg/m²   General Appearance: alert and oriented to person, place and reoriented to time and in no acute distress  Skin: warm and dry  Head: normocephalic and atraumatic. Left Nares NGT  Eyes: pupils equal, round, and reactive to light, extraocular eye movements intact, conjunctivae normal  Neck: neck supple and non tender without mass   Pulmonary/Chest: clear to auscultation bilaterally- no wheezes, rales or rhonchi, normal air movement, no respiratory distress  Cardiovascular: normal rate, normal S1 and S2 and no no rubs, gallops, murmur noted. Abdomen: soft, non-tender, non-distended, normal bowel sounds, no rebound guarding rigidity noted. Dressing to mid abdomen CDI  Extremities: no cyanosis, no clubbing and no edema  Neurologic: no cranial nerve deficit and speech normal      Recent Labs     02/28/21  0756 02/28/21 1955 03/01/21  0558   * 155* 157*   K 4.3 4.1 4.2   * 120* 122*   CO2 29 29 30*   BUN 22 22 23   CREATININE 1.5* 1.4* 1.4*   GLUCOSE 257* 288* 192*   CALCIUM 8.6 8.7 8.9       Recent Labs     02/27/21  1323 02/28/21  0756   ALKPHOS 58 64   PROT 5.6* 5.6*   LABALBU 2.8* 3.0*   BILITOT 0.7 0.6   AST 14 17   ALT 12 13       Recent Labs     02/27/21  1323 02/28/21  0610 03/01/21  0558   WBC 9.8 9.5 10.7   RBC 3.95 3.57* 3.96   HGB 12.7 12.0* 12.5   HCT 39.5 37.0 40.1   .0* 103.6* 101.3*   MCH 32.2 33.6 31.6   MCHC 32.2 32.4 31.2*   RDW 14.5 14.4 13.8   * 108* 102*   MPV 11.0 11.3 11.3            Radiology:   US CAROTID ARTERY BILATERAL   Final Result   The right internal carotid artery demonstrates 50-69% stenosis. The left internal carotid artery demonstrates 70-99% stenosis. Bilateral vertebral arteries are patent with flow in the normal direction.       NM Cardiac Stress Test Nuclear Imaging   Final Result   The myocardial perfusion imaging demonstrates a fixed inferolateral   defect with corresponding wall motion abnormality consistent with a   prior myocardial infarction. No reversible ischemia is seen. The calculated ejection fraction 64%. However, visually the left   ventricle appears to be severely dilated with moderate global   hypokinesis and severe hypokinesis of the inferolateral wall. Consider   an echocardiogram if clinically indicated. MRI BRAIN W WO CONTRAST   Final Result   1. Questionable punctate 4 mm diameter focus of acute ischemia involving the   mid left frontal lobe deep white matter. 2. Moderate cerebral white matter chronic microvascular ischemic disease. 3. Mild diffuse cerebral atrophy. CT HEAD WO CONTRAST   Final Result   1. There is no acute intracranial abnormality. Specifically, there is no   intracranial hemorrhage. 2. Atrophy and periventricular leukomalacia,   3. Given the symptoms of visual loss an asses of any infarct within the   occipital lobes or suprasellar mass dedicated MRI of the brain is recommended   for further evaluation. XR ABDOMEN FOR NG/OG/NE TUBE PLACEMENT   Final Result   Satisfactory position of nasogastric tube following advancement. XR ABDOMEN (KUB) (SINGLE AP VIEW)   Final Result   The tip of the NG tube is at the level of the diaphragm. Please advance the   NG tube by an additional 10 cm   Multiple dilated loops of small bowel consistent with small bowel obstruction. CT ABDOMEN PELVIS WO CONTRAST Additional Contrast? Oral   Final Result   Persistent high-grade small-bowel obstruction with significantly dilated   proximal small bowel loops with the transition point in the right lower   quadrant with the swirling appearance in the mesentery. Mesenteric or   midgrade volvulus or adhesions/stricture is considered. Decompression is   recommended. XR ABDOMEN FOR NG/OG/NE TUBE PLACEMENT   Final Result   Satisfactory position of enteric tube.       CT ABDOMEN PELVIS WO CONTRAST Additional Contrast? None   Final Result   Dilatation of multiple segments of small bowel throughout the abdomen and   pelvis with air-fluid levels consistent with small bowel obstruction. 2 areas of transition are seen in the right mid abdomen. A transition is seen at a site of rotatory configuration of small bowel and   mesenteric vessels with narrowing of small bowel suggestive of small bowel   volvulus. Follow-up with oral contrast may be helpful in further evaluation. Enlarged prostate. Prominent atherosclerotic peripheral vascular disease. XR CHEST PORTABLE   Final Result   Mild cardiomegaly status post median sternotomy. No acute infiltrates or   failure. CTA NECK W CONTRAST    (Results Pending)       Assessment:  Active Problems:    Preoperative cardiovascular examination    SBO (small bowel obstruction) ([address]    Cerebrovascular accident (CVA) ([address]    Hypernatremia  Resolved Problems:    * No resolved hospital problems. *      Plan:  1. SBO- 2/21/2020 CT abdomen pelvis with significantly dilated fluid-filled proximal small bowel loops measuring up to 4.2 cm. Distal small bowel loops and colon collapsed. Narrowing of small bowel loops in the right lower quadrant with a twisted appearance. 2/26/2021 S/P Exp laparotomy for small bowel obstruction related to adhesive bands NPO. Continue NGT clamped. Continue TPN   + Gas clear liquid diet initiated. Tolerating well. General surgery input appreciated. 2. Hypernatremia- Na+ 157. TPN  Na+ adjustment. Continue to follow closely. Nephrology input appreciated. 3. Hypophosphatemia- Phos 1.8. Phos pending today. Nephrology following. 4. Hypokalemia- K+4. Philipp Angry Continue to follow closely replete as needed. 5. EMELY superimposed on CKD stage IIIa-BUN/Crt 3/1.4 avoid nephrotoxic agents. Follow closely. 6. Hx CAD-denies CP.  1/1/1994 CABG x4. Continue BB/ASA/statin therapy. 7. DM-A1c 7.6  Glucose 192  Continue SSI/hypoglycemic protocol/POCGT.

## 2021-03-01 NOTE — PROGRESS NOTES
GENERAL SURGERY  DAILY PROGRESS NOTE  3/1/2021    Subjective:  Patient states he is doing well today. Is passing flatus, no BM. Denies nausea, vomiting. Objective:  BP (!) 176/88   Pulse 98   Temp 98.5 °F (36.9 °C) (Oral)   Resp 18   Ht 5' 11\" (1.803 m)   Wt 195 lb 9.6 oz (88.7 kg)   SpO2 95%   BMI 27.28 kg/m²     GENERAL:  Laying in bed, awake, alert, cooperative, no apparent distress  HEAD: Normocephalic, atraumatic  LUNGS:  No increased work of breathing  CARDIOVASCULAR:  RR  ABDOMEN:  Softly distended, appropriately tender. Incisions C/D/I.  NGT in place with 550 bilious output recorded  EXTREMITIES: No edema or swelling    Assessment/Plan:  [de-identified] y.o. 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    - Clamp NGT, will check residuals at noon  - Will start CLD if minimal residuals  - Continue TPN, decreased sodium content  - Adequate UOP, EMELY, appreciate nephrology recommendations  - Pain/nausea control prn  - Continue to monitor for bowel function    Electronically signed by Abebe García MD on 3/1/2021 at 6:55 AM     The patient was seen and examined  Chart reviewed  Agree with the assessment and plan  Clamp NG  Clear liquid diet

## 2021-03-01 NOTE — PLAN OF CARE
Problem: Pain:  Goal: Pain level will decrease  Description: Pain level will decrease  Outcome: Met This Shift     Problem: Pain:  Goal: Control of acute pain  Description: Control of acute pain  Outcome: Met This Shift     Problem: Falls - Risk of:  Goal: Will remain free from falls  Description: Will remain free from falls  Outcome: Met This Shift     Problem: Falls - Risk of:  Goal: Absence of physical injury  Description: Absence of physical injury  Outcome: Met This Shift     Problem: Skin Integrity:  Goal: Will show no infection signs and symptoms  Description: Will show no infection signs and symptoms  Outcome: Met This Shift     Problem: Skin Integrity:  Goal: Absence of new skin breakdown  Description: Absence of new skin breakdown  Outcome: Met This Shift

## 2021-03-01 NOTE — CARE COORDINATION
COVID negative 2/20. POD #3 EL, SHALOM. NG to LIS. O2 4lNC- wears home O2 2lNC through [facility]. PICC 2/27. TPN continued. Will need L carotid surgery when pt stabilizes per vascular. Pt is utilizing [facility] benefits for this hospital admission. Will utilize Medicare Part A on discharge- pt is only 60% service connected and not eligible for snf through [facility]. [facility] following- also accepted at [facility] pending progress. Will follow Anum Hopkins

## 2021-03-02 LAB
ACANTHOCYTES: ABNORMAL
ANION GAP SERPL CALCULATED.3IONS-SCNC: 5 MMOL/L (ref 7–16)
BASOPHILS ABSOLUTE: 0.01 E9/L (ref 0–0.2)
BASOPHILS RELATIVE PERCENT: 0.1 % (ref 0–2)
BUN BLDV-MCNC: 25 MG/DL (ref 8–23)
CALCIUM SERPL-MCNC: 8.6 MG/DL (ref 8.6–10.2)
CHLORIDE BLD-SCNC: 111 MMOL/L (ref 98–107)
CO2: 29 MMOL/L (ref 22–29)
CREAT SERPL-MCNC: 1.4 MG/DL (ref 0.7–1.2)
EOSINOPHILS ABSOLUTE: 0.13 E9/L (ref 0.05–0.5)
EOSINOPHILS RELATIVE PERCENT: 1.4 % (ref 0–6)
GFR AFRICAN AMERICAN: 59
GFR NON-AFRICAN AMERICAN: 59 ML/MIN/1.73
GLUCOSE BLD-MCNC: 284 MG/DL (ref 74–99)
HCT VFR BLD CALC: 34.2 % (ref 37–54)
HEMOGLOBIN: 11 G/DL (ref 12.5–16.5)
IMMATURE GRANULOCYTES #: 0.05 E9/L
IMMATURE GRANULOCYTES %: 0.6 % (ref 0–5)
LYMPHOCYTES ABSOLUTE: 1.47 E9/L (ref 1.5–4)
LYMPHOCYTES RELATIVE PERCENT: 16.2 % (ref 20–42)
MCH RBC QN AUTO: 32 PG (ref 26–35)
MCHC RBC AUTO-ENTMCNC: 32.2 % (ref 32–34.5)
MCV RBC AUTO: 99.4 FL (ref 80–99.9)
METER GLUCOSE: 210 MG/DL (ref 74–99)
METER GLUCOSE: 240 MG/DL (ref 74–99)
METER GLUCOSE: 251 MG/DL (ref 74–99)
METER GLUCOSE: 256 MG/DL (ref 74–99)
METER GLUCOSE: 270 MG/DL (ref 74–99)
METER GLUCOSE: 363 MG/DL (ref 74–99)
MONOCYTES ABSOLUTE: 0.54 E9/L (ref 0.1–0.95)
MONOCYTES RELATIVE PERCENT: 6 % (ref 2–12)
NEUTROPHILS ABSOLUTE: 6.86 E9/L (ref 1.8–7.3)
NEUTROPHILS RELATIVE PERCENT: 75.7 % (ref 43–80)
PDW BLD-RTO: 13.5 FL (ref 11.5–15)
PLATELET # BLD: 86 E9/L (ref 130–450)
PLATELET CONFIRMATION: NORMAL
PMV BLD AUTO: 11.9 FL (ref 7–12)
POIKILOCYTES: ABNORMAL
POLYCHROMASIA: ABNORMAL
POTASSIUM SERPL-SCNC: 3.8 MMOL/L (ref 3.5–5)
RBC # BLD: 3.44 E12/L (ref 3.8–5.8)
SODIUM BLD-SCNC: 145 MMOL/L (ref 132–146)
WBC # BLD: 9.1 E9/L (ref 4.5–11.5)

## 2021-03-02 PROCEDURE — 6370000000 HC RX 637 (ALT 250 FOR IP): Performed by: INTERNAL MEDICINE

## 2021-03-02 PROCEDURE — 6360000002 HC RX W HCPCS: Performed by: STUDENT IN AN ORGANIZED HEALTH CARE EDUCATION/TRAINING PROGRAM

## 2021-03-02 PROCEDURE — 6360000002 HC RX W HCPCS: Performed by: INTERNAL MEDICINE

## 2021-03-02 PROCEDURE — 6370000000 HC RX 637 (ALT 250 FOR IP): Performed by: STUDENT IN AN ORGANIZED HEALTH CARE EDUCATION/TRAINING PROGRAM

## 2021-03-02 PROCEDURE — 2580000003 HC RX 258: Performed by: INTERNAL MEDICINE

## 2021-03-02 PROCEDURE — APPSS30 APP SPLIT SHARED TIME 16-30 MINUTES: Performed by: NURSE PRACTITIONER

## 2021-03-02 PROCEDURE — 97530 THERAPEUTIC ACTIVITIES: CPT

## 2021-03-02 PROCEDURE — 36415 COLL VENOUS BLD VENIPUNCTURE: CPT

## 2021-03-02 PROCEDURE — 2500000003 HC RX 250 WO HCPCS: Performed by: STUDENT IN AN ORGANIZED HEALTH CARE EDUCATION/TRAINING PROGRAM

## 2021-03-02 PROCEDURE — 99232 SBSQ HOSP IP/OBS MODERATE 35: CPT | Performed by: INTERNAL MEDICINE

## 2021-03-02 PROCEDURE — 85025 COMPLETE CBC W/AUTO DIFF WBC: CPT

## 2021-03-02 PROCEDURE — 97535 SELF CARE MNGMENT TRAINING: CPT

## 2021-03-02 PROCEDURE — 6370000000 HC RX 637 (ALT 250 FOR IP): Performed by: PSYCHIATRY & NEUROLOGY

## 2021-03-02 PROCEDURE — 80048 BASIC METABOLIC PNL TOTAL CA: CPT

## 2021-03-02 PROCEDURE — 2500000003 HC RX 250 WO HCPCS: Performed by: FAMILY MEDICINE

## 2021-03-02 PROCEDURE — 2060000000 HC ICU INTERMEDIATE R&B

## 2021-03-02 PROCEDURE — 82962 GLUCOSE BLOOD TEST: CPT

## 2021-03-02 RX ADMIN — Medication 300 UNITS: at 08:57

## 2021-03-02 RX ADMIN — BRIMONIDINE TARTRATE 1 DROP: 2 SOLUTION OPHTHALMIC at 08:58

## 2021-03-02 RX ADMIN — LABETALOL HYDROCHLORIDE 30 MG: 5 INJECTION INTRAVENOUS at 05:39

## 2021-03-02 RX ADMIN — LANSOPRAZOLE 30 MG: KIT at 05:51

## 2021-03-02 RX ADMIN — Medication 300 UNITS: at 20:52

## 2021-03-02 RX ADMIN — OXYCODONE HYDROCHLORIDE 5 MG: 5 TABLET ORAL at 22:49

## 2021-03-02 RX ADMIN — NITROGLYCERIN 0.5 INCH: 20 OINTMENT TOPICAL at 18:37

## 2021-03-02 RX ADMIN — TIMOLOL MALEATE 1 DROP: 5 SOLUTION/ DROPS OPHTHALMIC at 08:58

## 2021-03-02 RX ADMIN — ATORVASTATIN CALCIUM 10 MG: 10 TABLET, FILM COATED ORAL at 08:55

## 2021-03-02 RX ADMIN — OXYCODONE HYDROCHLORIDE 5 MG: 5 TABLET ORAL at 05:49

## 2021-03-02 RX ADMIN — INSULIN LISPRO 6 UNITS: 100 INJECTION, SOLUTION INTRAVENOUS; SUBCUTANEOUS at 12:11

## 2021-03-02 RX ADMIN — LABETALOL HYDROCHLORIDE 30 MG: 5 INJECTION INTRAVENOUS at 00:08

## 2021-03-02 RX ADMIN — ENOXAPARIN SODIUM 30 MG: 100 INJECTION SUBCUTANEOUS at 08:55

## 2021-03-02 RX ADMIN — TIMOLOL MALEATE 1 DROP: 5 SOLUTION/ DROPS OPHTHALMIC at 20:51

## 2021-03-02 RX ADMIN — NITROGLYCERIN 0.5 INCH: 20 OINTMENT TOPICAL at 00:10

## 2021-03-02 RX ADMIN — LABETALOL HYDROCHLORIDE 30 MG: 5 INJECTION INTRAVENOUS at 18:37

## 2021-03-02 RX ADMIN — ASPIRIN 81 MG: 81 TABLET, CHEWABLE ORAL at 08:56

## 2021-03-02 RX ADMIN — SODIUM CHLORIDE, PRESERVATIVE FREE 10 ML: 5 INJECTION INTRAVENOUS at 08:57

## 2021-03-02 RX ADMIN — LATANOPROST 1 DROP: 50 SOLUTION OPHTHALMIC at 20:51

## 2021-03-02 RX ADMIN — LABETALOL HYDROCHLORIDE 30 MG: 5 INJECTION INTRAVENOUS at 12:09

## 2021-03-02 RX ADMIN — ACETAMINOPHEN 650 MG: 325 TABLET ORAL at 00:11

## 2021-03-02 RX ADMIN — INSULIN LISPRO 6 UNITS: 100 INJECTION, SOLUTION INTRAVENOUS; SUBCUTANEOUS at 22:43

## 2021-03-02 RX ADMIN — INSULIN LISPRO 6 UNITS: 100 INJECTION, SOLUTION INTRAVENOUS; SUBCUTANEOUS at 05:45

## 2021-03-02 RX ADMIN — NITROGLYCERIN 0.5 INCH: 20 OINTMENT TOPICAL at 12:11

## 2021-03-02 RX ADMIN — PHENOL 1 SPRAY: 1.5 LIQUID ORAL at 04:50

## 2021-03-02 RX ADMIN — INSULIN LISPRO 10 UNITS: 100 INJECTION, SOLUTION INTRAVENOUS; SUBCUTANEOUS at 15:35

## 2021-03-02 RX ADMIN — BRIMONIDINE TARTRATE 1 DROP: 2 SOLUTION OPHTHALMIC at 15:35

## 2021-03-02 RX ADMIN — SODIUM CHLORIDE, PRESERVATIVE FREE 10 ML: 5 INJECTION INTRAVENOUS at 20:51

## 2021-03-02 RX ADMIN — NITROGLYCERIN 0.5 INCH: 20 OINTMENT TOPICAL at 05:39

## 2021-03-02 RX ADMIN — INSULIN LISPRO 4 UNITS: 100 INJECTION, SOLUTION INTRAVENOUS; SUBCUTANEOUS at 18:38

## 2021-03-02 RX ADMIN — INSULIN LISPRO 4 UNITS: 100 INJECTION, SOLUTION INTRAVENOUS; SUBCUTANEOUS at 02:22

## 2021-03-02 RX ADMIN — POTASSIUM CHLORIDE: 2 INJECTION, SOLUTION, CONCENTRATE INTRAVENOUS at 18:38

## 2021-03-02 ASSESSMENT — PAIN DESCRIPTION - ONSET: ONSET: SUDDEN

## 2021-03-02 ASSESSMENT — PAIN SCALES - GENERAL
PAINLEVEL_OUTOF10: 5
PAINLEVEL_OUTOF10: 8
PAINLEVEL_OUTOF10: 0
PAINLEVEL_OUTOF10: 7

## 2021-03-02 ASSESSMENT — PAIN DESCRIPTION - DESCRIPTORS
DESCRIPTORS: SORE
DESCRIPTORS: SHARP

## 2021-03-02 ASSESSMENT — PAIN DESCRIPTION - FREQUENCY: FREQUENCY: INTERMITTENT

## 2021-03-02 ASSESSMENT — PAIN DESCRIPTION - LOCATION
LOCATION: CHEST
LOCATION: CHEST

## 2021-03-02 NOTE — PROGRESS NOTES
P Quality Flow/Interdisciplinary Rounds Progress Note        Quality Flow Rounds held on March 2, 2021    Disciplines Attending:  Bedside Nurse, ,  and Nursing Unit Leadership    Tramaine Ledbetter was admitted on 2/20/2021  5:15 PM    Anticipated Discharge Date:  Expected Discharge Date: 03/05/21    Disposition:    Justo Score:  Justo Scale Score: 15    Readmission Risk              Risk of Unplanned Readmission:        29           Discussed patient goal for the day, patient clinical progression, and barriers to discharge.   The following Goal(s) of the Day/Commitment(s) have been identified:  tolerate advanced diet      Jose Mosley  March 2, 2021

## 2021-03-02 NOTE — CARE COORDINATION
COVID negative 2/20. POD #4 SHALOM CEJA. NG removed- started on full liquid diet. TPN continued via PICC. On room air- wears home O2 2lNC through [facility]. Pt is utilizing VA benefits for this hospital admission. Will utilize Medicare Part A on discharge- pt is only 60% service connected and not eligible for snf through [facility]. [facility] following-per surgery, pt is not ready for [facility] today. Pt is also accepted at [facility] pending progress- HOWEVER, they CANNOT accept pt on TPN per Brennen Acevedo @ [facility]. Will follow Joce Zamarripa

## 2021-03-02 NOTE — PROGRESS NOTES
[address] [facility] Hospitalist   Progress Note    Admitting Date and Time: 2/20/2021  5:15 PM  Admit Dx: Intestinal obstruction ([address] [K56.609]    Subjective:    Pt in bed in no acute distress. Patient is currently O RA tolerating well. Patient states he feels much better today. Patient has been tolerating clear liquid diet. Has been advanced to full liquid. NGT removed. No new concerns noted. Per RN: No new concerns noted. ROS: denies fever, chills, cp, sob, n/v, HA unless stated above.      nitroglycerin  0.5 inch Topical 4 times per day    heparin flush  3 mL Intravenous 2 times per day    aspirin  81 mg Oral Daily    lansoprazole  30 mg Per NG tube Daily    labetalol  30 mg Intravenous Q6H    ceFAZolin  2,000 mg Intravenous See Admin Instructions    atorvastatin  10 mg Oral Daily    brimonidine  1 drop Left Eye BID    latanoprost  1 drop Left Eye Nightly    timolol  1 drop Left Eye BID    sodium chloride flush  10 mL Intravenous 2 times per day    enoxaparin  30 mg Subcutaneous Daily    insulin lispro  0-12 Units Subcutaneous Q4H    influenza virus vaccine  0.5 mL Intramuscular Prior to discharge         bisacodyl, 10 mg, Daily PRN      sodium chloride flush, 10 mL, PRN      heparin flush, 3 mL, PRN      oxyCODONE, 5 mg, Q4H PRN    Or      oxyCODONE, 10 mg, Q4H PRN      HYDROmorphone, 0.5 mg, Q4H PRN    Or      HYDROmorphone, 1 mg, Q4H PRN      hydrALAZINE, 20 mg, Q6H PRN      morphine, 1 mg, Q4H PRN      phenol, 1 spray, Q2H PRN      trimethobenzamide, 200 mg, Q6H PRN      ipratropium-albuterol, 3 mL, Q6H PRN      nitroGLYCERIN, 0.4 mg, Q5 Min PRN      sodium chloride flush, 10 mL, PRN      polyethylene glycol, 17 g, Daily PRN      acetaminophen, 650 mg, Q6H PRN    Or      acetaminophen, 650 mg, Q6H PRN      glucose, 15 g, PRN      dextrose, 12.5 g, PRN      glucagon (rDNA), 1 mg, PRN      dextrose, 100 mL/hr, PRN         Objective:    BP (!) 162/64   Pulse 75   Temp 98.1 °F (36.7 °C) (Oral)   Resp 18   Ht 5' 11\" (1.803 m)   Wt 204 lb 12.8 oz (92.9 kg)   SpO2 93%   BMI 28.56 kg/m²   General Appearance: alert and oriented to person, place and reoriented to time and in no acute distress  Skin: warm and dry  Head: normocephalic and atraumatic. Eyes: pupils equal, round, and reactive to light, extraocular eye movements intact, conjunctivae normal  Neck: neck supple and non tender without mass   Pulmonary/Chest: clear to auscultation bilaterally- no wheezes, rales or rhonchi, normal air movement, no respiratory distress  Cardiovascular: normal rate, normal S1 and S2 and no no rubs, gallops, murmur noted. Abdomen: soft, non-tender, non-distended, normal bowel sounds, no rebound guarding rigidity noted. Dressing to mid abdomen CDI  Extremities: no cyanosis, no clubbing and no edema  Neurologic: no cranial nerve deficit and speech normal      Recent Labs     02/28/21  1955 03/01/21  0558 03/02/21  0543   * 157* 145   K 4.1 4.2 3.8   * 122* 111*   CO2 29 30* 29   BUN 22 23 25*   CREATININE 1.4* 1.4* 1.4*   GLUCOSE 288* 192* 284*   CALCIUM 8.7 8.9 8.6       Recent Labs     02/27/21  1323 02/28/21  0756   ALKPHOS 58 64   PROT 5.6* 5.6*   LABALBU 2.8* 3.0*   BILITOT 0.7 0.6   AST 14 17   ALT 12 13       Recent Labs     02/28/21  0610 03/01/21  0558 03/02/21  0543   WBC 9.5 10.7 9.1   RBC 3.57* 3.96 3.44*   HGB 12.0* 12.5 11.0*   HCT 37.0 40.1 34.2*   .6* 101.3* 99.4   MCH 33.6 31.6 32.0   MCHC 32.4 31.2* 32.2   RDW 14.4 13.8 13.5   * 102* 86*   MPV 11.3 11.3 11.9            Radiology:   US CAROTID ARTERY BILATERAL   Final Result   The right internal carotid artery demonstrates 50-69% stenosis. The left internal carotid artery demonstrates 70-99% stenosis. Bilateral vertebral arteries are patent with flow in the normal direction.       NM Cardiac Stress Test Nuclear Imaging   Final Result   The myocardial perfusion imaging demonstrates a fixed inferolateral   defect with corresponding wall motion abnormality consistent with a   prior myocardial infarction. No reversible ischemia is seen. The calculated ejection fraction 64%. However, visually the left   ventricle appears to be severely dilated with moderate global   hypokinesis and severe hypokinesis of the inferolateral wall. Consider   an echocardiogram if clinically indicated. MRI BRAIN W WO CONTRAST   Final Result   1. Questionable punctate 4 mm diameter focus of acute ischemia involving the   mid left frontal lobe deep white matter. 2. Moderate cerebral white matter chronic microvascular ischemic disease. 3. Mild diffuse cerebral atrophy. CT HEAD WO CONTRAST   Final Result   1. There is no acute intracranial abnormality. Specifically, there is no   intracranial hemorrhage. 2. Atrophy and periventricular leukomalacia,   3. Given the symptoms of visual loss an asses of any infarct within the   occipital lobes or suprasellar mass dedicated MRI of the brain is recommended   for further evaluation. XR ABDOMEN FOR NG/OG/NE TUBE PLACEMENT   Final Result   Satisfactory position of nasogastric tube following advancement. XR ABDOMEN (KUB) (SINGLE AP VIEW)   Final Result   The tip of the NG tube is at the level of the diaphragm. Please advance the   NG tube by an additional 10 cm   Multiple dilated loops of small bowel consistent with small bowel obstruction. CT ABDOMEN PELVIS WO CONTRAST Additional Contrast? Oral   Final Result   Persistent high-grade small-bowel obstruction with significantly dilated   proximal small bowel loops with the transition point in the right lower   quadrant with the swirling appearance in the mesentery. Mesenteric or   midgrade volvulus or adhesions/stricture is considered. Decompression is   recommended. XR ABDOMEN FOR NG/OG/NE TUBE PLACEMENT   Final Result   Satisfactory position of enteric tube.       CT ABDOMEN PELVIS WO CONTRAST Additional Contrast? None   Final Result   Dilatation of multiple segments of small bowel throughout the abdomen and   pelvis with air-fluid levels consistent with small bowel obstruction. 2 areas of transition are seen in the right mid abdomen. A transition is seen at a site of rotatory configuration of small bowel and   mesenteric vessels with narrowing of small bowel suggestive of small bowel   volvulus. Follow-up with oral contrast may be helpful in further evaluation. Enlarged prostate. Prominent atherosclerotic peripheral vascular disease. XR CHEST PORTABLE   Final Result   Mild cardiomegaly status post median sternotomy. No acute infiltrates or   failure. CTA NECK W CONTRAST    (Results Pending)       Assessment:  Active Problems:    Preoperative cardiovascular examination    SBO (small bowel obstruction) ([address]    Cerebrovascular accident (CVA) ([address]    Hypernatremia  Resolved Problems:    * No resolved hospital problems. *      Plan:  1. SBO- 2/21/2020 CT abdomen pelvis with significantly dilated fluid-filled proximal small bowel loops measuring up to 4.2 cm. Distal small bowel loops and colon collapsed. Narrowing of small bowel loops in the right lower quadrant with a twisted appearance. 2/26/2021 S/P Exp laparotomy for small bowel obstruction related to adhesive bands NPO. Continue NGT clamped. Continue TPN   + Gas clear liquid diet advance to full liquid. Tolerating well. General surgery input appreciated. 2. Hypernatremia- Na+ 157>145. Improved with TPN adjustment. Continue to follow closely. Nephrology input appreciated. 3. Hypophosphatemia- Phos 1.8. Phos pending today. Nephrology following. 4. Hypokalemia- K+3.8 Continue to follow closely replete as needed. 5. EMELY superimposed on CKD stage IIIa-BUN/Crt 25/1.4 avoid nephrotoxic agents. Follow closely. 6. Hx CAD-denies CP.  1/1/1994 CABG x4. Continue BB/ASA/statin therapy.   7. DM-A1c 7.6  Glucose 270 Continue SSI/hypoglycemic protocol/POCGT. Follow Adjust as needed. 8. HTN-Labetalol 30 mg IV every 6 hours initiated. Hydralazine IV as needed. Follow closely adjust as needed. 9. ?Left Frontal Lobe ischemia-Acute-subacute ischemic stroke left middle cerebral artery territory likely lacunar infarction due to small vessel disease. Continue ASA/statin therapy. Carotid US Right internal artery 50-69%. Left internal Artery 70-99%. Bilateral vertebral arteries are patent with flow in normal direction. If Delay in carotid intervention, Plavix will need added to ASA therapy. Per Neurology. Neurology/vascular Surgery input appreciated. 10. Left eye vision loss-glaucoma specialist in [facility] for follow-up as outpatient. Likely loss of vision 2/2 vascular disease per ophthalmology. 11. Bilateral Carotid Stenosis- Carotid US Right internal artery 50-69%. Left internal Artery 70-99%. Bilateral vertebral arteries are patent with flow in normal direction. Dual Platelet therapy recommended. No immediate plans for Carotid endarterectomy per Vascular Surgery. Vascular surgery input appreciated. \  12. Disposition-SNF/LTACH. [facility] Continue to follow. Case management following. NOTE: This report was transcribed using voice recognition software. Every effort was made to ensure accuracy; however, inadvertent computerized transcription errors may be present. Electronically signed by Gabriel Thomas CNP on 3/2/2021 at 7:30 AM  HOSPITALIST William Covarrubias NOTE 3/2/2021 2347PM:    Details of the evaluation - subjective assessment (including medication profile, past medical, family and social history when applicable), examination, review of lab and test data, diagnostic impressions and medical decision making - performed by Leonel Major.  Dasia Thomas CNP, were discussed with me on the date of service and I agree with clinical information herein unless otherwise noted. The patient has been evaluated by me personally earlier today. Pt reports no fevers, chills,n/v.     Exam: heart reg at rate of 76, lungs cta, abd pos bs soft nt, ext neg for le edema    I agree with the assessment and plan of Jennifer Gupta. Juan M Abby - CNP    sbo  Hypernatremia  cva likely lacunar infarction  itz  Dm type 2 uncontrolled  htn  Left eye vision loss  hypophosphatemia  Hypokalemia  Thrombocytopenia  hyperlipidemia      Electronically signed by Raymundo Patterson D.O.   Hospitalist  [facility] Hospitalist [facility] at [facility]

## 2021-03-02 NOTE — PROGRESS NOTES
on call for . Recommended patient be placed on ng to liws overnight until 0600 on 3/2/21 then clamp.

## 2021-03-02 NOTE — PLAN OF CARE
Problem: Inadequate oral food/beverage intake (NI-2.1)  Goal: Food and/or Nutrient Delivery  Continue Diet and ADAT once med appropriate. Will Start Ensure High Pro ONS BID. Continue Current PN and begin to wean once pt able to tolerate solid PO intake. Description: Individualized approach for food/nutrient provision.   Outcome: Met This Shift

## 2021-03-02 NOTE — PROGRESS NOTES
Department of Internal Medicine  Nephrology Attending Progress Note        SUBJECTIVE:  Mr Linsey Paulino is feeling better today.   His NG tube was removed blood pressure control has improved with the use of hydralazine, patient has been started on some liquid diet, passing some flatus but no bowel movement, remains on TPN  Physical Exam:    Vitals:    03/02/21 0800   BP: (!) 174/83   Pulse: 82   Resp: 16   Temp: 98.8 °F (37.1 °C)   SpO2: 93%       I/O last 24 hours:  Intake/Output accurate intake/1000    Weight: 204  General Appearance:  awake, alert, oriented, in mild distress, NG draining some blood-tinged fluid, poor oral hygiene  Skin: No rash  Neck:  neck- supple, no mass, non-tender and no bruits  Lungs: Decreased breath sounds at bases  Heart: Regular rhythm     Abdominal: Decreased bowel sounds  Extremities: trace edema  Peripheral Pulses:  +2    DATA:    CBC with Differential:    Lab Results   Component Value Date    WBC 9.1 03/02/2021    RBC 3.44 03/02/2021    HGB 11.0 03/02/2021    HCT 34.2 03/02/2021    PLT 86 03/02/2021    MCV 99.4 03/02/2021    MCH 32.0 03/02/2021    MCHC 32.2 03/02/2021    RDW 13.5 03/02/2021    NRBC 0.0 02/21/2021    SEGSPCT 56 02/03/2014    LYMPHOPCT 16.2 03/02/2021    MONOPCT 6.0 03/02/2021    BASOPCT 0.1 03/02/2021    MONOSABS 0.54 03/02/2021    LYMPHSABS 1.47 03/02/2021    EOSABS 0.13 03/02/2021    BASOSABS 0.01 03/02/2021     BMP:    Lab Results   Component Value Date     03/02/2021    K 3.8 03/02/2021    K 4.2 02/21/2021     03/02/2021    CO2 29 03/02/2021    BUN 25 03/02/2021    LABALBU 3.0 02/28/2021    CREATININE 1.4 03/02/2021    CALCIUM 8.6 03/02/2021    GFRAA 59 03/02/2021    LABGLOM 59 03/02/2021    GLUCOSE 284 03/02/2021          nitroglycerin  0.5 inch Topical 4 times per day    heparin flush  3 mL Intravenous 2 times per day    aspirin  81 mg Oral Daily    lansoprazole  30 mg Per NG tube Daily    labetalol  30 mg Intravenous Q6H    ceFAZolin  2,000 mg Intravenous See Admin Instructions    atorvastatin  10 mg Oral Daily    brimonidine  1 drop Left Eye BID    latanoprost  1 drop Left Eye Nightly    timolol  1 drop Left Eye BID    sodium chloride flush  10 mL Intravenous 2 times per day    enoxaparin  30 mg Subcutaneous Daily    insulin lispro  0-12 Units Subcutaneous Q4H    influenza virus vaccine  0.5 mL Intramuscular Prior to discharge      PN-Adult  3 IN 1 Central Line (Standard)      PN-Adult  3 IN 1 Central Line (Standard) 75 mL/hr at 03/01/21 1806    dextrose 75 mL/hr at 02/27/21 0846    dextrose       bisacodyl, sodium chloride flush, heparin flush, oxyCODONE **OR** oxyCODONE, HYDROmorphone **OR** HYDROmorphone, hydrALAZINE, morphine, phenol, trimethobenzamide, ipratropium-albuterol, nitroGLYCERIN, sodium chloride flush, polyethylene glycol, acetaminophen **OR** acetaminophen, glucose, dextrose, glucagon (rDNA), dextrose    IMPRESSION/RECOMMENDATIONS:      Acute kidney injury superimposed on CKD 3-A, nonoliguric, creatinine improving  Hypokalemia improving  Hypocalcemia  improving  Hypertension improving  Control with increased  labetalol and  prn  Iv hydralazine ,    Small bowel obstructions/p  Surgery with post operative ileus  Type 2 diabetes minimal proteinuria noted  Worsening vision of lt eye evaluated by ophthamology  Hypernatremia  improving       Kimi Garza MD  3/2/2021 1:02 PM

## 2021-03-02 NOTE — PROGRESS NOTES
Physical Therapy  [facility]  Daily Treatment Note  NAME: Anat Bautista  : 1940  MRN: [id number]    Date of Service: 3/2/2021    Patient Diagnosis(es): The primary encounter diagnosis was SBO (small bowel obstruction) ([facility] [address] Diagnoses of EMELY (acute kidney injury) ([address] Generalized abdominal pain, Blind in both eyes, Supplemental oxygen dependent, and Elevated brain natriuretic peptide (BNP) level were also pertinent to this visit. has a past medical history of Blind right eye, CAD (coronary artery disease), CHF (congestive heart failure) ([facility] Chronic respiratory failure ([facility] [address] CKD (chronic kidney disease) stage 3, GFR 30-59 ml/min, Diabetes mellitus ([facility] [address] Diabetes mellitus type 2, controlled ([facility] [facility] HLD (hyperlipidemia), Hypertension, and TIA (transient ischemic attack). has a past surgical history that includes Coronary artery bypass graft (); Eye surgery; ECHO Compl W Dop Color Flow (2013); ECHO Compl W Dop Color Flow (2013); Cardiac surgery; Insert Picc Cath, 5/> Yrs (2021); and laparoscopy (N/A, 2021).    Referring Provider:  Manfred Reese MD        Evaluating Therapist: Val Menchaca PT        Room #:642  DIAGNOSIS: SBO  Additional Pertinent History:CAD, CHF, legally blind  PRECAUTIONS: falls, NG, O2, legally blind, alarm     Social:  Pt lives alone in a 2 floor plan. Has half bath on first floor  Prior to admission independent without device.         Initial Evaluation  Date:  Treatment     3/2/21 Short Term/ Long Term   Goals   Was pt agreeable to Eval/treatment? yes mitchell      Does pt have pain?  Sore throat       Bed Mobility  Rolling: mod assit  Supine to sit: mod assist of 2  Sit to supine: mod assist of 2  Scooting: mod assist of 2  NT Min assist   Transfers Sit to stand: mod assist of 2  Stand to sit: mod assist of 2  Stand pivot: NT Sit <> stand min assist with cues for hand placement Min assist   Ambulation    3 feet sidestepping with ww with mod assist of 2  25 feet with ww min assist 25 feet with ww with min assist   Stair Negotiation  Ascended and descended  NT       LE strength     3+/5     4-/5   balance      Fair at ww       AM-PAC Raw score               11/24  15/24        Patient education  Pt was educated on transfer technique    Patient response to education:   Pt verbalized understanding Pt demonstrated skill Pt requires further education in this area   yes With assist yes     Additional Comments/treatment: pt up in chair and agreeable to PT session. Instructed on transfer technique and walker safety. Min assist to ambulate with assist to guide walker due to visual deficit. Seated exercises x15 LAQ, ankle pumps and hip lfexion    Pt was left in chair with call light left by patient. Chair/bed alarm: yes    Time in: 1132  Time out: 1146    Total treatment time 14 minutes    Pt is making good progress toward established Physical Therapy goals. Continue with physical therapy current plan of care.     Valencia PT [id number]      CPT codes:  [] Low Complexity PT evaluation 46959  [] Moderate Complexity PT evaluation 43668  [] High Complexity PT evaluation 85555  [] PT Re-evaluation 15786  [] Gait training 56559  minutes  [] Manual therapy 99485    minutes  [x] Therapeutic activities 13911 14   minutes  [] Therapeutic exercises 21265     minutes  [] Neuromuscular reeducation 33880     minutes

## 2021-03-02 NOTE — PROGRESS NOTES
Comprehensive Nutrition Assessment    Type and Reason for Visit:  Reassess    Nutrition Recommendations/Plan: Continue Diet and ADAT once med appropriate. Will Start Ensure High Pro ONS BID. Continue Current PN and begin to wean once pt able to tolerate solid PO intake. Nutrition Assessment:  Pt slowly improving from a nutritional stand-point AEB pt tolerating PN at goal rate as well as tolerating Clear/Full Liquids thus far per EMR review. Remains at risk however d/t increased needs s/p Ex Lap SHALOM 2/26 2/2 SBO. Will Start ONS to aid in PO intake and recommend to continue TPN w/ eventual wean once able to tolerate solid foods w/ good intake. Malnutrition Assessment:  Malnutrition Status:  Insufficient data    Context:  Acute Illness     Findings of the 6 clinical characteristics of malnutrition:  Energy Intake:  7 - 50% or less of estimated energy requirements for 5 or more days  Weight Loss:  Unable to assess(2/2 poor EMR wt hx w/ no actual wt's pta and CHF/CKD hx w/ suspected fluid shifts)     Body Fat Loss:  Unable to assess     Muscle Mass Loss:  Unable to assess    Fluid Accumulation:  No significant fluid accumulation     Strength:  Not Performed    Estimated Daily Nutrient Needs:  Energy (kcal):  1950-8304(MSJ x 1.2 SF per ABW); Weight Used for Energy Requirements:  Admission     Protein (g):  (1.2-1.4 gm/kg per IBW as tolerated w/ EMELY/CKD);  Weight Used for Protein Requirements:  Ideal        Fluid (ml/day):  6452-6150; Method Used for Fluid Requirements:  1 ml/kcal      Nutrition Related Findings:  A&O, poor dentition, distended/non-tender Abd, Hypo BS, +constipation, No edema, -I/O's, +BM(Ex Lap w/ SHALOM 2/26)      Wounds:  Surgical Incision       Current Nutrition Therapies:    PN-Adult  3 IN 1 Central Line (Standard)  DIET FULL LIQUID;  PN-Adult  3 IN 1 Central Line (Standard)  Current Parenteral Nutrition Orders:  · Type and Formula: 3-in-1 Standard   · Lipids: None  · Duration: Continuous  · Rate/Volume: 75 ml/hr (Goal) = 1800 ml TV/d  · Current PN Order Provides: Same as Goal  · Goal PN Orders Provides: 1800 ml TV, 1863 kcals, 90 gm AA      Anthropometric Measures:  · Height: 5' 11\" (180.3 cm)  · Current Body Weight: 204 lb (92.5 kg)(actual 3/2)   · Admission Body Weight: 196 lb (88.9 kg)(stand 2/25)    · Usual Body Weight: 205 lb (93 kg)(unknown method 12/30/20 per EMR hx; DARION wt loss properly at this time d/t poor EMR wt hx w/ no actual wt's)     · Ideal Body Weight: 172 lbs; % Ideal Body Weight     · BMI: 28.5  · Adjusted Body Weight:  ; No Adjustment   · Adjusted BMI:      · BMI Categories: Overweight (BMI 25.0-29. 9)       Nutrition Diagnosis:   · Inadequate oral intake related to altered GI function(2/2 SBO) as evidenced by NPO or clear liquid status due to medical condition, nutrition support - parenteral nutrition, intake 0-25%, poor intake prior to admission, wounds, weight loss, GI abnormality, nausea, vomiting, constipation      Nutrition Interventions:   Food and/or Nutrient Delivery:  Continue Current Diet, Start Oral Nutrition Supplement, Continue Current Parenteral Nutrition(Continue Diet and ADAT once med appropriate. Will Start Ensure High Pro ONS BID. Continue Current PN and begin to wean once pt able to tolerate solid PO intake.)  Nutrition Education/Counseling:  Education not indicated   Coordination of Nutrition Care:  Continue to monitor while inpatient    Goals:  Pt to tolerate PN at goal; PO intake >50% of meals/ONS.        Nutrition Monitoring and Evaluation:   Behavioral-Environmental Outcomes:  None Identified   Food/Nutrient Intake Outcomes:  Diet Advancement/Tolerance, Food and Nutrient Intake, Supplement Intake, Parenteral Nutrition Intake/Tolerance  Physical Signs/Symptoms Outcomes:  Biochemical Data, Chewing or Swallowing, Constipation, GI Status, Nausea or Vomiting, Fluid Status or Edema, Nutrition Focused Physical Findings, Skin, Weight     Discharge Planning:     Too soon to determine     Electronically signed by Ivan Maciel RD, LD on 3/2/21 at 1:03 PM EST    Contact: ext [phone]

## 2021-03-02 NOTE — PROGRESS NOTES
Pt to low intmt wall suction overnight. 250mL output. GenSurg at bedside at 0600, ordered to remove NG tube. Removed without complications.

## 2021-03-02 NOTE — PROGRESS NOTES
GENERAL SURGERY  DAILY PROGRESS NOTE  3/2/2021    Subjective:  NGT was placed back to suction overnight, denies nausea, abdominal pain. Continues to pass gas and states he has had a BM. Objective:  BP (!) 162/64   Pulse 75   Temp 98.1 °F (36.7 °C) (Oral)   Resp 18   Ht 5' 11\" (1.803 m)   Wt 204 lb 12.8 oz (92.9 kg)   SpO2 93%   BMI 28.56 kg/m²     GENERAL:  Laying in bed, awake, alert, cooperative, no apparent distress  HEAD: Normocephalic, atraumatic  LUNGS:  No increased work of breathing  CARDIOVASCULAR:  RR  ABDOMEN:  Softly distended, non-tender. Incisions C/D/I. NGT in place with 250 output overnight  EXTREMITIES: No edema or swelling    Assessment/Plan:  [de-identified] y.o. 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    - Remove NGT  - Advance to FLD  - Continue TPN  - Adequate UOP, EMELY, appreciate nephrology recommendations  - Pain/nausea control prn  - Continue to monitor for continued bowel function    Electronically signed by Julisa Cerna MD on 3/2/2021 at 8:54 AM     The patient was seen and examined and the chart was reviewed. Overall, the patient is less distended and passing flatus. On examination: The abdomen is soft, slightly distended with an intact incision without evidence of infection.     Plan:  Advance diet

## 2021-03-02 NOTE — PROGRESS NOTES
Message sent to [place] to check if pt could d/c [facility].      Per [facility] pt is not appropriate yet for [facility] continue to monitor

## 2021-03-02 NOTE — PLAN OF CARE
Problem: Pain:  Goal: Pain level will decrease  Description: Pain level will decrease  3/1/2021 1033 by Concetta Jain RN  Outcome: Met This Shift     Problem: Skin Integrity:  Goal: Will show no infection signs and symptoms  Description: Will show no infection signs and symptoms  3/1/2021 1033 by Concetta Jain RN  Outcome: Met This Shift     Problem: Pain:  Goal: Control of acute pain  Description: Control of acute pain  3/1/2021 2328 by Enrrique Verdugo RN  Outcome: Ongoing  3/1/2021 1033 by Concetta Jain RN  Outcome: Met This Shift     Problem: Falls - Risk of:  Goal: Will remain free from falls  Description: Will remain free from falls  3/1/2021 2328 by Enrrique Verdugo RN  Outcome: Ongoing  3/1/2021 1033 by Concetta Jain RN  Outcome: Met This Shift  Goal: Absence of physical injury  Description: Absence of physical injury  3/1/2021 2328 by Enrrique Verdugo RN  Outcome: Ongoing  3/1/2021 1033 by Concetta Jain RN  Outcome: Met This Shift     Problem: Skin Integrity:  Goal: Absence of new skin breakdown  Description: Absence of new skin breakdown  3/1/2021 2328 by Enrrique Verdugo RN  Outcome: Ongoing  3/1/2021 1033 by Concetta Jain RN  Outcome: Met This Shift

## 2021-03-02 NOTE — PROGRESS NOTES
Min A   Bed Mobility  Supine-to-Sit: Mod A Supine-to-Sit: Mod Ax2  Sit-to-Supine: Mod Ax2  Min A supine to sit       Functional Transfers Sit-to-Stand: Min A Sit-to-Stand: Mod Ax2   from EOB Min A transfer from bed.   CGA   Functional Mobility Min A (with walker) small steps to arm chair; limited further d/t reported weakness/fatigue Mod Ax2 for few small side steps toward HOB   (with walker)  min A using w/w and assist to guide walker. Constant verbal cues due to poor vision. CGA with functional mobility (with device, as needed/appropriate) in order to maximize independence with ADLs/IADLs and other functional tasks. Balance Sitting: Fair     Standing: Fair-  (with walker) Sitting: Fair  (at EOB)  Standing: Poor+  (with walker) Static stand min A while receiving assistance with ADL. Sit balance on the EOB CGA for safety.   Fair dynamic standing balance during completion of ADLs/IADLs and other functional tasks. Activity Tolerance Poor  3L O2 throughout session. Limited  fair   Patient will demonstrate Good understanding and consistent implementation of energy conservation techniques and work simplification techniques into ADL/IADL routines. Visual/  Perceptual Legally blind - patient reports primarily black Impaired  Patient is legally blind.      N/A   B UE Strength 4-/5 4-/5   Patient will demonstrate 4+/5 B UE strength in order to maximize independence with ADLs/IADLs and functional transfers.        Comments:  Pt very pleasant and cooperative. Pt remained sitting upright in the chair. Alarm activated. Education/treatment:  ADL retraining with facilitation of movement to increase self care. Therapeutic activity to address balance, strength, and endurance for ADL and transfers. Pt education of daily orientation and transfer safety. · Pt has made  progress towards set goals. Updated Plan of Care: 2-5 days/week for 1-2 weeks PRN  [x]? ?ADL retraining/adaptive techniques and AE recommendations to increase functional independence within precautions  [x]? ? Energy conservation techniques to improve tolerance for ADLs/IADLs  [x]? ? Functional transfer/mobility training/DME recommendations for increased independence, safety and fall prevention  [x]? ?Patient/family education to increase safety and functional independence  [x]? ? Environmental modifications for safe mobility and completion of ADLs/IADLs  []? ?Cognitive retraining exercises to improve problem solving skills & safe participation in ADLs/IADLs   []? ?Sensory re-education techniques to improve extremity awareness, maintain skin integrity and improve hand function   []? ? Visual/Perceptual retraining  to improve body awareness and safety during transfers and ADLs   []? ? Splinting/positioning needs to maintain joint/skin integrity and prevent contractures   [x]? ? Therapeutic activity to improve functional performance during ADLs/IADLs  [x]? ? Therapeutic exercise to improve tolerance and functional strength for ADLs/IADLs  [x]? ? Balance retraining/tolerance tasks for facilitation of postural control with dynamic challenges during ADLs   [x]? ? Neuromuscular re-education: facilitate righting/equilibrium reactions, midline orientation, scapular stability/mobility, normalize muscle tone, and facilitate active functional movement/attention  []? ? Delirium prevention/treatment   []? Positioning to improve functional independence and prevent skin breakdown   []? ?Other:     Time In: 10:46  Time Out: 11:10       Min Units   Therapeutic Ex 24744     Therapeutic Activities 40032 18   6   ADL/Self Care 80153 14 1   Orthotic Management 58642     Neuro Re-Ed [id number]     Non-Billable Time     TOTAL TIMED TREATMENT 24 [facility] [id number]

## 2021-03-03 LAB
ANION GAP SERPL CALCULATED.3IONS-SCNC: 4 MMOL/L (ref 7–16)
BASOPHILS ABSOLUTE: 0.01 E9/L (ref 0–0.2)
BASOPHILS RELATIVE PERCENT: 0.1 % (ref 0–2)
BUN BLDV-MCNC: 21 MG/DL (ref 8–23)
CALCIUM SERPL-MCNC: 8.4 MG/DL (ref 8.6–10.2)
CHLORIDE BLD-SCNC: 109 MMOL/L (ref 98–107)
CO2: 28 MMOL/L (ref 22–29)
CREAT SERPL-MCNC: 1.3 MG/DL (ref 0.7–1.2)
EOSINOPHILS ABSOLUTE: 0.13 E9/L (ref 0.05–0.5)
EOSINOPHILS RELATIVE PERCENT: 1.5 % (ref 0–6)
GFR AFRICAN AMERICAN: >60
GFR NON-AFRICAN AMERICAN: >60 ML/MIN/1.73
GLUCOSE BLD-MCNC: 247 MG/DL (ref 74–99)
HCT VFR BLD CALC: 32 % (ref 37–54)
HEMOGLOBIN: 10.5 G/DL (ref 12.5–16.5)
IMMATURE GRANULOCYTES #: 0.07 E9/L
IMMATURE GRANULOCYTES %: 0.8 % (ref 0–5)
LYMPHOCYTES ABSOLUTE: 1.41 E9/L (ref 1.5–4)
LYMPHOCYTES RELATIVE PERCENT: 15.8 % (ref 20–42)
MCH RBC QN AUTO: 32.1 PG (ref 26–35)
MCHC RBC AUTO-ENTMCNC: 32.8 % (ref 32–34.5)
MCV RBC AUTO: 97.9 FL (ref 80–99.9)
METER GLUCOSE: 226 MG/DL (ref 74–99)
METER GLUCOSE: 227 MG/DL (ref 74–99)
METER GLUCOSE: 236 MG/DL (ref 74–99)
METER GLUCOSE: 249 MG/DL (ref 74–99)
METER GLUCOSE: 281 MG/DL (ref 74–99)
METER GLUCOSE: 291 MG/DL (ref 74–99)
METER GLUCOSE: 305 MG/DL (ref 74–99)
METER GLUCOSE: 341 MG/DL (ref 74–99)
MONOCYTES ABSOLUTE: 0.47 E9/L (ref 0.1–0.95)
MONOCYTES RELATIVE PERCENT: 5.3 % (ref 2–12)
NEUTROPHILS ABSOLUTE: 6.82 E9/L (ref 1.8–7.3)
NEUTROPHILS RELATIVE PERCENT: 76.5 % (ref 43–80)
PDW BLD-RTO: 13.2 FL (ref 11.5–15)
PLATELET # BLD: 81 E9/L (ref 130–450)
PLATELET CONFIRMATION: NORMAL
PMV BLD AUTO: 12.1 FL (ref 7–12)
POLYCHROMASIA: ABNORMAL
POTASSIUM SERPL-SCNC: 3.9 MMOL/L (ref 3.5–5)
RBC # BLD: 3.27 E12/L (ref 3.8–5.8)
SODIUM BLD-SCNC: 141 MMOL/L (ref 132–146)
WBC # BLD: 8.9 E9/L (ref 4.5–11.5)

## 2021-03-03 PROCEDURE — 36415 COLL VENOUS BLD VENIPUNCTURE: CPT

## 2021-03-03 PROCEDURE — 99232 SBSQ HOSP IP/OBS MODERATE 35: CPT | Performed by: INTERNAL MEDICINE

## 2021-03-03 PROCEDURE — 6360000002 HC RX W HCPCS: Performed by: INTERNAL MEDICINE

## 2021-03-03 PROCEDURE — 6370000000 HC RX 637 (ALT 250 FOR IP): Performed by: INTERNAL MEDICINE

## 2021-03-03 PROCEDURE — 2500000003 HC RX 250 WO HCPCS: Performed by: FAMILY MEDICINE

## 2021-03-03 PROCEDURE — 82962 GLUCOSE BLOOD TEST: CPT

## 2021-03-03 PROCEDURE — 94664 DEMO&/EVAL PT USE INHALER: CPT

## 2021-03-03 PROCEDURE — 2580000003 HC RX 258: Performed by: INTERNAL MEDICINE

## 2021-03-03 PROCEDURE — 97535 SELF CARE MNGMENT TRAINING: CPT

## 2021-03-03 PROCEDURE — APPSS30 APP SPLIT SHARED TIME 16-30 MINUTES: Performed by: NURSE PRACTITIONER

## 2021-03-03 PROCEDURE — 6370000000 HC RX 637 (ALT 250 FOR IP): Performed by: STUDENT IN AN ORGANIZED HEALTH CARE EDUCATION/TRAINING PROGRAM

## 2021-03-03 PROCEDURE — 97530 THERAPEUTIC ACTIVITIES: CPT

## 2021-03-03 PROCEDURE — 6370000000 HC RX 637 (ALT 250 FOR IP): Performed by: PSYCHIATRY & NEUROLOGY

## 2021-03-03 PROCEDURE — 2500000003 HC RX 250 WO HCPCS: Performed by: STUDENT IN AN ORGANIZED HEALTH CARE EDUCATION/TRAINING PROGRAM

## 2021-03-03 PROCEDURE — 6360000002 HC RX W HCPCS: Performed by: STUDENT IN AN ORGANIZED HEALTH CARE EDUCATION/TRAINING PROGRAM

## 2021-03-03 PROCEDURE — 2700000000 HC OXYGEN THERAPY PER DAY

## 2021-03-03 PROCEDURE — 85025 COMPLETE CBC W/AUTO DIFF WBC: CPT

## 2021-03-03 PROCEDURE — 2060000000 HC ICU INTERMEDIATE R&B

## 2021-03-03 PROCEDURE — 80048 BASIC METABOLIC PNL TOTAL CA: CPT

## 2021-03-03 RX ORDER — HYDRALAZINE HYDROCHLORIDE 25 MG/1
25 TABLET, FILM COATED ORAL EVERY 8 HOURS SCHEDULED
Status: DISCONTINUED | OUTPATIENT
Start: 2021-03-03 | End: 2021-03-04

## 2021-03-03 RX ORDER — CLONIDINE 0.2 MG/24H
1 PATCH, EXTENDED RELEASE TRANSDERMAL WEEKLY
Status: DISCONTINUED | OUTPATIENT
Start: 2021-03-03 | End: 2021-03-04 | Stop reason: HOSPADM

## 2021-03-03 RX ORDER — CARVEDILOL 25 MG/1
25 TABLET ORAL 2 TIMES DAILY
Status: DISCONTINUED | OUTPATIENT
Start: 2021-03-03 | End: 2021-03-04 | Stop reason: HOSPADM

## 2021-03-03 RX ORDER — BISACODYL 10 MG
10 SUPPOSITORY, RECTAL RECTAL DAILY
Status: DISCONTINUED | OUTPATIENT
Start: 2021-03-03 | End: 2021-03-04 | Stop reason: HOSPADM

## 2021-03-03 RX ADMIN — Medication 300 UNITS: at 09:51

## 2021-03-03 RX ADMIN — HYDRALAZINE HYDROCHLORIDE 25 MG: 25 TABLET, FILM COATED ORAL at 20:14

## 2021-03-03 RX ADMIN — INSULIN LISPRO 6 UNITS: 100 INJECTION, SOLUTION INTRAVENOUS; SUBCUTANEOUS at 09:51

## 2021-03-03 RX ADMIN — NITROGLYCERIN 0.5 INCH: 20 OINTMENT TOPICAL at 00:46

## 2021-03-03 RX ADMIN — LABETALOL HYDROCHLORIDE 30 MG: 5 INJECTION INTRAVENOUS at 00:46

## 2021-03-03 RX ADMIN — SODIUM CHLORIDE, PRESERVATIVE FREE 10 ML: 5 INJECTION INTRAVENOUS at 10:05

## 2021-03-03 RX ADMIN — HYDRALAZINE HYDROCHLORIDE 20 MG: 20 INJECTION INTRAMUSCULAR; INTRAVENOUS at 10:04

## 2021-03-03 RX ADMIN — BRIMONIDINE TARTRATE 1 DROP: 2 SOLUTION OPHTHALMIC at 15:42

## 2021-03-03 RX ADMIN — LANSOPRAZOLE 30 MG: KIT at 06:02

## 2021-03-03 RX ADMIN — INSULIN LISPRO 6 UNITS: 100 INJECTION, SOLUTION INTRAVENOUS; SUBCUTANEOUS at 15:42

## 2021-03-03 RX ADMIN — Medication 300 UNITS: at 20:15

## 2021-03-03 RX ADMIN — BRIMONIDINE TARTRATE 1 DROP: 2 SOLUTION OPHTHALMIC at 09:51

## 2021-03-03 RX ADMIN — INSULIN LISPRO 4 UNITS: 100 INJECTION, SOLUTION INTRAVENOUS; SUBCUTANEOUS at 23:33

## 2021-03-03 RX ADMIN — HYDRALAZINE HYDROCHLORIDE 25 MG: 25 TABLET, FILM COATED ORAL at 15:41

## 2021-03-03 RX ADMIN — INSULIN LISPRO 4 UNITS: 100 INJECTION, SOLUTION INTRAVENOUS; SUBCUTANEOUS at 17:59

## 2021-03-03 RX ADMIN — BISACODYL 10 MG: 10 SUPPOSITORY RECTAL at 15:41

## 2021-03-03 RX ADMIN — INSULIN LISPRO 4 UNITS: 100 INJECTION, SOLUTION INTRAVENOUS; SUBCUTANEOUS at 06:02

## 2021-03-03 RX ADMIN — POLYETHYLENE GLYCOL 3350 17 G: 17 POWDER, FOR SOLUTION ORAL at 09:50

## 2021-03-03 RX ADMIN — ENOXAPARIN SODIUM 30 MG: 100 INJECTION SUBCUTANEOUS at 09:51

## 2021-03-03 RX ADMIN — LATANOPROST 1 DROP: 50 SOLUTION OPHTHALMIC at 20:13

## 2021-03-03 RX ADMIN — CARVEDILOL 25 MG: 25 TABLET, FILM COATED ORAL at 11:16

## 2021-03-03 RX ADMIN — NITROGLYCERIN 0.5 INCH: 20 OINTMENT TOPICAL at 06:02

## 2021-03-03 RX ADMIN — IPRATROPIUM BROMIDE AND ALBUTEROL SULFATE 3 ML: .5; 3 SOLUTION RESPIRATORY (INHALATION) at 03:58

## 2021-03-03 RX ADMIN — SODIUM CHLORIDE, PRESERVATIVE FREE 10 ML: 5 INJECTION INTRAVENOUS at 20:14

## 2021-03-03 RX ADMIN — POTASSIUM CHLORIDE: 2 INJECTION, SOLUTION, CONCENTRATE INTRAVENOUS at 17:59

## 2021-03-03 RX ADMIN — INSULIN LISPRO 4 UNITS: 100 INJECTION, SOLUTION INTRAVENOUS; SUBCUTANEOUS at 02:04

## 2021-03-03 RX ADMIN — ATORVASTATIN CALCIUM 10 MG: 10 TABLET, FILM COATED ORAL at 09:51

## 2021-03-03 RX ADMIN — ASPIRIN 81 MG: 81 TABLET, CHEWABLE ORAL at 09:51

## 2021-03-03 RX ADMIN — CARVEDILOL 25 MG: 25 TABLET, FILM COATED ORAL at 20:14

## 2021-03-03 RX ADMIN — LABETALOL HYDROCHLORIDE 30 MG: 5 INJECTION INTRAVENOUS at 06:02

## 2021-03-03 RX ADMIN — TIMOLOL MALEATE 1 DROP: 5 SOLUTION/ DROPS OPHTHALMIC at 09:51

## 2021-03-03 RX ADMIN — HYDRALAZINE HYDROCHLORIDE 20 MG: 20 INJECTION INTRAMUSCULAR; INTRAVENOUS at 02:11

## 2021-03-03 RX ADMIN — TIMOLOL MALEATE 1 DROP: 5 SOLUTION/ DROPS OPHTHALMIC at 20:13

## 2021-03-03 ASSESSMENT — PAIN SCALES - GENERAL: PAINLEVEL_OUTOF10: 0

## 2021-03-03 NOTE — CARE COORDINATION
COVID negative 2/20. POD #5 EL, SHALOM. TPN continued via PICC. On room air- wears home O2 2lNC through [facility]. Pt is utilizing VA benefits for this hospital admission. Will utilize Medicare Part A on discharge. [facility] following-per surgery again today, pt is not ready for [facility] -awaiting return of bowel function. Pt is also accepted at [facility] pending progress- HOWEVER, they CANNOT accept pt on TPN . Will follow Charis Collins

## 2021-03-03 NOTE — PROGRESS NOTES
Pt successful for medium formed bowel movement 30 minutes after the insertion of a dulcolax supository

## 2021-03-03 NOTE — PROGRESS NOTES
Department of Internal Medicine  Nephrology Attending Progress Note        SUBJECTIVE: Mr. Danitza Whatley trying to move his bowels. ,  Renal function back to baseline, hypernatremia has resolved, tolerating liquid diet well  Physical Exam:    Vitals:    03/03/21 0545   BP: (!) 162/60   Pulse: 68   Resp:    Temp:    SpO2:        I/O last 24 hours:  Intake/Output inaccurate:    Weight: 207    General Appearance:  awake, alert, oriented, in mild distress, NG draining some blood-tinged fluid, poor oral hygiene  Skin: No rash  Neck:  neck- supple, no mass, non-tender and no bruits  Lungs: Decreased breath sounds at bases  Heart: Regular rhythm     Abdominal: Decreased bowel sounds  Extremities: trace edema  Peripheral Pulses:  +2    DATA:    CBC with Differential:    Lab Results   Component Value Date    WBC 8.9 03/03/2021    RBC 3.27 03/03/2021    HGB 10.5 03/03/2021    HCT 32.0 03/03/2021    PLT 81 03/03/2021    MCV 97.9 03/03/2021    MCH 32.1 03/03/2021    MCHC 32.8 03/03/2021    RDW 13.2 03/03/2021    NRBC 0.0 02/21/2021    SEGSPCT 56 02/03/2014    LYMPHOPCT 15.8 03/03/2021    MONOPCT 5.3 03/03/2021    BASOPCT 0.1 03/03/2021    MONOSABS 0.47 03/03/2021    LYMPHSABS 1.41 03/03/2021    EOSABS 0.13 03/03/2021    BASOSABS 0.01 03/03/2021     BMP:    Lab Results   Component Value Date     03/03/2021    K 3.9 03/03/2021    K 4.2 02/21/2021     03/03/2021    CO2 28 03/03/2021    BUN 21 03/03/2021    LABALBU 3.0 02/28/2021    CREATININE 1.3 03/03/2021    CALCIUM 8.4 03/03/2021    GFRAA >60 03/03/2021    LABGLOM >60 03/03/2021    GLUCOSE 247 03/03/2021          carvedilol  25 mg Oral BID    hydrALAZINE  25 mg Oral 3 times per day    cloNIDine  1 patch Transdermal Weekly    heparin flush  3 mL Intravenous 2 times per day    aspirin  81 mg Oral Daily    lansoprazole  30 mg Per NG tube Daily    ceFAZolin  2,000 mg Intravenous See Admin Instructions    atorvastatin  10 mg Oral Daily    brimonidine  1 drop Left Eye BID    latanoprost  1 drop Left Eye Nightly    timolol  1 drop Left Eye BID    sodium chloride flush  10 mL Intravenous 2 times per day    enoxaparin  30 mg Subcutaneous Daily    insulin lispro  0-12 Units Subcutaneous Q4H    influenza virus vaccine  0.5 mL Intramuscular Prior to discharge      PN-Adult  3 IN 1 Central Line (Standard) 75 mL/hr at 03/02/21 1838    dextrose       bisacodyl, sodium chloride flush, heparin flush, oxyCODONE **OR** oxyCODONE, HYDROmorphone **OR** HYDROmorphone, hydrALAZINE, morphine, phenol, trimethobenzamide, ipratropium-albuterol, nitroGLYCERIN, sodium chloride flush, polyethylene glycol, acetaminophen **OR** acetaminophen, glucose, dextrose, glucagon (rDNA), dextrose    IMPRESSION/RECOMMENDATIONS:      Acute kidney injury superimposed on CKD 3-A, nonoliguric, creatinine improving, creatinine close to baseline  Volume overload mild, due to increased third spacing have held on diuretic therapy  Hypokalemia improving  Hypocalcemia  improving  Hypertension resume some oral medicines but will hold off on minoxidil , continue as needed hydralazine  small bowel obstruction s/p  surgery with post operative ileus  Type 2 diabetes minimal proteinuria noted  Worsening vision of lt eye evaluated by ophthamology  Hypernatremia  improving       Jeronimo Craven MD  3/3/2021 9:35 AM

## 2021-03-03 NOTE — PROGRESS NOTES
[address] Hospitalist   Progress Note    Admitting Date and Time: 2/20/2021  5:15 PM  Admit Dx: Intestinal obstruction (HCC) [K56.609]    Subjective:    Pt sitting up in bed in no acute distress. Patient states he feels well today. Was up in the chair this morning. Patient states he continues to pass gas but has not had bowel movement as of yet. Per RN: No new concerns noted. Awaiting possible DC to [facility]. ROS: denies fever, chills, cp, sob, n/v, HA unless stated above.      nitroglycerin  0.5 inch Topical 4 times per day    heparin flush  3 mL Intravenous 2 times per day    aspirin  81 mg Oral Daily    lansoprazole  30 mg Per NG tube Daily    labetalol  30 mg Intravenous Q6H    ceFAZolin  2,000 mg Intravenous See Admin Instructions    atorvastatin  10 mg Oral Daily    brimonidine  1 drop Left Eye BID    latanoprost  1 drop Left Eye Nightly    timolol  1 drop Left Eye BID    sodium chloride flush  10 mL Intravenous 2 times per day    enoxaparin  30 mg Subcutaneous Daily    insulin lispro  0-12 Units Subcutaneous Q4H    influenza virus vaccine  0.5 mL Intramuscular Prior to discharge         bisacodyl, 10 mg, Daily PRN      sodium chloride flush, 10 mL, PRN      heparin flush, 3 mL, PRN      oxyCODONE, 5 mg, Q4H PRN    Or      oxyCODONE, 10 mg, Q4H PRN      HYDROmorphone, 0.5 mg, Q4H PRN    Or      HYDROmorphone, 1 mg, Q4H PRN      hydrALAZINE, 20 mg, Q6H PRN      morphine, 1 mg, Q4H PRN      phenol, 1 spray, Q2H PRN      trimethobenzamide, 200 mg, Q6H PRN      ipratropium-albuterol, 3 mL, Q6H PRN      nitroGLYCERIN, 0.4 mg, Q5 Min PRN      sodium chloride flush, 10 mL, PRN      polyethylene glycol, 17 g, Daily PRN      acetaminophen, 650 mg, Q6H PRN    Or      acetaminophen, 650 mg, Q6H PRN      glucose, 15 g, PRN      dextrose, 12.5 g, PRN      glucagon (rDNA), 1 mg, PRN      dextrose, 100 mL/hr, PRN         Objective:    BP (!) 162/60   Pulse 68 motion abnormality consistent with a   prior myocardial infarction. No reversible ischemia is seen. The calculated ejection fraction 64%. However, visually the left   ventricle appears to be severely dilated with moderate global   hypokinesis and severe hypokinesis of the inferolateral wall. Consider   an echocardiogram if clinically indicated. MRI BRAIN W WO CONTRAST   Final Result   1. Questionable punctate 4 mm diameter focus of acute ischemia involving the   mid left frontal lobe deep white matter. 2. Moderate cerebral white matter chronic microvascular ischemic disease. 3. Mild diffuse cerebral atrophy. CT HEAD WO CONTRAST   Final Result   1. There is no acute intracranial abnormality. Specifically, there is no   intracranial hemorrhage. 2. Atrophy and periventricular leukomalacia,   3. Given the symptoms of visual loss an asses of any infarct within the   occipital lobes or suprasellar mass dedicated MRI of the brain is recommended   for further evaluation. XR ABDOMEN FOR NG/OG/NE TUBE PLACEMENT   Final Result   Satisfactory position of nasogastric tube following advancement. XR ABDOMEN (KUB) (SINGLE AP VIEW)   Final Result   The tip of the NG tube is at the level of the diaphragm. Please advance the   NG tube by an additional 10 cm   Multiple dilated loops of small bowel consistent with small bowel obstruction. CT ABDOMEN PELVIS WO CONTRAST Additional Contrast? Oral   Final Result   Persistent high-grade small-bowel obstruction with significantly dilated   proximal small bowel loops with the transition point in the right lower   quadrant with the swirling appearance in the mesentery. Mesenteric or   midgrade volvulus or adhesions/stricture is considered. Decompression is   recommended. XR ABDOMEN FOR NG/OG/NE TUBE PLACEMENT   Final Result   Satisfactory position of enteric tube.       CT ABDOMEN PELVIS WO CONTRAST Additional Contrast? None   Final Result   Dilatation therapy. 7. DM-A1c 7.6  Glucose 270 Continue SSI/hypoglycemic protocol/POCGT. Follow Adjust as needed. 8.  HTN-okay to resume oral medication. Do not resume minoxidil. Hydralazine IV as needed. Follow closely adjust as needed. 9. ?Left Frontal Lobe ischemia-Acute-subacute ischemic stroke left middle cerebral artery territory likely lacunar infarction due to small vessel disease. Continue ASA/statin therapy. Carotid US Right internal artery 50-69%. Left internal Artery 70-99%. Bilateral vertebral arteries are patent with flow in normal direction. If Delay in carotid intervention, Plavix will need added to ASA therapy. Per Neurology. Neurology/vascular Surgery input appreciated. 10. Left eye vision loss-glaucoma specialist in [facility] for follow-up as outpatient. Likely loss of vision 2/2 vascular disease per ophthalmology. 11. Bilateral Carotid Stenosis- Carotid US Right internal artery 50-69%. Left internal Artery 70-99%. Bilateral vertebral arteries are patent with flow in normal direction. Dual Platelet therapy recommended. No immediate plans for Carotid endarterectomy per Vascular Surgery. Vascular surgery input appreciated. \  12. Disposition-SNF/[facility]ACH. [facility]. Continue to follow. Case management following. NOTE: This report was transcribed using voice recognition software. Every effort was made to ensure accuracy; however, inadvertent computerized transcription errors may be present. Electronically signed by Amando Thomas CNP on 3/3/2021 at 7:34 AM  HOSPITALIST ATTENDING PHYSICIAN NOTE 3/3/2021 2008PM:    Details of the evaluation - subjective assessment (including medication profile, past medical, family and social history when applicable), examination, review of lab and test data, diagnostic impressions and medical decision making - performed by Nina Canavan.  Yovany Thomas CNP, were discussed with me on the date of service and I agree with clinical information herein unless otherwise noted. The patient has been evaluated by me personally earlier today. Pt reports no fevers, chills,n/v    Exam: heart reg at rate of 64,lungs cta, abd pos bs soft nt, ext neg for le edema    I agree with the assessment and plan of Juanda Sport. Cecilio Griffin - KRYSTAL    sbo  Hypernatremia  cva likely lacunar infarction  itz  Dm type 2 uncontrolled  htn  Left eye vision loss  hypophosphatemia  Hypokalemia  Thrombocytopenia  hyperlipidemia      Electronically signed by Ken Gonzalez, D.O.   Hospitalist  [facility] Hospitalist Service at [facility]

## 2021-03-03 NOTE — PLAN OF CARE
Problem: Pain:  Goal: Control of acute pain  Description: Control of acute pain  Outcome: Ongoing     Problem: Falls - Risk of:  Goal: Will remain free from falls  Description: Will remain free from falls  Outcome: Ongoing  Goal: Absence of physical injury  Description: Absence of physical injury  Outcome: Ongoing

## 2021-03-03 NOTE — PROGRESS NOTES
Physical Therapy  Facility/Department: [facility] INTERMEDIATE  Daily Treatment Note  NAME: Nechama Denver  : 1940  MRN: [id number]    Date of Service: 3/3/2021      Patient Diagnosis(es): The primary encounter diagnosis was SBO (small bowel obstruction) ([facility] Diagnoses of EMELY (acute kidney injury) ([facility] Generalized abdominal pain, Blind in both eyes, Supplemental oxygen dependent, and Elevated brain natriuretic peptide (BNP) level were also pertinent to this visit. has a past medical history of Blind right eye, CAD (coronary artery disease), CHF (congestive heart failure) ([facility] Chronic respiratory failure ([facility] CKD (chronic kidney disease) stage 3, GFR 30-59 ml/min, Diabetes mellitus ([facility] Diabetes mellitus type 2, controlled ([facility] HLD (hyperlipidemia), Hypertension, and TIA (transient ischemic attack). has a past surgical history that includes Coronary artery bypass graft (); Eye surgery; ECHO Compl W Dop Color Flow (2013); ECHO Compl W Dop Color Flow (2013); Cardiac surgery; Insert Picc Cath, 5/> Yrs (2021); and laparoscopy (N/A, 2021). Referring Makayla Ibrahim MD        Evaluating Therapist: Ashwini Hyde PT        Room #:642  DIAGNOSIS: SBO  Additional Pertinent History:CAD, CHF, legally blind  PRECAUTIONS: falls, NG, O2, legally blind, alarm     Social:  Pt lives alone in a 2 floor plan. Has half bath on first floor  Prior to admission independent without device.         Initial Evaluation  Date:  Treatment    3/3 Short Term/ Long Term   Goals   Was pt agreeable to Eval/treatment? yes yes     Does pt have pain?  Sore throat       Bed Mobility  Rolling: mod assit  Supine to sit: mod assist of 2  Sit to supine: mod assist of 2  Scooting: mod assist of 2 Rolling:  SBA  Supine to sit:  SBA  Sit to supine:  NT  Scooting:  SBA to sitting EOB Min assist   Transfers Sit to stand: mod assist of 2  Stand to sit: mod assist of 2  Stand pivot: NT Sit to stand:  [address] A  Stand to sit:  Min A  Stand pivot:  Min A Min assist   Ambulation    3 feet sidestepping with ww with mod assist of 2 15 feet and 40 feet with w/w Min A 25 feet with ww with min assist   Stair Negotiation  Ascended and descended  NT       LE strength     3+/5     4-/5   balance      Fair at ww       AM-PAC Raw score               11/24 16/24        Patient education  Pt educated on PT objectives during treatment session, hand placement during transfers, w/w usage and safety. Patient response to education:   Pt verbalized understanding Pt demonstrated skill Pt requires further education in this area   Yes  With cueing yes     ASSESSMENT:    Comments:  Pt found in bed and left sitting up in the chair with call light in reach and chair alarm on. Treatment:  Patient practiced and was instructed in the following treatment:    Functional mobility performed as documented above. Pt reported feeling lightheaded during all functional mobility. Cueing required for hand placement during transfers. No LOB during ambulation. Assist required for w/w management and direction changes due to decreased vision. PLAN:    Patient is making good progress towards established goals. Will continue with current POC.      Time in  1019  Time out  1040    Total Treatment Time  15 minutes     CPT codes:    [x] Therapeutic activities 77269 15minutes  [] Therapeutic exercises 85702  minutes      Artur Rudd, [address]

## 2021-03-03 NOTE — PROGRESS NOTES
GENERAL SURGERY  DAILY PROGRESS NOTE  3/3/2021    Subjective:  Patient states he is feeling fine, denies nausea, vomiting. States he is passing gas, no BM yesterday. Objective:  BP (!) 174/73   Pulse 71   Temp 98.5 °F (36.9 °C) (Oral)   Resp 18   Ht 5' 11\" (1.803 m)   Wt 207 lb 3.2 oz (94 kg)   SpO2 94%   BMI 28.90 kg/m²     GENERAL:  Laying in bed, awake, alert, cooperative, no apparent distress  HEAD: Normocephalic, atraumatic  LUNGS:  No increased work of breathing  CARDIOVASCULAR:  RR  ABDOMEN:  Softly distended, non-tender.  Incisions C/D/I.   EXTREMITIES: No edema or swelling    Assessment/Plan:  [de-identified] y.o. 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    - Advance to low fiber diet  - 1/2 TPN  - Adequate UOP, EMELY, appreciate nephrology recommendations  - Pain/nausea control prn  - Continue to monitor for continued bowel function    Electronically signed by Miriam Jensen MD on 3/3/2021 at 11:24 AM     The patient was seen and examined  The chart was reviewed  Agree with the assessment and plan

## 2021-03-03 NOTE — PROGRESS NOTES
Mod A Supine-to-Sit: Mod Ax2  Sit-to-Supine: Mod Ax2        Functional Transfers Sit-to-Stand: Min A Sit-to-Stand: Mod Ax2   from EOB Min A and cues for hand placement toilet and chair transfers. CGA   Functional Mobility Min A (with walker) small steps to arm chair; limited further d/t reported weakness/fatigue Mod Ax2 for few small side steps toward HOB   (with walker)  min A using w/w and assist to guide walker. Constant verbal cues due to poor vision. CGA with functional mobility (with device, as needed/appropriate) in order to maximize independence with ADLs/IADLs and other functional tasks. Balance Sitting: Fair     Standing: Fair-  (with walker) Sitting: Fair  (at EOB)  Standing: Poor+  (with walker) Static stand min A while receiving assistance with ADL. Sit balance on the EOB CGA for safety.   Fair dynamic standing balance during completion of ADLs/IADLs and other functional tasks. Activity Tolerance Poor  3L O2 throughout session. Limited  fair   Patient will demonstrate Good understanding and consistent implementation of energy conservation techniques and work simplification techniques into ADL/IADL routines. Visual/  Perceptual Legally blind - patient reports primarily black Impaired  Patient is legally blind.      N/A   B UE Strength 4-/5 4-/5   Patient will demonstrate 4+/5 B UE strength in order to maximize independence with ADLs/IADLs and functional transfers.        Comments:  Pt very pleasant and cooperative. Fair tolerance for trunk flexion during ADL activity. Pt verbalized concern with learning how to function/walk with his blindness. Requires constant verbal/tactile cues due to visual impairment. Remained in chair at end of the session. Education/treatment:  ADL retraining with facilitation of movement to increase self care. Therapeutic activity to address balance, strength, and endurance for ADL and transfers.   Pt education of daily orientation and transfer safety. · Pt has made  progress towards set goals. Updated Plan of Care: 2-5 days/week for 1-2 weeks PRN  [x]? ?ADL retraining/adaptive techniques and AE recommendations to increase functional independence within precautions  [x]? ? Energy conservation techniques to improve tolerance for ADLs/IADLs  [x]? ? Functional transfer/mobility training/DME recommendations for increased independence, safety and fall prevention  [x]? ?Patient/family education to increase safety and functional independence  [x]? ? Environmental modifications for safe mobility and completion of ADLs/IADLs  []? ?Cognitive retraining exercises to improve problem solving skills & safe participation in ADLs/IADLs   []? ?Sensory re-education techniques to improve extremity awareness, maintain skin integrity and improve hand function   []? ? Visual/Perceptual retraining  to improve body awareness and safety during transfers and ADLs   []? ? Splinting/positioning needs to maintain joint/skin integrity and prevent contractures   [x]? ? Therapeutic activity to improve functional performance during ADLs/IADLs  [x]? ? Therapeutic exercise to improve tolerance and functional strength for ADLs/IADLs  [x]? ? Balance retraining/tolerance tasks for facilitation of postural control with dynamic challenges during ADLs   [x]? ? Neuromuscular re-education: facilitate righting/equilibrium reactions, midline orientation, scapular stability/mobility, normalize muscle tone, and facilitate active functional movement/attention  []? ? Delirium prevention/treatment   []? Positioning to improve functional independence and prevent skin breakdown   []? ?Other:     Time In: 11:23   Time Out: 11:46       Min Units   Therapeutic Ex 69787     Therapeutic Activities 81747 6   4   ADL/Self Care 21598 14 1   Orthotic Management 96476     Neuro Re-Ed 47098     Non-Billable Time     TOTAL TIMED TREATMENT 23 [facility] HORTENCIA/L 51381

## 2021-03-04 VITALS
BODY MASS INDEX: 29.01 KG/M2 | DIASTOLIC BLOOD PRESSURE: 78 MMHG | OXYGEN SATURATION: 93 % | SYSTOLIC BLOOD PRESSURE: 185 MMHG | RESPIRATION RATE: 18 BRPM | HEART RATE: 70 BPM | TEMPERATURE: 97.9 F | WEIGHT: 207.2 LBS | HEIGHT: 71 IN

## 2021-03-04 LAB
ANION GAP SERPL CALCULATED.3IONS-SCNC: 5 MMOL/L (ref 7–16)
BASOPHILS ABSOLUTE: 0.01 E9/L (ref 0–0.2)
BASOPHILS RELATIVE PERCENT: 0.1 % (ref 0–2)
BUN BLDV-MCNC: 23 MG/DL (ref 8–23)
CALCIUM SERPL-MCNC: 8.6 MG/DL (ref 8.6–10.2)
CHLORIDE BLD-SCNC: 110 MMOL/L (ref 98–107)
CO2: 27 MMOL/L (ref 22–29)
CREAT SERPL-MCNC: 1.3 MG/DL (ref 0.7–1.2)
EOSINOPHILS ABSOLUTE: 0.11 E9/L (ref 0.05–0.5)
EOSINOPHILS RELATIVE PERCENT: 1.2 % (ref 0–6)
GFR AFRICAN AMERICAN: >60
GFR NON-AFRICAN AMERICAN: >60 ML/MIN/1.73
GLUCOSE BLD-MCNC: 202 MG/DL (ref 74–99)
HCT VFR BLD CALC: 31.6 % (ref 37–54)
HEMOGLOBIN: 10.4 G/DL (ref 12.5–16.5)
IMMATURE GRANULOCYTES #: 0.06 E9/L
IMMATURE GRANULOCYTES %: 0.7 % (ref 0–5)
LYMPHOCYTES ABSOLUTE: 1.34 E9/L (ref 1.5–4)
LYMPHOCYTES RELATIVE PERCENT: 15 % (ref 20–42)
MCH RBC QN AUTO: 31.4 PG (ref 26–35)
MCHC RBC AUTO-ENTMCNC: 32.9 % (ref 32–34.5)
MCV RBC AUTO: 95.5 FL (ref 80–99.9)
METER GLUCOSE: 136 MG/DL (ref 74–99)
METER GLUCOSE: 192 MG/DL (ref 74–99)
METER GLUCOSE: 254 MG/DL (ref 74–99)
METER GLUCOSE: 287 MG/DL (ref 74–99)
MONOCYTES ABSOLUTE: 0.53 E9/L (ref 0.1–0.95)
MONOCYTES RELATIVE PERCENT: 5.9 % (ref 2–12)
NEUTROPHILS ABSOLUTE: 6.86 E9/L (ref 1.8–7.3)
NEUTROPHILS RELATIVE PERCENT: 77.1 % (ref 43–80)
PDW BLD-RTO: 13.2 FL (ref 11.5–15)
PLATELET # BLD: 91 E9/L (ref 130–450)
PLATELET CONFIRMATION: NORMAL
PMV BLD AUTO: 12.1 FL (ref 7–12)
POTASSIUM SERPL-SCNC: 4 MMOL/L (ref 3.5–5)
RBC # BLD: 3.31 E12/L (ref 3.8–5.8)
SODIUM BLD-SCNC: 142 MMOL/L (ref 132–146)
WBC # BLD: 8.9 E9/L (ref 4.5–11.5)

## 2021-03-04 PROCEDURE — 85025 COMPLETE CBC W/AUTO DIFF WBC: CPT

## 2021-03-04 PROCEDURE — 36415 COLL VENOUS BLD VENIPUNCTURE: CPT

## 2021-03-04 PROCEDURE — 2580000003 HC RX 258: Performed by: INTERNAL MEDICINE

## 2021-03-04 PROCEDURE — 6370000000 HC RX 637 (ALT 250 FOR IP): Performed by: STUDENT IN AN ORGANIZED HEALTH CARE EDUCATION/TRAINING PROGRAM

## 2021-03-04 PROCEDURE — 6360000002 HC RX W HCPCS: Performed by: STUDENT IN AN ORGANIZED HEALTH CARE EDUCATION/TRAINING PROGRAM

## 2021-03-04 PROCEDURE — 6370000000 HC RX 637 (ALT 250 FOR IP): Performed by: INTERNAL MEDICINE

## 2021-03-04 PROCEDURE — 99239 HOSP IP/OBS DSCHRG MGMT >30: CPT | Performed by: INTERNAL MEDICINE

## 2021-03-04 PROCEDURE — 82962 GLUCOSE BLOOD TEST: CPT

## 2021-03-04 PROCEDURE — 6360000002 HC RX W HCPCS: Performed by: INTERNAL MEDICINE

## 2021-03-04 PROCEDURE — G0008 ADMIN INFLUENZA VIRUS VAC: HCPCS | Performed by: FAMILY MEDICINE

## 2021-03-04 PROCEDURE — 90694 VACC AIIV4 NO PRSRV 0.5ML IM: CPT | Performed by: FAMILY MEDICINE

## 2021-03-04 PROCEDURE — 80048 BASIC METABOLIC PNL TOTAL CA: CPT

## 2021-03-04 PROCEDURE — 97530 THERAPEUTIC ACTIVITIES: CPT

## 2021-03-04 PROCEDURE — 6370000000 HC RX 637 (ALT 250 FOR IP): Performed by: PSYCHIATRY & NEUROLOGY

## 2021-03-04 PROCEDURE — 6360000002 HC RX W HCPCS: Performed by: FAMILY MEDICINE

## 2021-03-04 RX ORDER — BISACODYL 10 MG
10 SUPPOSITORY, RECTAL RECTAL DAILY
Qty: 30 SUPPOSITORY | Refills: 0 | DISCHARGE
Start: 2021-03-05 | End: 2021-04-04

## 2021-03-04 RX ORDER — HYDRALAZINE HYDROCHLORIDE 50 MG/1
50 TABLET, FILM COATED ORAL EVERY 8 HOURS SCHEDULED
Qty: 90 TABLET | Refills: 3 | DISCHARGE
Start: 2021-03-04 | End: 2022-02-15 | Stop reason: DRUGHIGH

## 2021-03-04 RX ORDER — OXYCODONE HYDROCHLORIDE 5 MG/1
5 TABLET ORAL EVERY 4 HOURS PRN
Refills: 0 | Status: SHIPPED | DISCHARGE
Start: 2021-03-04 | End: 2021-03-07

## 2021-03-04 RX ORDER — TORSEMIDE 20 MG/1
20 TABLET ORAL DAILY
Status: DISCONTINUED | OUTPATIENT
Start: 2021-03-04 | End: 2021-03-04 | Stop reason: HOSPADM

## 2021-03-04 RX ORDER — ASPIRIN 81 MG/1
81 TABLET, CHEWABLE ORAL DAILY
Qty: 30 TABLET | Refills: 3 | DISCHARGE
Start: 2021-03-05

## 2021-03-04 RX ORDER — POLYETHYLENE GLYCOL 3350 17 G/17G
17 POWDER, FOR SOLUTION ORAL DAILY PRN
Qty: 527 G | Refills: 1 | DISCHARGE
Start: 2021-03-04 | End: 2021-04-03

## 2021-03-04 RX ORDER — HYDRALAZINE HYDROCHLORIDE 50 MG/1
50 TABLET, FILM COATED ORAL EVERY 8 HOURS SCHEDULED
Status: DISCONTINUED | OUTPATIENT
Start: 2021-03-04 | End: 2021-03-04 | Stop reason: HOSPADM

## 2021-03-04 RX ADMIN — ENOXAPARIN SODIUM 30 MG: 100 INJECTION SUBCUTANEOUS at 09:39

## 2021-03-04 RX ADMIN — INSULIN LISPRO 2 UNITS: 100 INJECTION, SOLUTION INTRAVENOUS; SUBCUTANEOUS at 03:00

## 2021-03-04 RX ADMIN — CARVEDILOL 25 MG: 25 TABLET, FILM COATED ORAL at 09:38

## 2021-03-04 RX ADMIN — SODIUM CHLORIDE, PRESERVATIVE FREE 10 ML: 5 INJECTION INTRAVENOUS at 09:39

## 2021-03-04 RX ADMIN — INSULIN LISPRO 6 UNITS: 100 INJECTION, SOLUTION INTRAVENOUS; SUBCUTANEOUS at 15:26

## 2021-03-04 RX ADMIN — ASPIRIN 81 MG: 81 TABLET, CHEWABLE ORAL at 09:38

## 2021-03-04 RX ADMIN — HYDRALAZINE HYDROCHLORIDE 20 MG: 20 INJECTION INTRAMUSCULAR; INTRAVENOUS at 09:41

## 2021-03-04 RX ADMIN — ATORVASTATIN CALCIUM 10 MG: 10 TABLET, FILM COATED ORAL at 09:38

## 2021-03-04 RX ADMIN — TIMOLOL MALEATE 1 DROP: 5 SOLUTION/ DROPS OPHTHALMIC at 09:38

## 2021-03-04 RX ADMIN — HYDRALAZINE HYDROCHLORIDE 25 MG: 25 TABLET, FILM COATED ORAL at 04:15

## 2021-03-04 RX ADMIN — HYDRALAZINE HYDROCHLORIDE 50 MG: 50 TABLET, FILM COATED ORAL at 15:25

## 2021-03-04 RX ADMIN — BRIMONIDINE TARTRATE 1 DROP: 2 SOLUTION OPHTHALMIC at 09:38

## 2021-03-04 RX ADMIN — A/SINGAPORE/GP1908/2015 IVR-180 (AN A/MICHIGAN/45/2015 (H1N1)PDM09-LIKE VIRUS, A/HONG KONG/4801/2014, NYMC X-263B (H3N2) (AN A/HONG KONG/4801/2014-LIKE VIRUS), AND B/BRISBANE/60/2008, WILD TYPE (A B/BRISBANE/60/2008-LIKE VIRUS) 0.5 ML: 15; 15; 15 INJECTION, SUSPENSION INTRAMUSCULAR at 16:42

## 2021-03-04 RX ADMIN — Medication 300 UNITS: at 09:39

## 2021-03-04 RX ADMIN — BISACODYL 10 MG: 10 SUPPOSITORY RECTAL at 09:38

## 2021-03-04 RX ADMIN — INSULIN LISPRO 6 UNITS: 100 INJECTION, SOLUTION INTRAVENOUS; SUBCUTANEOUS at 10:04

## 2021-03-04 ASSESSMENT — PAIN SCALES - GENERAL: PAINLEVEL_OUTOF10: 0

## 2021-03-04 NOTE — CARE COORDINATION
COVID negative 2/20. POD #6 EL, SHALOM. TPN continued via PICC. Pt had BM as per nursing note. Message via answering service sent to Dr. Shirley Ledbetter regarding if pt can discharge to [facility] today-awaiting response. [facility] also following- unable to accept TPN.  Will follow  Anum Hopkins

## 2021-03-04 NOTE — ACP (ADVANCE CARE PLANNING)
Discharge to [facility] 725 @ 4pm. Nursing notified.  RICKIE left w/ Manpreet [phone] informing of discharge destination and room # Millicent Sensor

## 2021-03-04 NOTE — PROGRESS NOTES
Department of Internal Medicine  Nephrology Attending Progress Note        SUBJECTIVE: Mr. Emmett Senior is being seen for EMELY and Hypernatremia    3/4/21:Pt states he feels tired, denies abd pain    Physical Exam:    Vitals:    21   BP: (!) 172/84   Pulse: 74   Resp: 16   Temp: 97.9 °F (36.6 °C)   SpO2: 95%       VITALS:  BP (!) 172/84   Pulse 74   Temp 97.9 °F (36.6 °C) (Oral)   Resp 16   Ht 5' 11\" (1.803 m)   Wt 207 lb 3.2 oz (94 kg)   SpO2 95%   BMI 28.90 kg/m²   CURRENT TEMPERATURE:  Temp: 97.9 °F (36.6 °C)  MAXIMUM TEMPERATURE OVER 24HRS:  Temp (24hrs), Av.3 °F (36.8 °C), Min:97.9 °F (36.6 °C), Max:98.5 °F (36.9 °C)    TEMPERATURE RANGE OVER 24HRS:   Temp  Av.3 °F (36.8 °C)  Min: 97.9 °F (36.6 °C)  Max: 98.5 °F (36.9 °C)  TEMPERATURE RANGE OVER 8HRS:   Temp  Av.9 °F (36.6 °C)  Min: 97.9 °F (36.6 °C)  Max: 97.9 °F (36.6 °C)  CURRENT RESPIRATORY RATE:  Resp: 16  24HR RESPIRATORY RATE RANGE:  Resp  Av.5  Min: 16  Max: 18  CURRENT PULSE:  Pulse: 74  24HR PULSE RANGE: Pulse  Av  Min: 65  Max: 74  CURRENT BLOOD PRESSURE:  BP: (!) 172/84    24HR BLOOD PRESSURE RANGE:  Systolic (99YUV), BB , Min:142 , DRK:539   ; Diastolic (83VGR), RXE:02, Min:60, Max:88    8HR BLOOD PRESSURE RANGE:  BP: (172)/(84)   CURRENT PULSE OXIMETRY:  SpO2: 95 %  24HR PULSE OXIMETRY RANGE:  SpO2  Av %  Min: 94 %  Max: 97 %  8HR PULSE OXIMETRY RANGE:  SpO2  Av %  Min: 95 %  Max: 95 %  24HR INTAKE/OUTPUT:    Intake/Output Summary (Last 24 hours) at 3/3/2021 2202  Last data filed at 3/3/2021 1725  Gross per 24 hour   Intake --   Output 850 ml   Net -850 ml     8HR INTAKE OUTPUT:  In: -   Out: 450 [Urine:450]  VENT SETTINGS:    Vent Information  Skin Assessment: Clean, dry, & intact  SpO2: 95 %  Additional Respiratory  Assessments  Pulse: 74  Resp: 16  SpO2: 95 %    General Appearance:  awake, alert, oriented, in mild distress, NG draining some blood-tinged fluid, poor oral hygiene  Skin: No rash  Neck:  neck- supple, no mass, non-tender and no bruits  Lungs: Decreased breath sounds at bases  Heart: Regular rhythm     Abdominal: Decreased bowel sounds  Extremities: trace edema  Peripheral Pulses:  +2    DATA:    CBC with Differential:    Lab Results   Component Value Date    WBC 8.9 03/03/2021    RBC 3.27 03/03/2021    HGB 10.5 03/03/2021    HCT 32.0 03/03/2021    PLT 81 03/03/2021    MCV 97.9 03/03/2021    MCH 32.1 03/03/2021    MCHC 32.8 03/03/2021    RDW 13.2 03/03/2021    NRBC 0.0 02/21/2021    SEGSPCT 56 02/03/2014    LYMPHOPCT 15.8 03/03/2021    MONOPCT 5.3 03/03/2021    BASOPCT 0.1 03/03/2021    MONOSABS 0.47 03/03/2021    LYMPHSABS 1.41 03/03/2021    EOSABS 0.13 03/03/2021    BASOSABS 0.01 03/03/2021     BMP:    Lab Results   Component Value Date     03/03/2021    K 3.9 03/03/2021    K 4.2 02/21/2021     03/03/2021    CO2 28 03/03/2021    BUN 21 03/03/2021    LABALBU 3.0 02/28/2021    CREATININE 1.3 03/03/2021    CALCIUM 8.4 03/03/2021    GFRAA >60 03/03/2021    LABGLOM >60 03/03/2021    GLUCOSE 247 03/03/2021          carvedilol  25 mg Oral BID    hydrALAZINE  25 mg Oral 3 times per day    cloNIDine  1 patch Transdermal Weekly    bisacodyl  10 mg Rectal Daily    heparin flush  3 mL Intravenous 2 times per day    aspirin  81 mg Oral Daily    lansoprazole  30 mg Per NG tube Daily    ceFAZolin  2,000 mg Intravenous See Admin Instructions    atorvastatin  10 mg Oral Daily    brimonidine  1 drop Left Eye BID    latanoprost  1 drop Left Eye Nightly    timolol  1 drop Left Eye BID    sodium chloride flush  10 mL Intravenous 2 times per day    enoxaparin  30 mg Subcutaneous Daily    insulin lispro  0-12 Units Subcutaneous Q4H    influenza virus vaccine  0.5 mL Intramuscular Prior to discharge      PN-Adult  3 IN 1 Central Line (Standard) 37.5 mL/hr at 03/03/21 1758    dextrose       sodium chloride flush, heparin flush, oxyCODONE **OR** oxyCODONE, HYDROmorphone **OR** HYDROmorphone, hydrALAZINE, morphine, phenol, trimethobenzamide, ipratropium-albuterol, nitroGLYCERIN, sodium chloride flush, polyethylene glycol, acetaminophen **OR** acetaminophen, glucose, dextrose, glucagon (rDNA), dextrose    IMPRESSION/RECOMMENDATIONS:      1. Acute kidney injury  sec to decreased effective renal perfusion with a Nguyễn<20 and FeNa <1%superimposed on CKD 3-A with Type 2 diabetes minimal proteinuria noted,   nonoliguric,   creatinine improving and  close to baseline  PLAN:1.Continue to follow    2. Volume overload due to increased third spacing  PLAN:1. Start low dose diuretic    3. Hypokalemia Resolved  PLAN:1. Continue to monitor    4. Hypocalcemia in the setting of Hypoalbuminemia  PLAN:1.Continue to monitor Ca++    5. Hypertension with CKD I-IV  BP above goal <130/80  PLAN:1. Continue to hold the minoxidil   2. Start low dose diuretic  3. Follow on the increased hydralazine of 50mg tid    4. small bowel obstruction s/p  surgery with post operative ileus  PLAN:1. Defer to Gen Surgery    5.  Ranulfo Jones MD

## 2021-03-04 NOTE — PROGRESS NOTES
A  Stand to sit:  Min A  Stand pivot:  Min A Min assist   Ambulation    3 feet sidestepping with ww with mod assist of 2 25 feet with w/w Min A 25 feet with ww with min assist   Stair Negotiation  Ascended and descended  NT       LE strength     3+/5     4-/5   balance      Fair at ww       AM-PAC Raw score               11/24 16/24          Vitals:  SPO2 before ambulation 92% and after ambulation 94% on room air. Patient education  Pt educated on PT objectives during treatment session, hand placement during transfers, w/w usage and safety. Patient response to education:   Pt verbalized understanding Pt demonstrated skill Pt requires further education in this area   Yes  With cueing yes     ASSESSMENT:    Comments:  Pt found in bed and left sitting up in the chair with call light in reach and chair alarm on. Treatment:  Patient practiced and was instructed in the following treatment:    Functional mobility performed as documented above. No report of dizziness during functional mobility. Cueing required for hand placement during transfers. Pt ambulates with slow gait speed. Cueing required for w/w usage and safety. Pt reported feeling SOB during treatment session. PLAN:    Patient is making good progress towards established goals. Will continue with current POC.      Time in  1013  Time out  1026    Total Treatment Time  13 minutes     CPT codes:    [x] Therapeutic activities 49124 13minutes  [] Therapeutic exercises 97628  minutes      Amador Weiner, [address]

## 2021-03-04 NOTE — PROGRESS NOTES
Per Dr. Cristopher Pacheco TPN is not being reordered. breath sounds equal/airway patent airway patent/no rales/no rhonchi/breath sounds equal

## 2021-03-04 NOTE — DISCHARGE SUMMARY
[facility] Physician Discharge Summary       Sheba Palafox MD  [address] [place] [address] [address]  [phone]    In 2 weeks  For wound re-check, staple removal    [address]  [phone]          Activity level: As tolerated     Dispo: LTAC    Condition on discharge: Stable     Patient ID:  Eden Zaman  [id number]  [de-identified] y.o.  1940    Admit date: 2/20/2021    Discharge date and time:  3/4/2021  5:27 PM    Admission Diagnoses: Active Problems:    Preoperative cardiovascular examination    SBO (small bowel obstruction) (HCC)    Cerebrovascular accident (CVA) ([address]    Hypernatremia    Uncontrolled type 2 diabetes mellitus with hyperglycemia ([address]    Thrombocytopenia (HCC)  Resolved Problems:    * No resolved hospital problems. *      Discharge Diagnoses: Active Problems:    Preoperative cardiovascular examination    SBO (small bowel obstruction) (HCC)    Cerebrovascular accident (CVA) ([address]    Hypernatremia    Uncontrolled type 2 diabetes mellitus with hyperglycemia ([address]    Thrombocytopenia (HCC)  Resolved Problems:    * No resolved hospital problems. *      Consults:  IP CONSULT TO GENERAL SURGERY  IP CONSULT TO NEPHROLOGY  IP CONSULT TO OPHTHALMOLOGY  IP CONSULT TO CARDIOLOGY  IP CONSULT TO NEUROLOGY  IP CONSULT TO DIETITIAN  IP CONSULT TO IV TEAM  IP CONSULT TO IV TEAM  IP CONSULT TO [address] [place] [address] Course:   Patient Eden Zaman is a [de-identified] y.o. presented with abd pain, nausea and vomiting, found to have Intestinal obstruction ([address] [K56.609]    1. SBO, NGT, was NPO, treated with IVF, appreciate surgery input, CT abd showed no changes with high grade SBO,  Ex-lap on 2/26/2021, on TPN, tolerated liquid diet, started on low fiber diet and switched to half TPN, TPN can be stopped  2.   ARF presented with cr at 3.5, up from 1.4 2 months ago, most likley prerenal due to SBO, start IVF with no improvement, cr is up to 4.3, nephrology was consulted, appreciate input, cr is down to 1.3.   3. Hx of CAD s/p CABG, no chest pain, continue on coreg, stating and imdur, appreciate cardiology input needed preop evaluation status post stress test that showed fixed defect EF 64%  4. DM II, hold oral hypoglycemic agents and started on SSI, switch IVF to d5NS, stop IVF, patient is eating now  5. HTN, BP is not controlled, continue on clonidine patch, coreg and imdur, IV hydralazine prn. And IV labetalol, started on hydralaxin, I will increase the dose to 50 TID  6. Back pain, was on morpohine  7. Acute subacute tiny punctate ischemic stroke left middle cerebral artery appreciate neurology input, started patient on daily baby aspirin and continued on statin. Will start plavix  8. Left eye vision loss, with uncontrolled BP, MRI showed no occipital stroke, but frontal, I appreciate neuro and ophthalmology inputprobably vascular disease on top of glaucoma. .    9. Bilateral carotid stenosis, R 50-69%, left 70-99% no intervention by vascular, only dual antiplatelet therapy    Discharge Exam:    General Appearance: alert and oriented to person, place and time and in no acute distress  Skin: warm and dry  Head: normocephalic and atraumatic  Eyes: pupils equal, round, and reactive to light, extraocular eye movements intact, conjunctivae normal  Neck: neck supple and non tender without mass   Pulmonary/Chest: clear to auscultation bilaterally- no wheezes, rales or rhonchi, normal air movement, no respiratory distress  Cardiovascular: normal rate, normal S1 and S2 and no carotid bruits  Abdomen: soft, non-tender, non-distended, normal bowel sounds, no masses or organomegaly  Extremities: no cyanosis, no clubbing and no edema  Neurologic:blind right eye, barely counting fingers left eye, speech normal    I/O last 3 completed shifts:  In: -   Out: 450 [Urine:450]  No intake/output data recorded.       LABS:  Recent Labs 03/02/21 0543 03/03/21 0215 03/04/21  0415    141 142   K 3.8 3.9 4.0   * 109* 110*   CO2 29 28 27   BUN 25* 21 23   CREATININE 1.4* 1.3* 1.3*   GLUCOSE 284* 247* 202*   CALCIUM 8.6 8.4* 8.6       Recent Labs     03/02/21  0543 03/03/21 0215 03/04/21  0415   WBC 9.1 8.9 8.9   RBC 3.44* 3.27* 3.31*   HGB 11.0* 10.5* 10.4*   HCT 34.2* 32.0* 31.6*   MCV 99.4 97.9 95.5   MCH 32.0 32.1 31.4   MCHC 32.2 32.8 32.9   RDW 13.5 13.2 13.2   PLT 86* 81* 91*   MPV 11.9 12.1* 12.1*       No results for input(s): POCGLU in the last 72 hours. Imaging:  Ct Abdomen Pelvis Wo Contrast Additional Contrast? Oral    Result Date: 2/21/2021  EXAMINATION: CT OF THE ABDOMEN AND PELVIS WITHOUT CONTRAST 2/21/2021 4:48 pm TECHNIQUE: CT of the abdomen and pelvis was performed without the administration of intravenous contrast. Multiplanar reformatted images are provided for review. Dose modulation, iterative reconstruction, and/or weight based adjustment of the mA/kV was utilized to reduce the radiation dose to as low as reasonably achievable. COMPARISON: 02/20/2021. HISTORY: ORDERING SYSTEM PROVIDED HISTORY: SBO TECHNOLOGIST PROVIDED HISTORY: CT Abdomen Pelvis with Oral Contrast Reason for exam:->SBO Additional Contrast?->Oral FINDINGS: The lung bases demonstrate minimal atelectasis. Liver, gallbladder, spleen, pancreas, the adrenals and the kidneys are normal.  Small hiatal hernia is present. There is severe calcification aorta and its major branches. There is significantly dilated fluid-filled proximal small bowel loops measuring up to 4.2 cm. The distal small bowel loops and colon are collapsed. There is narrowing of the small bowel loops in the right lower quadrant with a twisted appearance. Pelvis. The bladder is normal.  The prostate gland is prominent measuring 5.4 x 4 cm. The colon is collapsed.   The appendix is normal.    Persistent high-grade small-bowel obstruction with significantly dilated proximal small bowel loops with the transition point in the right lower quadrant with the swirling appearance in the mesentery. Mesenteric or midgrade volvulus or adhesions/stricture is considered. Decompression is recommended. Ct Abdomen Pelvis Wo Contrast Additional Contrast? None    Result Date: 2/20/2021  EXAMINATION: CT OF THE ABDOMEN AND PELVIS WITHOUT CONTRAST 2/20/2021 6:32 pm TECHNIQUE: CT of the abdomen and pelvis was performed without the administration of intravenous contrast. Multiplanar reformatted images are provided for review. Dose modulation, iterative reconstruction, and/or weight based adjustment of the mA/kV was utilized to reduce the radiation dose to as low as reasonably achievable. COMPARISON: No priors for comparison HISTORY: ORDERING SYSTEM PROVIDED HISTORY: Nausea vomiting abdominal distention concern for small bowel obstruction TECHNOLOGIST PROVIDED HISTORY: Reason for exam:->Nausea vomiting abdominal distention concern for small bowel obstruction Additional Contrast?->None FINDINGS: Lower Chest: Images through the lung bases show no evidence of airspace consolidation or pleural effusions. Organs: The liver, spleen, adrenals, pancreas and gallbladder are within normal limits. No evidence of urolithiasis or obstructive uropathy. Renal vascular calcifications noted bilaterally. GI/Bowel: Dilatation of multiple segments of small bowel throughout the abdomen and pelvis with air-fluid levels, consistent with small bowel obstruction. 2 areas of transition are seen in the right mid abdomen 1 of which is adjacent to anastomotic surgical changes. A 2nd transition point is seen on images number 93 through 91 with rotatory configuration of small bowel and mesenteric vessels with narrowing of small bowel which suggest small bowel volvulus. Follow-up with oral contrast may be helpful in further evaluation . No free intraperitoneal air. No evidence of colitis or diverticulitis. No signs of appendicitis. Postsurgical changes overlie the mediastinum. Lung bases appear clear. There are prominent air-filled bowel loops throughout the abdomen. Enteric tube extends subdiaphragmatic with distal tip and side port below the GE junction. Satisfactory position of enteric tube. Patient Instructions:      Medication List      START taking these medications    aspirin 81 MG chewable tablet  Take 1 tablet by mouth daily  Start taking on: March 5, 2021  Replaces: aspirin 325 MG EC tablet     bisacodyl 10 MG suppository  Commonly known as: DULCOLAX  Place 1 suppository rectally daily  Start taking on: March 5, 2021     insulin lispro 100 UNIT/ML injection vial  Commonly known as: HUMALOG  Inject 0-12 Units into the skin every 4 hours     oxyCODONE 5 MG immediate release tablet  Commonly known as: ROXICODONE  Take 1 tablet by mouth every 4 hours as needed for Pain for up to 3 days. polyethylene glycol 17 g packet  Commonly known as: GLYCOLAX  Take 17 g by mouth daily as needed for Constipation        CHANGE how you take these medications    hydrALAZINE 50 MG tablet  Commonly known as: APRESOLINE  Take 1 tablet by mouth every 8 hours  What changed:   · medication strength  · how much to take  · when to take this  · additional instructions        CONTINUE taking these medications    brimonidine 0.2 % ophthalmic solution  Commonly known as: ALPHAGAN     carvedilol 25 MG tablet  Commonly known as: COREG     cloNIDine 0.2 MG/24HR  Commonly known as: CATAPRES     docusate sodium 100 MG capsule  Commonly known as: COLACE     ferrous sulfate 325 (65 Fe) MG tablet  Commonly known as: IRON 325  Take 1 tablet by mouth 2 times daily (with meals). ipratropium-albuterol 0.5-2.5 (3) MG/3ML Soln nebulizer solution  Commonly known as: DUONEB  Inhale 3 mLs into the lungs every 6 hours as needed for Shortness of Breath.      latanoprost 0.005 % ophthalmic solution  Commonly known as: XALATAN     minoxidil 10 MG tablet  Commonly known as:  LONITEN     nitroGLYCERIN 0.4 MG SL tablet  Commonly known as: NITROSTAT     NONFORMULARY     OXYGEN     simethicone 80 MG chewable tablet  Commonly known as: MYLICON     simvastatin 10 MG tablet  Commonly known as: ZOCOR     tamsulosin 0.4 MG capsule  Commonly known as: FLOMAX     timolol 0.5 % ophthalmic solution  Commonly known as: TIMOPTIC     travoprost (benzalkonium) 0.004 % ophthalmic solution  Commonly known as: TRAVATAN     TRIPLE OMEGA-3-6-9 PO        STOP taking these medications    aspirin 325 MG EC tablet  Replaced by: aspirin 81 MG chewable tablet     insulin  UNIT/ML injection vial  Commonly known as: NovoLIN N     isosorbide mononitrate 30 MG extended release tablet  Commonly known as: IMDUR     sertraline 25 MG tablet  Commonly known as: ZOLOFT           Where to Get Your Medications      You can get these medications from any pharmacy    Bring a paper prescription for each of these medications  · oxyCODONE 5 MG immediate release tablet     Information about where to get these medications is not yet available    Ask your nurse or doctor about these medications  · aspirin 81 MG chewable tablet  · bisacodyl 10 MG suppository  · hydrALAZINE 50 MG tablet  · insulin lispro 100 UNIT/ML injection vial  · polyethylene glycol 17 g packet           Note that more than 30 minutes was spent in preparing discharge papers, discussing discharge with patient, medication review, etc.    Signed:  Electronically signed by Erick Meza MD on 3/4/2021 at 5:27 PM

## 2021-03-04 NOTE — PROGRESS NOTES
GENERAL SURGERY  DAILY PROGRESS NOTE  3/4/2021    Subjective:  Doing well, tolerated low fiber diet without issue. Objective:  BP (!) 140/64   Pulse 73   Temp 98.2 °F (36.8 °C) (Oral)   Resp 18   Ht 5' 11\" (1.803 m)   Wt 207 lb 3.2 oz (94 kg)   SpO2 92%   BMI 28.90 kg/m²     GENERAL:  Laying in bed, awake, alert, cooperative, no apparent distress  HEAD: Normocephalic, atraumatic  LUNGS:  No increased work of breathing  CARDIOVASCULAR:  RR  ABDOMEN:  Softly distended, non-tender. Incisions C/D/I.   EXTREMITIES: No edema or swelling    Assessment/Plan:  [de-identified] y.o. 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 - Low fiber diet  - Will not reorder TPN  - Adequate UOP, EMELY, appreciate nephrology recommendations  - Pain/nausea control prn  - OK to discharge from surgery POV    Electronically signed by Chente Vera MD on 3/4/2021 at 12:43 PM     The patient was seen and examined and the chart was reviewed. I agree with the assessment and plan. The patient will be discharged to an extended care facility. I will see the patient follow-up for removal of his staples and routine postoperative evaluation.

## 2021-04-13 ENCOUNTER — TELEPHONE (OUTPATIENT)
Dept: VASCULAR SURGERY | Age: 81
End: 2021-04-13

## 2021-09-14 ENCOUNTER — TELEPHONE (OUTPATIENT)
Dept: ADMINISTRATIVE | Age: 81
End: 2021-09-14

## 2021-09-14 NOTE — TELEPHONE ENCOUNTER
Patient Appointment Form:      PCP: Dr. Yasmine Cheng  Referring: Dr. Bruce Hutton    Has the Patient:    Seen a Cardiologist? yes    date:3/2021  Physician:Dr. Brenda Lyons  location:In hospital    Had a heart catheterization? no    Had heart surgery? no    Had a stress test or nuclear stress test? yes   date: 2/20/21   facility name:  In [facility]    Had an echocardiogram? yes   date: 3/4/21   [facility]:  [facility]    Had a vascular ultrasound? yes   date: 3/4/21   facility name:  In [address] [place] [address] Rd    Had a 24/48 heart monitor or extended cardiac event monitor? no    Had recent blood work in the last 6 months? yes    date: 3/19/21    ordering physician: In [address] [place] [address] Rd    Had a pacemaker/ICD/ILR implant? no    Seen an Electrophysiologist? no        Will send records via: in [address] [place] [address] Rd      Date & time of appointment:  November 11, 2021 at 9:45 a.m.

## 2021-11-11 ENCOUNTER — OFFICE VISIT (OUTPATIENT)
Dept: CARDIOLOGY CLINIC | Age: 81
End: 2021-11-11
Payer: OTHER GOVERNMENT

## 2021-11-11 VITALS
RESPIRATION RATE: 16 BRPM | DIASTOLIC BLOOD PRESSURE: 78 MMHG | HEART RATE: 61 BPM | BODY MASS INDEX: 29.26 KG/M2 | HEIGHT: 70 IN | WEIGHT: 204.4 LBS | SYSTOLIC BLOOD PRESSURE: 142 MMHG

## 2021-11-11 DIAGNOSIS — I77.9 CAROTID ARTERY DISEASE, UNSPECIFIED LATERALITY, UNSPECIFIED TYPE (HCC): ICD-10-CM

## 2021-11-11 DIAGNOSIS — E78.5 HYPERLIPIDEMIA, UNSPECIFIED HYPERLIPIDEMIA TYPE: ICD-10-CM

## 2021-11-11 DIAGNOSIS — I25.10 CORONARY ARTERY DISEASE, UNSPECIFIED VESSEL OR LESION TYPE, UNSPECIFIED WHETHER ANGINA PRESENT, UNSPECIFIED WHETHER NATIVE OR TRANSPLANTED HEART: Primary | ICD-10-CM

## 2021-11-11 DIAGNOSIS — I50.9 CONGESTIVE HEART FAILURE, UNSPECIFIED HF CHRONICITY, UNSPECIFIED HEART FAILURE TYPE (HCC): ICD-10-CM

## 2021-11-11 PROCEDURE — 93000 ELECTROCARDIOGRAM COMPLETE: CPT | Performed by: INTERNAL MEDICINE

## 2021-11-11 PROCEDURE — 99204 OFFICE O/P NEW MOD 45 MIN: CPT | Performed by: INTERNAL MEDICINE

## 2021-11-11 RX ORDER — OMEPRAZOLE 20 MG/1
20 CAPSULE, DELAYED RELEASE ORAL DAILY
COMMUNITY

## 2021-11-11 RX ORDER — POTASSIUM CHLORIDE 750 MG/1
10 TABLET, FILM COATED, EXTENDED RELEASE ORAL DAILY
COMMUNITY

## 2021-11-11 RX ORDER — CLOPIDOGREL BISULFATE 75 MG/1
75 TABLET ORAL DAILY
COMMUNITY

## 2021-11-11 RX ORDER — FUROSEMIDE 20 MG/1
20 TABLET ORAL 2 TIMES DAILY
COMMUNITY

## 2021-11-11 RX ORDER — HYDRALAZINE HYDROCHLORIDE 25 MG/1
25 TABLET, FILM COATED ORAL 3 TIMES DAILY
COMMUNITY

## 2021-11-11 RX ORDER — NIFEDIPINE 30 MG/1
30 TABLET, FILM COATED, EXTENDED RELEASE ORAL DAILY
COMMUNITY

## 2021-11-11 NOTE — PROGRESS NOTES
CHIEF COMPLAINT: CAD-CABG/CHF/Carotid disease    HISTORY OF PRESENT ILLNESS: Patient is a [de-identified] y.o. male seen at the request of Mercedes Yee PA-C. Presents to establish. Some COSTA. No CP or angina. Hx of CAD-CABG. Diagnosed with carotid disease.     Past Medical History:   Diagnosis Date    Blind right eye     CAD (coronary artery disease)     1994 CABG x 4    CHF (congestive heart failure) (HCC)     Chronic respiratory failure ([address] 9/22/2014    CKD (chronic kidney disease) stage 3, GFR 30-59 ml/min (HCC)     Diabetes mellitus ([address]     Diabetes mellitus type 2, controlled ([address] 5/23/2013    HLD (hyperlipidemia)     Hypertension     TIA (transient ischemic attack)        Patient Active Problem List   Diagnosis    COPD exacerbation ([address]    Chest pain    Hypertension    Coronary artery disease    Diabetes mellitus type 2, controlled ([address]    Hyperlipidemia    BPH (benign prostatic hyperplasia)    Hyperkalemia    Blind right eye    CKD (chronic kidney disease) stage 3, GFR 30-59 ml/min (HCC)    CAD (coronary artery disease)    CHF (congestive heart failure) (HCC)    Chronic respiratory failure (HCC)    EMELY (acute kidney injury) ([address]    SBO (small bowel obstruction) ([address]    Cerebrovascular accident (CVA) ([address]    Hypernatremia    Uncontrolled type 2 diabetes mellitus with hyperglycemia (HCC)    Thrombocytopenia (HCC)       Allergies   Allergen Reactions    Aldactone [Spironolactone] Other (See Comments)     Hyperkalemia--4/16/15       Current Outpatient Medications   Medication Sig Dispense Refill    clopidogrel (PLAVIX) 75 MG tablet Take 75 mg by mouth daily      omeprazole (PRILOSEC) 20 MG delayed release capsule Take 20 mg by mouth daily      hydrALAZINE (APRESOLINE) 25 MG tablet Take 25 mg by mouth 3 times daily      furosemide (LASIX) 20 MG tablet Take 20 mg by mouth 2 times daily      NIFEdipine (ADALAT CC) 30 MG extended release tablet Take 30 mg by mouth daily  potassium chloride (KLOR-CON) 10 MEQ extended release tablet Take 10 mEq by mouth daily      aspirin 81 MG chewable tablet Take 1 tablet by mouth daily 30 tablet 3    insulin lispro (HUMALOG) 100 UNIT/ML injection vial Inject 0-12 Units into the skin every 4 hours 1 vial 3    hydrALAZINE (APRESOLINE) 50 MG tablet Take 1 tablet by mouth every 8 hours (Patient taking differently: Take 25 mg by mouth every 8 hours ) 90 tablet 3    NONFORMULARY Omega XL, contains  Green lipped mussel oil extract, EPA, DHA, ETA, and HORTENCIA      carvedilol (COREG) 25 MG tablet Take 25 mg by mouth 2 times daily (with meals).  cloNIDine (CATAPRES) 0.2 MG/24HR Place 2 patches onto the skin once a week. Changed on Mondays      Omega 3-6-9 Fatty Acids (TRIPLE OMEGA-3-6-9 PO) Take 1 tablet by mouth 2 times daily.  latanoprost (XALATAN) 0.005 % ophthalmic solution Place 1 drop into the left eye nightly.  simethicone (MYLICON) 80 MG chewable tablet Take 80 mg by mouth every 6 hours as needed for Flatulence.  OXYGEN Inhale 4 L/min into the lungs.  brimonidine (ALPHAGAN) 0.2 % ophthalmic solution Place 1 drop into the left eye 2 times daily.  ferrous sulfate 325 (65 FE) MG tablet Take 1 tablet by mouth 2 times daily (with meals). 30 tablet 1    ipratropium-albuterol (DUONEB) 0.5-2.5 (3) MG/3ML SOLN nebulizer solution Inhale 3 mLs into the lungs every 6 hours as needed for Shortness of Breath. 360 mL 1    simvastatin (ZOCOR) 10 MG tablet Take 10 mg by mouth daily.  tamsulosin (FLOMAX) 0.4 MG capsule Take 0.4 mg by mouth every evening.  nitroGLYCERIN (NITROSTAT) 0.4 MG SL tablet Place 0.4 mg under the tongue every 5 minutes as needed.  docusate sodium (COLACE) 100 MG capsule Take 100 mg by mouth daily as needed. Indications: Constipation       No current facility-administered medications for this visit.        Social History     Socioeconomic History    Marital status: Single     Spouse name: Not on file    Number of children: Not on file    Years of education: Not on file    Highest education level: Not on file   Occupational History    Not on file   Tobacco Use    Smoking status: Former Smoker     Years: 2.00    Smokeless tobacco: Former User     Quit date: 8/12/1984   Substance and Sexual Activity    Alcohol use: No    Drug use: No    Sexual activity: Not on file   Other Topics Concern    Not on file   Social History Narrative    Not on file     Social Determinants of Health     Financial Resource Strain:     Difficulty of Paying Living Expenses: Not on file   Food Insecurity:     Worried About [address] in the Last Year: Not on file    [address] in the Last Year: Not on file   Transportation Needs:     Lack of Transportation (Medical): Not on file    Lack of Transportation (Non-Medical): Not on file   Physical Activity:     Days of Exercise per Week: Not on file    Minutes of Exercise per Session: Not on file   Stress:     Feeling of Stress : Not on file   Social Connections:     Frequency of Communication with Friends and Family: Not on file    Frequency of Social Gatherings with Friends and Family: Not on file    Attends Sabianist Services: Not on file    Active Member of [address] [place] [address] [employer] or Organizations: Not on file    Attends Club or Organization Meetings: Not on file    Marital Status: Not on file   Intimate Partner Violence:     Fear of Current or Ex-Partner: Not on file    Emotionally Abused: Not on file    Physically Abused: Not on file    Sexually Abused: Not on file   Housing Stability:     Unable to Pay for Housing in the Last Year: Not on file    Number of [place] in the Last Year: Not on file    Unstable Housing in the Last Year: Not on file       History reviewed. No pertinent family history. Review of Systems:  Heart: as above   Lungs: as above   Eyes: denies changes in vision or discharge. Ears: denies changes in hearing or pain.    Nose: denies epistaxis or masses   Throat: denies sore throat or trouble swallowing. Neuro: denies numbness, tingling, tremors. Skin: denies rashes or itching. : denies hematuria, dysuria   GI: denies vomiting, diarrhea   Psych: denies mood changed, anxiety, depression. All other systems negative. Physical Exam   BP (!) 142/78   Pulse 61   Resp 16   Ht 5' 10\" (1.778 m)   Wt 204 lb 6.4 oz (92.7 kg)   BMI 29.33 kg/m²   Constitutional: Oriented to person, place, and time. Well-developed and well-nourished. No distress. Head: Normocephalic and atraumatic. Eyes: EOM are normal. Pupils are equal, round, and reactive to light. Neck: Normal range of motion. Neck supple. No hepatojugular reflux and no JVD present. Carotid bruit is not present. No tracheal deviation present. No thyromegaly present. Cardiovascular: Normal rate, regular rhythm, normal heart sounds and intact distal pulses. Exam reveals no gallop and no friction rub. No murmur heard. Pulmonary/Chest: Effort normal and breath sounds normal. No respiratory distress. No wheezes. No rales. No tenderness. Abdominal: Soft. Bowel sounds are normal. No distension and no mass. No tenderness. No rebound and no guarding. Musculoskeletal: Normal range of motion. No edema and no tenderness. Lymphadenopathy:   No cervical adenopathy. No groin adenopathy. Neurological: Alert and oriented to person, place, and time. Skin: Skin is warm and dry. No rash noted. Not diaphoretic. No erythema. Psychiatric: Normal mood and affect. Behavior is normal.     EKG personally reviewed 11/11/21:  normal sinus rhythm, nonspecific ST and T waves changes, RBBB. Echo Summary 3/11/2021:   No evidence of tricuspid regurgitation. Left ventricle grossly normal in size. Marked proximal septal thickening. Normal LV segmental wall motion. Estimated left ventricular ejection fraction is 65±5%. Septal hypokinesis - borderline.    There is doppler evidence of stage I diastolic dysfunction. The LAESV Index is >34 ml/m2. Mildly dilated right ventricle. TAPSE is normal   Moderate mitral annular calcification. Physiologic and/or trace mitral regurgitation is present. No evidence of tricuspid regurgitation. Unable to accurately assess RV systolic pressure. Physiologic and/or trace pulmonic regurgitation present. Technically good quality study. No comparison study available. Suggest clinical correlation and bubble study to confirm presence of PFO. Stress Summary 2/25/2021:  The myocardial perfusion imaging demonstrates a fixed inferolateral   defect with corresponding wall motion abnormality consistent with a   prior myocardial infarction. No reversible ischemia is seen. The calculated ejection fraction 64%. However, visually the left   ventricle appears to be severely dilated with moderate global   hypokinesis and severe hypokinesis of the inferolateral wall. Consider   an echocardiogram if clinically indicated. ASSESSMENT AND PLAN:  Patient Active Problem List   Diagnosis    COPD exacerbation ([address]    Chest pain    Hypertension    Coronary artery disease    Diabetes mellitus type 2, controlled ([address]    Hyperlipidemia    BPH (benign prostatic hyperplasia)    Hyperkalemia    Blind right eye    CKD (chronic kidney disease) stage 3, GFR 30-59 ml/min ([facility])    CAD (coronary artery disease)    CHF (congestive heart failure) (HCC)    Chronic respiratory failure ([facility])    EMELY (acute kidney injury) ([address]    SBO (small bowel obstruction) ([address]    Cerebrovascular accident (CVA) ([address]    Hypernatremia    Uncontrolled type 2 diabetes mellitus with hyperglycemia ([facility])    Thrombocytopenia ([address]     1. CAD-CABG:    Stable stress 2/2021. Stable symptoms. ASA/plavix/BB/statin. 2. Chronic diastolic CHF: Monitor volume. 3. HTN: Observe. 4. Lipids: Statin. 5. DM: Per PCP. 6. CKD: Follow labs.     7. CVA: ASA/plavix/statin. 8. Carotid disease: Refer to vascular    9. COPD    10. Legally blind    Danny Jeffries D.O.   Cardiologist  Cardiology, [facility]

## 2021-11-12 ENCOUNTER — TELEPHONE (OUTPATIENT)
Dept: VASCULAR SURGERY | Age: 81
End: 2021-11-12

## 2021-12-06 ENCOUNTER — TELEPHONE (OUTPATIENT)
Dept: VASCULAR SURGERY | Age: 81
End: 2021-12-06

## 2021-12-06 NOTE — TELEPHONE ENCOUNTER
Received referral from Dr. Huma Mccarty for Dr. Lynne Anderson regarding carotid artery disease, left message for patient to return call.

## 2021-12-09 ENCOUNTER — TELEPHONE (OUTPATIENT)
Dept: VASCULAR SURGERY | Age: 81
End: 2021-12-09

## 2021-12-09 NOTE — TELEPHONE ENCOUNTER
Pt returned our calls regarding the referral we received for NICK; per his request, appt was scheduled with Dr. Kehinde Sprague on 1/4/22.

## 2022-01-03 ENCOUNTER — TELEPHONE (OUTPATIENT)
Dept: VASCULAR SURGERY | Age: 82
End: 2022-01-03

## 2022-01-24 ENCOUNTER — TELEPHONE (OUTPATIENT)
Dept: VASCULAR SURGERY | Age: 82
End: 2022-01-24

## 2022-01-25 ENCOUNTER — TELEPHONE (OUTPATIENT)
Dept: VASCULAR SURGERY | Age: 82
End: 2022-01-25

## 2022-01-25 NOTE — TELEPHONE ENCOUNTER
Pt cancelled today's visit with Dr. Stanley Rahman via the answering service; attempted to return pt's call to reschedule x 2 however, no answer/voicemail full.

## 2022-02-15 ENCOUNTER — OFFICE VISIT (OUTPATIENT)
Dept: VASCULAR SURGERY | Age: 82
End: 2022-02-15
Payer: OTHER GOVERNMENT

## 2022-02-15 DIAGNOSIS — I65.23 BILATERAL CAROTID ARTERY STENOSIS: Primary | ICD-10-CM

## 2022-02-15 PROCEDURE — 99213 OFFICE O/P EST LOW 20 MIN: CPT | Performed by: SURGERY

## 2022-02-15 NOTE — PROGRESS NOTES
Vascular Surgery Outpatient Progress Note      Chief Complaint   Patient presents with    Circulatory Problem     Follow up carotid stenosis, feeling short winded. HISTORY OF PRESENT ILLNESS:                The patient is a [de-identified] y.o. male who returns for follow-up evaluation of carotid artery stenosis. He states that his vision loss is progressing at a gradual rate. He denies any acute changes. He denies any symptoms of stroke, mini stroke, or amaurosis fugax. Past Medical History:        Diagnosis Date    Blind right eye     CAD (coronary artery disease)     1994 CABG x 4    [facility]est[facility] failure) ([facility])     Chronic respiratory failure ([facility] 9/22/2014    CKD (chronic kidney disease) stage 3, GFR 30-59 ml/min ([facility])     Diabetes mellitus ([facility]     Diabetes mellitus type 2, controlled ([facility] 5/23/2013    HLD (hyperlipidemia)     Hypertension     TIA (transient ischemic attack)      Past Surgical History:        Procedure Laterality Date    CARDIAC SURGERY      CORONARY ARTERY BYPASS GRAFT  1994    x4    ECHO COMPL W DOP COLOR FLOW  5/23/2013         ECHO COMPL W DOP COLOR FLOW  5/23/2013         EYE SURGERY      HC INSERT PICC CATH, 5/> YRS  2/27/2021         LAPAROSCOPY N/A 2/26/2021    exploratory laparotomy lysis of adhesions performed by Kunal Fletcher MD at [facility] OR     Current Medications:   Prior to Admission medications    Medication Sig Start Date End Date Taking?  Authorizing Provider   clopidogrel (PLAVIX) 75 MG tablet Take 75 mg by mouth daily   Yes Historical Provider, MD   omeprazole (PRILOSEC) 20 MG delayed release capsule Take 20 mg by mouth daily   Yes Historical Provider, MD   hydrALAZINE (APRESOLINE) 25 MG tablet Take 25 mg by mouth 3 times daily   Yes Historical Provider, MD   furosemide (LASIX) 20 MG tablet Take 20 mg by mouth 2 times daily   Yes Historical Provider, MD   NIFEdipine (ADALAT CC) 30 MG extended release tablet Take 30 mg by mouth daily   Yes Historical Provider, MD   potassium chloride (KLOR-CON) 10 MEQ extended release tablet Take 10 mEq by mouth daily   Yes Historical Provider, MD   aspirin 81 MG chewable tablet Take 1 tablet by mouth daily 3/5/21  Yes Liliam Muñoz MD   insulin lispro (HUMALOG) 100 UNIT/ML injection vial Inject 0-12 Units into the skin every 4 hours 3/4/21  Yes Liliam Muñoz MD   NONFORMULARY Omega XL, contains  Green lipped mussel oil extract, EPA, DHA, ETA, and HORTENCIA   Yes Historical Provider, MD   carvedilol (COREG) 25 MG tablet Take 25 mg by mouth 2 times daily (with meals). Yes Historical Provider, MD   cloNIDine (CATAPRES) 0.2 MG/24HR Place 2 patches onto the skin once a week. Changed on Mondays   Yes Historical Provider, MD   Omega 3-6-9 Fatty Acids (TRIPLE OMEGA-3-6-9 PO) Take 1 tablet by mouth 2 times daily. Yes Historical Provider, MD   latanoprost (XALATAN) 0.005 % ophthalmic solution Place 1 drop into the left eye nightly. Yes Historical Provider, MD   simethicone (MYLICON) 80 MG chewable tablet Take 80 mg by mouth every 6 hours as needed for Flatulence. Yes Historical Provider, MD   OXYGEN Inhale 4 L/min into the lungs. Yes Historical Provider, MD   brimonidine (ALPHAGAN) 0.2 % ophthalmic solution Place 1 drop into the left eye 2 times daily. Yes Historical Provider, MD   ferrous sulfate 325 (65 FE) MG tablet Take 1 tablet by mouth 2 times daily (with meals). 5/28/13  Yes Jass Rodriguez MD   ipratropium-albuterol (DUONEB) 0.5-2.5 (3) MG/3ML SOLN nebulizer solution Inhale 3 mLs into the lungs every 6 hours as needed for Shortness of Breath. 5/28/13  Yes Jass Rodriguez MD   simvastatin (ZOCOR) 10 MG tablet Take 10 mg by mouth daily. Yes Historical Provider, MD   tamsulosin (FLOMAX) 0.4 MG capsule Take 0.4 mg by mouth every evening. Yes Historical Provider, MD   nitroGLYCERIN (NITROSTAT) 0.4 MG SL tablet Place 0.4 mg under the tongue every 5 minutes as needed.      Yes Historical Provider, MD   docusate sodium (COLACE) 100 MG capsule Take 100 mg by mouth daily as needed. Indications: Constipation   Yes Historical Provider, MD     Allergies:  Aldactone [spironolactone]    Social History     Socioeconomic History    Marital status: Single     Spouse name: Not on file    Number of children: Not on file    Years of education: Not on file    Highest education level: Not on file   Occupational History    Not on file   Tobacco Use    Smoking status: Former Smoker     Packs/day: 1.50     Types: Cigarettes     Quit date: 2/15/1982     Years since quittin.0    Smokeless tobacco: Former User     Quit date: 1984   Vaping Use    Vaping Use: Never used   Substance and Sexual Activity    Alcohol use: No    Drug use: No    Sexual activity: Not on file   Other Topics Concern    Not on file   Social History Narrative    Not on file     Social Determinants of Health     Financial Resource Strain:     Difficulty of Paying Living Expenses: Not on file   Food Insecurity:     Worried About [address] [employer] in the Last Year: Not on file    [address] in the Last Year: Not on file   Transportation Needs:     Lack of Transportation (Medical): Not on file    Lack of Transportation (Non-Medical):  Not on file   Physical Activity:     Days of Exercise per Week: Not on file    Minutes of Exercise per Session: Not on file   Stress:     Feeling of Stress : Not on file   Social Connections:     Frequency of Communication with Friends and Family: Not on file    Frequency of Social Gatherings with Friends and Family: Not on file    Attends Faith Services: Not on file    Active Member of Clubs or Organizations: Not on file    Attends Club or Organization Meetings: Not on file    Marital Status: Not on file   Intimate Partner Violence:     Fear of Current or Ex-Partner: Not on file    Emotionally Abused: Not on file    Physically Abused: Not on file    Sexually Abused: Not on file   Housing Stability:     Unable to Pay for Housing in the Last Year: Not on file    Number of Places Lived in the Last Year: Not on file    Unstable Housing in the Last Year: Not on file        History reviewed. No pertinent family history.     REVIEW OF SYSTEMS (New symptoms):    Eyes:      Blurred vision:  No []/Yes [x]               Diplopia:   No [x]/Yes []               Vision loss:       No []/Yes [x]   Ears, nose, throat:             Hearing loss:    No [x]/Yes []      Vertigo:   No [x]/Yes []                       Swallowing problem:  No [x]/Yes []               Nose bleeds:   No [x]/Yes []      Voice hoarseness:  No [x]/Yes []  Respiratory:             Cough:   No [x]/Yes []      Pleuritic chest pain:  No [x]/Yes []                        Dyspnea:   No [x]/Yes []      Wheezing:   No [x]/Yes []  Cardiovascular:             Angina:   No [x]/Yes []      Palpitations:   No [x]/Yes []          Claudication:    No [x]/Yes []      Leg swelling:   No [x]/Yes []  Gastrointestinal:             Nausea or vomiting:  No [x]/Yes []               Abdominal pain:  No [x]/Yes []                     Intestinal bleeding: No [x]/Yes []  Musculoskeletal:             Leg pain:   No [x]/Yes []      Back pain:   No [x]/Yes []                    Weakness:   No [x]/Yes []  Neurologic:             Numbness:   No [x]/Yes []      Paralysis:   No [x]/Yes []                       Headaches:   No [x]/Yes []  Hematologic, lymphatic:   Anemia:   No [x]/Yes []              Bleeding or bruising:  No [x]/Yes []              Fevers or chills: No [x]/Yes []  Endocrine:             Temp intolerance:   No [x]/Yes []                       Polydipsia, polyuria:  No [x]/Yes []  Skin:              Rash:    No [x]/Yes []      Ulcers:   No [x]/Yes []              Abnorm pigment: No [x]/Yes []  :              Frequency/urgency:  No [x]/Yes []      Hematuria:    No [x]/Yes []                      Incontinence:    No [x]/Yes []    PHYSICAL EXAM:  There were no vitals filed for this visit. General Appearance: alert and oriented to person, place and time, in no acute distress, well developed and well- nourished  Neurologic: no cranial nerve deficit, speech normal  Head: normocephalic and atraumatic  Eyes: extraocular eye movements intact, R conjunctivae abnormal  ENT: external ear and ear canal normal bilaterally, nose without deformity, no carotid bruits  Pulmonary/Chest: normal air movement, no respiratory distress  Cardiovascular: normal rate, regular rhythm, no murmur  Abdomen: non-distended, no masses  Musculoskeletal: no joint deformity or tenderness  Extremities: no leg edema bilaterally  Skin: warm and dry, no rash or erythema    PULSE EXAM      Right      Left   Brachial     Radial     Femoral     Popliteal     Dorsalis Pedis     Posterior Tibial     (3=normal, 2=diminished, 1=barely palpable, 4=widened)    RADIOLOGY: SA today    Problem List Items Addressed This Visit     None      Visit Diagnoses     Bilateral carotid artery stenosis    -  Primary    Relevant Orders    US CAROTID ARTERY BILATERAL        I reviewed with the patient that we will obtain a new carotid ultrasound to compare to previous studies. We will notify him of the results.

## 2022-02-16 ENCOUNTER — TELEPHONE (OUTPATIENT)
Dept: VASCULAR SURGERY | Age: 82
End: 2022-02-16

## 2022-02-16 NOTE — TELEPHONE ENCOUNTER
Spoke with Anupama Perez at [place] who then referred me to Tonja Crook. Requested authorization for 2-15-22 appt with Dr. Tita Reese and ultrasound. Faxed records to [phone].

## 2022-02-16 NOTE — TELEPHONE ENCOUNTER
Spoke with Elizabeth at [facility]. The authorization for Dr. Wilber Dow is the same as the auth for Dr. Eddie Holland. [id number] 9-3-21 to 5-10-22.

## 2022-04-12 ENCOUNTER — TELEPHONE (OUTPATIENT)
Dept: VASCULAR SURGERY | Age: 82
End: 2022-04-12

## 2022-04-12 NOTE — TELEPHONE ENCOUNTER
Pt failed to show for a carotid u/s at [facility] 3/4, rescheduled for 5/5 at 4:00 pm, message left on pt's answering machine.

## 2022-05-05 ENCOUNTER — HOSPITAL ENCOUNTER (OUTPATIENT)
Dept: ULTRASOUND IMAGING | Age: 82
Discharge: HOME | End: 2022-05-07
Payer: OTHER GOVERNMENT

## 2022-05-05 DIAGNOSIS — I65.23 BILATERAL CAROTID ARTERY STENOSIS: ICD-10-CM

## 2022-05-05 PROCEDURE — 93880 EXTRACRANIAL BILAT STUDY: CPT

## 2022-05-12 ENCOUNTER — TELEPHONE (OUTPATIENT)
Dept: ADMINISTRATIVE | Age: 82
End: 2022-05-12

## 2022-05-12 NOTE — TELEPHONE ENCOUNTER
[facility] 63 calling to schedule pts 6 mth OV with Dr Ronn Florez - he was due in May. Please call pt with appropriate apt when July schedule is available. Thank you.

## 2022-06-29 ENCOUNTER — OFFICE VISIT (OUTPATIENT)
Dept: CARDIOLOGY CLINIC | Age: 82
End: 2022-06-29
Payer: OTHER GOVERNMENT

## 2022-06-29 VITALS
SYSTOLIC BLOOD PRESSURE: 134 MMHG | DIASTOLIC BLOOD PRESSURE: 60 MMHG | HEIGHT: 70 IN | WEIGHT: 203.6 LBS | BODY MASS INDEX: 29.15 KG/M2 | HEART RATE: 60 BPM

## 2022-06-29 DIAGNOSIS — I25.10 CORONARY ARTERY DISEASE INVOLVING NATIVE HEART WITHOUT ANGINA PECTORIS, UNSPECIFIED VESSEL OR LESION TYPE: Primary | ICD-10-CM

## 2022-06-29 PROCEDURE — 1123F ACP DISCUSS/DSCN MKR DOCD: CPT | Performed by: INTERNAL MEDICINE

## 2022-06-29 PROCEDURE — 93000 ELECTROCARDIOGRAM COMPLETE: CPT | Performed by: INTERNAL MEDICINE

## 2022-06-29 PROCEDURE — 99214 OFFICE O/P EST MOD 30 MIN: CPT | Performed by: INTERNAL MEDICINE

## 2022-06-29 RX ORDER — SERTRALINE HYDROCHLORIDE 25 MG/1
75 TABLET, FILM COATED ORAL
COMMUNITY
Start: 2021-09-15

## 2022-06-29 RX ORDER — ATORVASTATIN CALCIUM 80 MG/1
80 TABLET, FILM COATED ORAL
COMMUNITY
Start: 2022-01-19 | End: 2022-06-29

## 2022-06-29 RX ORDER — INSULIN GLARGINE 100 [IU]/ML
INJECTION, SOLUTION SUBCUTANEOUS 2 TIMES DAILY
COMMUNITY
End: 2022-06-29

## 2022-06-29 NOTE — PROGRESS NOTES
CHIEF COMPLAINT: CAD-CABG/CHF/Carotid disease    HISTORY OF PRESENT ILLNESS: Patient is a 81 y.o. male seen at the request of Manisha Leos PA-C. Presents in follow up. Some COSTA. No CP or angina. Hx of CAD-CABG.      Past Medical History:   Diagnosis Date    Blind right eye     CAD (coronary artery disease)     1994 CABG x 4    CHF (congestive heart failure) (HCC)     Chronic respiratory failure ([address] 9/22/2014    CKD (chronic kidney disease) stage 3, GFR 30-59 ml/min (HCC)     Diabetes mellitus ([address]     Diabetes mellitus type 2, controlled ([address] 5/23/2013    HLD (hyperlipidemia)     Hypertension     TIA (transient ischemic attack)        Patient Active Problem List   Diagnosis    COPD exacerbation ([address]    Chest pain    Hypertension    Coronary artery disease    Diabetes mellitus type 2, controlled ([address]    Hyperlipidemia    BPH (benign prostatic hyperplasia)    Hyperkalemia    Blind right eye    CKD (chronic kidney disease) stage 3, GFR 30-59 ml/min (HCC)    CAD (coronary artery disease)    CHF (congestive heart failure) (HCC)    Chronic respiratory failure (HCC)    EMELY (acute kidney injury) ([address]    SBO (small bowel obstruction) ([address]    Cerebrovascular accident (CVA) ([address]    Hypernatremia    Uncontrolled type 2 diabetes mellitus with hyperglycemia (HCC)    Thrombocytopenia (HCC)       Allergies   Allergen Reactions    Aldactone [Spironolactone] Other (See Comments)     Hyperkalemia--4/16/15       Current Outpatient Medications   Medication Sig Dispense Refill    clopidogrel (PLAVIX) 75 MG tablet Take 75 mg by mouth daily      omeprazole (PRILOSEC) 20 MG delayed release capsule Take 20 mg by mouth daily      hydrALAZINE (APRESOLINE) 25 MG tablet Take 25 mg by mouth 3 times daily      furosemide (LASIX) 20 MG tablet Take 20 mg by mouth 2 times daily      NIFEdipine (ADALAT CC) 30 MG extended release tablet Take 30 mg by mouth daily      potassium chloride (KLOR-CON) 10 MEQ extended release tablet Take 10 mEq by mouth daily      aspirin 81 MG chewable tablet Take 1 tablet by mouth daily 30 tablet 3    insulin lispro (HUMALOG) 100 UNIT/ML injection vial Inject 0-12 Units into the skin every 4 hours 1 vial 3    NONFORMULARY Omega XL, contains  Green lipped mussel oil extract, EPA, DHA, ETA, and HORTENCIA      carvedilol (COREG) 25 MG tablet Take 25 mg by mouth 2 times daily (with meals).  cloNIDine (CATAPRES) 0.2 MG/24HR Place 2 patches onto the skin once a week. Changed on Mondays      Omega 3-6-9 Fatty Acids (TRIPLE OMEGA-3-6-9 PO) Take 1 tablet by mouth 2 times daily.  latanoprost (XALATAN) 0.005 % ophthalmic solution Place 1 drop into the left eye nightly.  simethicone (MYLICON) 80 MG chewable tablet Take 80 mg by mouth every 6 hours as needed for Flatulence.  OXYGEN Inhale 4 L/min into the lungs.  brimonidine (ALPHAGAN) 0.2 % ophthalmic solution Place 1 drop into the left eye 2 times daily.  ferrous sulfate 325 (65 FE) MG tablet Take 1 tablet by mouth 2 times daily (with meals). 30 tablet 1    ipratropium-albuterol (DUONEB) 0.5-2.5 (3) MG/3ML SOLN nebulizer solution Inhale 3 mLs into the lungs every 6 hours as needed for Shortness of Breath. 360 mL 1    simvastatin (ZOCOR) 10 MG tablet Take 10 mg by mouth daily.  tamsulosin (FLOMAX) 0.4 MG capsule Take 0.4 mg by mouth every evening.  nitroGLYCERIN (NITROSTAT) 0.4 MG SL tablet Place 0.4 mg under the tongue every 5 minutes as needed.  docusate sodium (COLACE) 100 MG capsule Take 100 mg by mouth daily as needed. Indications: Constipation       No current facility-administered medications for this visit.        Social History     Socioeconomic History    Marital status: Single     Spouse name: Not on file    Number of children: Not on file    Years of education: Not on file    Highest education level: Not on file   Occupational History    Not on file   Tobacco Use    Smoking status: Former Smoker     Packs/day: 1.50     Types: Cigarettes     Quit date: 2/15/1982     Years since quittin.3    Smokeless tobacco: Former User     Quit date: 1984   Vaping Use    Vaping Use: Never used   Substance and Sexual Activity    Alcohol use: No    Drug use: No    Sexual activity: Not on file   Other Topics Concern    Not on file   Social History Narrative    Not on file     Social Determinants of Health     Financial Resource Strain:     Difficulty of Paying Living Expenses: Not on file   Food Insecurity:     Worried About Running Out of Food in the Last Year: Not on file    Irma of Food in the Last Year: Not on file   Transportation Needs:     Lack of Transportation (Medical): Not on file    Lack of Transportation (Non-Medical): Not on file   Physical Activity:     Days of Exercise per Week: Not on file    Minutes of Exercise per Session: Not on file   Stress:     Feeling of Stress : Not on file   Social Connections:     Frequency of Communication with Friends and Family: Not on file    Frequency of Social Gatherings with Friends and Family: Not on file    Attends [employer] Services: Not on file    Active Member of [address] [place] [address] [employer] or Organizations: Not on file    Attends Club or Organization Meetings: Not on file    Marital Status: Not on file   Intimate Partner Violence:     Fear of Current or Ex-Partner: Not on file    Emotionally Abused: Not on file    Physically Abused: Not on file    Sexually Abused: Not on file   Housing Stability:     Unable to Pay for Housing in the Last Year: Not on file    Number of [place] in the Last Year: Not on file    Unstable Housing in the Last Year: Not on file       No family history on file. Review of Systems:  Heart: as above   Lungs: as above   Eyes: denies changes in vision or discharge. Ears: denies changes in hearing or pain. Nose: denies epistaxis or masses   Throat: denies sore throat or trouble swallowing.    Neuro: denies numbness, tingling, tremors. Skin: denies rashes or itching. : denies hematuria, dysuria   GI: denies vomiting, diarrhea   Psych: denies mood changed, anxiety, depression. All other systems negative. Physical Exam   There were no vitals taken for this visit. Constitutional: Oriented to person, place, and time. Well-developed and well-nourished. No distress. Head: Normocephalic and atraumatic. Eyes: EOM are normal. Pupils are equal, round, and reactive to light. Neck: Normal range of motion. Neck supple. No hepatojugular reflux and no JVD present. Carotid bruit is not present. No tracheal deviation present. No thyromegaly present. Cardiovascular: Normal rate, regular rhythm, normal heart sounds and intact distal pulses. Exam reveals no gallop and no friction rub. No murmur heard. Pulmonary/Chest: Effort normal and breath sounds normal. No respiratory distress. No wheezes. No rales. No tenderness. Abdominal: Soft. Bowel sounds are normal. No distension and no mass. No tenderness. No rebound and no guarding. Musculoskeletal: Normal range of motion. No edema and no tenderness. Lymphadenopathy:   No cervical adenopathy. No groin adenopathy. Neurological: Alert and oriented to person, place, and time. Skin: Skin is warm and dry. No rash noted. Not diaphoretic. No erythema. Psychiatric: Normal mood and affect. Behavior is normal.     EKG personally reviewed 06/29/22:  normal sinus rhythm, nonspecific ST and T waves changes, RBBB. Echo Summary 3/11/2021:   No evidence of tricuspid regurgitation. Left ventricle grossly normal in size. Marked proximal septal thickening. Normal LV segmental wall motion. Estimated left ventricular ejection fraction is 65±5%. Septal hypokinesis - borderline. There is doppler evidence of stage I diastolic dysfunction. The LAESV Index is >34 ml/m2. Mildly dilated right ventricle.    TAPSE is normal   Moderate mitral annular calcification. Physiologic and/or trace mitral regurgitation is present. No evidence of tricuspid regurgitation. Unable to accurately assess RV systolic pressure. Physiologic and/or trace pulmonic regurgitation present. Technically good quality study. No comparison study available. Suggest clinical correlation and bubble study to confirm presence of PFO. Stress Summary 2/25/2021:  The myocardial perfusion imaging demonstrates a fixed inferolateral   defect with corresponding wall motion abnormality consistent with a   prior myocardial infarction. No reversible ischemia is seen. The calculated ejection fraction 64%. However, visually the left   ventricle appears to be severely dilated with moderate global   hypokinesis and severe hypokinesis of the inferolateral wall. Consider   an echocardiogram if clinically indicated. ASSESSMENT AND PLAN:  Patient Active Problem List   Diagnosis    COPD exacerbation ([address]    Chest pain    Hypertension    Coronary artery disease    Diabetes mellitus type 2, controlled ([address]    Hyperlipidemia    BPH (benign prostatic hyperplasia)    Hyperkalemia    Blind right eye    CKD (chronic kidney disease) stage 3, GFR 30-59 ml/min ([facility])    CAD (coronary artery disease)    CHF (congestive heart failure) ([facility])    Chronic respiratory failure ([facility])    EMELY (acute kidney injury) ([address]    SBO (small bowel obstruction) ([address]    Cerebrovascular accident (CVA) ([address]    Hypernatremia    Uncontrolled type 2 diabetes mellitus with hyperglycemia ([facility])    Thrombocytopenia ([address]     1. CAD-CABG:    Stable stress 2/2021. Stable symptoms. ASA/plavix/BB/statin. 2. Chronic diastolic CHF: Monitor volume. 3. HTN: Observe. 4. Lipids: Statin. 5. DM: Per PCP. 6. CKD: Follow labs. 7. CVA: ASA/plavix/statin. 8. Carotid disease: Follows with vascular    9. COPD    10. Legally blind    Joseluis Jones D.O.   Cardiologist  Cardiology, [facility]) Physicians

## 2022-08-02 ENCOUNTER — TELEPHONE (OUTPATIENT)
Dept: VASCULAR SURGERY | Age: 82
End: 2022-08-02

## 2022-08-11 NOTE — TELEPHONE ENCOUNTER
Attempted to telephone pt regarding test results:   (R) side = 50-69%, (L) side = 60-79%. Reimage 5/2023  No answer, mailbox full.

## 2022-08-12 DIAGNOSIS — I65.23 BILATERAL CAROTID ARTERY STENOSIS: Primary | ICD-10-CM

## 2022-08-12 NOTE — TELEPHONE ENCOUNTER
Notified the pt of carotid u/s results, scheduled carotid u/s + ov with Dr. Miguelangel Escalante 5/9/23.

## 2022-08-29 ENCOUNTER — HOSPITAL ENCOUNTER (OUTPATIENT)
Age: 82
Discharge: HOME | End: 2022-08-29

## 2022-08-29 LAB
ALBUMIN SERPL-MCNC: 3.8 G/DL (ref 3.5–5.2)
ALP BLD-CCNC: 85 U/L (ref 40–129)
ALT SERPL-CCNC: 26 U/L (ref 0–40)
ANION GAP SERPL CALCULATED.3IONS-SCNC: 10 MMOL/L (ref 7–16)
AST SERPL-CCNC: 22 U/L (ref 0–39)
BACTERIA: ABNORMAL /HPF
BASOPHILS ABSOLUTE: 0.02 E9/L (ref 0–0.2)
BASOPHILS RELATIVE PERCENT: 0.3 % (ref 0–2)
BILIRUB SERPL-MCNC: 0.5 MG/DL (ref 0–1.2)
BILIRUBIN URINE: NEGATIVE
BLOOD, URINE: NEGATIVE
BUN BLDV-MCNC: 22 MG/DL (ref 6–23)
CALCIUM SERPL-MCNC: 9.2 MG/DL (ref 8.6–10.2)
CHLORIDE BLD-SCNC: 101 MMOL/L (ref 98–107)
CLARITY: CLEAR
CO2: 28 MMOL/L (ref 22–29)
COLOR: YELLOW
CREAT SERPL-MCNC: 1.2 MG/DL (ref 0.7–1.2)
CREATININE URINE: 123 MG/DL (ref 40–278)
EOSINOPHILS ABSOLUTE: 0.19 E9/L (ref 0.05–0.5)
EOSINOPHILS RELATIVE PERCENT: 2.5 % (ref 0–6)
GFR AFRICAN AMERICAN: >60
GFR NON-AFRICAN AMERICAN: >60 ML/MIN/1.73
GLUCOSE BLD-MCNC: 153 MG/DL (ref 74–99)
GLUCOSE URINE: NEGATIVE MG/DL
HBA1C MFR BLD: 7.2 % (ref 4–5.6)
HCT VFR BLD CALC: 36.8 % (ref 37–54)
HEMOGLOBIN: 12.7 G/DL (ref 12.5–16.5)
HYALINE CASTS: ABNORMAL /LPF (ref 0–2)
IMMATURE GRANULOCYTES #: 0.02 E9/L
IMMATURE GRANULOCYTES %: 0.3 % (ref 0–5)
KETONES, URINE: NEGATIVE MG/DL
LEUKOCYTE ESTERASE, URINE: NEGATIVE
LYMPHOCYTES ABSOLUTE: 1.92 E9/L (ref 1.5–4)
LYMPHOCYTES RELATIVE PERCENT: 25.4 % (ref 20–42)
MAGNESIUM: 2 MG/DL (ref 1.6–2.6)
MCH RBC QN AUTO: 32.3 PG (ref 26–35)
MCHC RBC AUTO-ENTMCNC: 34.5 % (ref 32–34.5)
MCV RBC AUTO: 93.6 FL (ref 80–99.9)
MONOCYTES ABSOLUTE: 0.88 E9/L (ref 0.1–0.95)
MONOCYTES RELATIVE PERCENT: 11.6 % (ref 2–12)
NEUTROPHILS ABSOLUTE: 4.54 E9/L (ref 1.8–7.3)
NEUTROPHILS RELATIVE PERCENT: 59.9 % (ref 43–80)
NITRITE, URINE: NEGATIVE
PARATHYROID HORMONE INTACT: 55 PG/ML (ref 15–65)
PDW BLD-RTO: 13.9 FL (ref 11.5–15)
PH UA: 6 (ref 5–9)
PHOSPHORUS: 3.2 MG/DL (ref 2.5–4.5)
PLATELET # BLD: 155 E9/L (ref 130–450)
PMV BLD AUTO: 9.8 FL (ref 7–12)
POTASSIUM SERPL-SCNC: 3.7 MMOL/L (ref 3.5–5)
PROTEIN PROTEIN: 50 MG/DL (ref 0–12)
PROTEIN UA: 30 MG/DL
PROTEIN/CREAT RATIO: 0.4
PROTEIN/CREAT RATIO: 0.4 (ref 0–0.2)
RBC # BLD: 3.93 E12/L (ref 3.8–5.8)
RBC UA: ABNORMAL /HPF (ref 0–2)
SODIUM BLD-SCNC: 139 MMOL/L (ref 132–146)
SPECIFIC GRAVITY UA: 1.02 (ref 1–1.03)
TOTAL PROTEIN: 6.6 G/DL (ref 6.4–8.3)
URIC ACID, SERUM: 5.6 MG/DL (ref 3.4–7)
UROBILINOGEN, URINE: 0.2 E.U./DL
VITAMIN D 25-HYDROXY: 54 NG/ML (ref 30–100)
WBC # BLD: 7.6 E9/L (ref 4.5–11.5)
WBC UA: ABNORMAL /HPF (ref 0–5)

## 2022-08-29 PROCEDURE — 80053 COMPREHEN METABOLIC PANEL: CPT

## 2022-08-29 PROCEDURE — 84100 ASSAY OF PHOSPHORUS: CPT

## 2022-08-29 PROCEDURE — 82306 VITAMIN D 25 HYDROXY: CPT

## 2022-08-29 PROCEDURE — 83735 ASSAY OF MAGNESIUM: CPT

## 2022-08-29 PROCEDURE — 84550 ASSAY OF BLOOD/URIC ACID: CPT

## 2022-08-29 PROCEDURE — 81001 URINALYSIS AUTO W/SCOPE: CPT

## 2022-08-29 PROCEDURE — 84156 ASSAY OF PROTEIN URINE: CPT

## 2022-08-29 PROCEDURE — 83036 HEMOGLOBIN GLYCOSYLATED A1C: CPT

## 2022-08-29 PROCEDURE — 36415 COLL VENOUS BLD VENIPUNCTURE: CPT

## 2022-08-29 PROCEDURE — 85025 COMPLETE CBC W/AUTO DIFF WBC: CPT

## 2022-08-29 PROCEDURE — 83970 ASSAY OF PARATHORMONE: CPT

## 2022-08-29 PROCEDURE — 82570 ASSAY OF URINE CREATININE: CPT

## 2022-12-11 ENCOUNTER — APPOINTMENT (OUTPATIENT)
Dept: GENERAL RADIOLOGY | Age: 82
End: 2022-12-11
Payer: OTHER GOVERNMENT

## 2022-12-11 ENCOUNTER — APPOINTMENT (OUTPATIENT)
Dept: CT IMAGING | Age: 82
End: 2022-12-11
Payer: OTHER GOVERNMENT

## 2022-12-11 ENCOUNTER — HOSPITAL ENCOUNTER (EMERGENCY)
Age: 82
Discharge: HOME | End: 2022-12-11
Attending: STUDENT IN AN ORGANIZED HEALTH CARE EDUCATION/TRAINING PROGRAM
Payer: OTHER GOVERNMENT

## 2022-12-11 VITALS
OXYGEN SATURATION: 94 % | BODY MASS INDEX: 28.63 KG/M2 | SYSTOLIC BLOOD PRESSURE: 184 MMHG | DIASTOLIC BLOOD PRESSURE: 86 MMHG | WEIGHT: 200 LBS | HEIGHT: 70 IN | HEART RATE: 61 BPM | TEMPERATURE: 97.6 F | RESPIRATION RATE: 16 BRPM

## 2022-12-11 DIAGNOSIS — R94.31 PROLONGED Q-T INTERVAL ON ECG: ICD-10-CM

## 2022-12-11 DIAGNOSIS — R03.0 ELEVATED BLOOD PRESSURE READING: Primary | ICD-10-CM

## 2022-12-11 LAB
ALBUMIN SERPL-MCNC: 3.4 G/DL (ref 3.5–5.2)
ALP BLD-CCNC: 74 U/L (ref 40–129)
ALT SERPL-CCNC: 39 U/L (ref 0–40)
ANION GAP SERPL CALCULATED.3IONS-SCNC: 8 MMOL/L (ref 7–16)
AST SERPL-CCNC: 29 U/L (ref 0–39)
BASOPHILS ABSOLUTE: 0.02 E9/L (ref 0–0.2)
BASOPHILS RELATIVE PERCENT: 0.2 % (ref 0–2)
BILIRUB SERPL-MCNC: 0.5 MG/DL (ref 0–1.2)
BUN BLDV-MCNC: 22 MG/DL (ref 6–23)
CALCIUM SERPL-MCNC: 9 MG/DL (ref 8.6–10.2)
CHLORIDE BLD-SCNC: 104 MMOL/L (ref 98–107)
CO2: 27 MMOL/L (ref 22–29)
CREAT SERPL-MCNC: 1.2 MG/DL (ref 0.7–1.2)
EOSINOPHILS ABSOLUTE: 0.04 E9/L (ref 0.05–0.5)
EOSINOPHILS RELATIVE PERCENT: 0.5 % (ref 0–6)
GFR SERPL CREATININE-BSD FRML MDRD: >60 ML/MIN/1.73
GLUCOSE BLD-MCNC: 169 MG/DL (ref 74–99)
HCT VFR BLD CALC: 37.4 % (ref 37–54)
HEMOGLOBIN: 12.5 G/DL (ref 12.5–16.5)
IMMATURE GRANULOCYTES #: 0.02 E9/L
IMMATURE GRANULOCYTES %: 0.2 % (ref 0–5)
LYMPHOCYTES ABSOLUTE: 1.51 E9/L (ref 1.5–4)
LYMPHOCYTES RELATIVE PERCENT: 18.8 % (ref 20–42)
MCH RBC QN AUTO: 31.3 PG (ref 26–35)
MCHC RBC AUTO-ENTMCNC: 33.4 % (ref 32–34.5)
MCV RBC AUTO: 93.7 FL (ref 80–99.9)
MONOCYTES ABSOLUTE: 0.84 E9/L (ref 0.1–0.95)
MONOCYTES RELATIVE PERCENT: 10.4 % (ref 2–12)
NEUTROPHILS ABSOLUTE: 5.62 E9/L (ref 1.8–7.3)
NEUTROPHILS RELATIVE PERCENT: 69.9 % (ref 43–80)
PDW BLD-RTO: 13.8 FL (ref 11.5–15)
PLATELET # BLD: 141 E9/L (ref 130–450)
PMV BLD AUTO: 10.2 FL (ref 7–12)
POTASSIUM REFLEX MAGNESIUM: 4 MMOL/L (ref 3.5–5)
PRO-BNP: 1203 PG/ML (ref 0–450)
RBC # BLD: 3.99 E12/L (ref 3.8–5.8)
SARS-COV-2, NAAT: NOT DETECTED
SODIUM BLD-SCNC: 139 MMOL/L (ref 132–146)
TOTAL PROTEIN: 6.1 G/DL (ref 6.4–8.3)
TROPONIN, HIGH SENSITIVITY: 43 NG/L (ref 0–11)
TROPONIN, HIGH SENSITIVITY: 46 NG/L (ref 0–11)
WBC # BLD: 8.1 E9/L (ref 4.5–11.5)

## 2022-12-11 PROCEDURE — 85025 COMPLETE CBC W/AUTO DIFF WBC: CPT

## 2022-12-11 PROCEDURE — 70450 CT HEAD/BRAIN W/O DYE: CPT

## 2022-12-11 PROCEDURE — 87635 SARS-COV-2 COVID-19 AMP PRB: CPT

## 2022-12-11 PROCEDURE — 80053 COMPREHEN METABOLIC PANEL: CPT

## 2022-12-11 PROCEDURE — 93005 ELECTROCARDIOGRAM TRACING: CPT | Performed by: STUDENT IN AN ORGANIZED HEALTH CARE EDUCATION/TRAINING PROGRAM

## 2022-12-11 PROCEDURE — 99285 EMERGENCY DEPT VISIT HI MDM: CPT

## 2022-12-11 PROCEDURE — 6370000000 HC RX 637 (ALT 250 FOR IP): Performed by: STUDENT IN AN ORGANIZED HEALTH CARE EDUCATION/TRAINING PROGRAM

## 2022-12-11 PROCEDURE — 84484 ASSAY OF TROPONIN QUANT: CPT

## 2022-12-11 PROCEDURE — 83880 ASSAY OF NATRIURETIC PEPTIDE: CPT

## 2022-12-11 PROCEDURE — 71046 X-RAY EXAM CHEST 2 VIEWS: CPT

## 2022-12-11 RX ORDER — LISINOPRIL 10 MG/1
40 TABLET ORAL ONCE
Status: COMPLETED | OUTPATIENT
Start: 2022-12-11 | End: 2022-12-11

## 2022-12-11 RX ORDER — HYDRALAZINE HYDROCHLORIDE 20 MG/ML
10 INJECTION INTRAMUSCULAR; INTRAVENOUS ONCE
Status: DISCONTINUED | OUTPATIENT
Start: 2022-12-11 | End: 2022-12-11

## 2022-12-11 RX ORDER — CARVEDILOL 25 MG/1
25 TABLET ORAL ONCE
Status: COMPLETED | OUTPATIENT
Start: 2022-12-11 | End: 2022-12-11

## 2022-12-11 RX ORDER — NIFEDIPINE 30 MG/1
60 TABLET, FILM COATED, EXTENDED RELEASE ORAL ONCE
Status: COMPLETED | OUTPATIENT
Start: 2022-12-11 | End: 2022-12-11

## 2022-12-11 RX ORDER — FUROSEMIDE 40 MG/1
60 TABLET ORAL ONCE
Status: COMPLETED | OUTPATIENT
Start: 2022-12-11 | End: 2022-12-11

## 2022-12-11 RX ADMIN — CARVEDILOL 25 MG: 25 TABLET, FILM COATED ORAL at 14:36

## 2022-12-11 RX ADMIN — NIFEDIPINE 60 MG: 30 TABLET, EXTENDED RELEASE ORAL at 14:36

## 2022-12-11 RX ADMIN — HYDRALAZINE HYDROCHLORIDE 75 MG: 50 TABLET, FILM COATED ORAL at 14:36

## 2022-12-11 RX ADMIN — FUROSEMIDE 60 MG: 40 TABLET ORAL at 14:37

## 2022-12-11 RX ADMIN — LISINOPRIL 40 MG: 10 TABLET ORAL at 14:37

## 2022-12-11 ASSESSMENT — PAIN - FUNCTIONAL ASSESSMENT
PAIN_FUNCTIONAL_ASSESSMENT: NONE - DENIES PAIN
PAIN_FUNCTIONAL_ASSESSMENT: NONE - DENIES PAIN

## 2022-12-11 NOTE — DISCHARGE INSTRUCTIONS
Follow-up with family doctor, cardiologist, and nephrologist.  Return for any worsening symptoms or other acute symptoms or concerns.

## 2022-12-11 NOTE — ED PROVIDER NOTES
Department of Emergency Medicine   ED  Provider Note  Admit Date/RoomTime: 12/11/2022 12:52 PM  ED Room: [place] [id number]          History of Present Illness:  12/11/22, Time: 1:03 PM EST  Chief Complaint   Patient presents with    Hypertension     States 'B systolic, on BP meds    Shortness of Breath        Efraín Wright is a 81 y.o. male presenting to the ED for  HTN, beginning today. The complaint has been persistent, mild in severity, and worsened by nothing. The patient is an 81-year-old male presents emergency department complaining of elevated blood pressure. He states he has a history of hypertension and goes to the [place] along with nephrology who helps to manage his blood pressure medications. His symptoms have been gradual onset, persistent, moderate severity, nothing makes it better or worse. He symptoms [place] came to visit him and took his blood pressure which was found to be elevated so he wanted him to come here to get checked out. At home the patient takes Coreg 25 mg twice daily along with a clonidine 0.2 mg patch and Lasix 60 mg twice daily, hydralazine 75 mg twice daily, lisinopril 40 mg daily, and nifedipine 60 mg.  I did confirm these medications with our clinical pharmacist.  The patient states that he has had some shortness of breath at times but currently is not feeling short of breath. He states that he is legally blind but does have some blurry vision intermittently. He denies any headache, numbness, tingling, unilateral weakness, fall, dizziness, lightheadedness, chest pain, palpitations, nausea, vomiting, diarrhea, recent changes medications, recent hospitalization, recent was, or other acute symptoms or concerns. He states that he usually takes his medications at 1:00 PM and 1 AM.  He states he took them last night but has not yet taken them today.     Review of Systems:   A complete review of systems was performed and pertinent positives and negatives are stated within HPI, all other systems reviewed and are negative.        --------------------------------------------- PAST HISTORY ---------------------------------------------  Past Medical History:  has a past medical history of Blind right eye, CAD (coronary artery disease), CHF (congestive heart failure) ([facility]), Chronic respiratory failure ([facility] CKD (chronic kidney disease) stage 3, GFR 30-59 ml/min ([facility] Diabetes mellitus ([facility] Diabetes mellitus type 2, controlled ([facility] HLD (hyperlipidemia), Hypertension, and TIA (transient ischemic attack). Past Surgical History:  has a past surgical history that includes Coronary artery bypass graft (1994); Eye surgery; ECHO Compl W Dop Color Flow (5/23/2013); ECHO Compl W Dop Color Flow (5/23/2013); Cardiac surgery; Insert Picc Cath, 5/> Yrs (2/27/2021); and laparoscopy (N/A, 2/26/2021). Social History:  reports that he quit smoking about 40 years ago. His smoking use included cigarettes. He smoked an average of 1.5 packs per day. He quit smokeless tobacco use about 38 years ago. He reports that he does not drink alcohol and does not use drugs. Family History: family history is not on file. . Unless otherwise noted, family history is non contributory    The patients home medications have been reviewed. Allergies: Dorzolamide, Seasonal, and Spironolactone    I have reviewed the past medical history, past surgical history, social history, and family history    ---------------------------------------------------PHYSICAL EXAM--------------------------------------    Constitutional/General: Alert and oriented x3  Head: Normocephalic and atraumatic  Eyes:  EOMI, sclera non icteric.   Opacification over the bilateral eyes which is chronic  ENT: Oropharynx clear, handling secretions, no trismus, no asymmetry of the posterior oropharynx or uvular edema  Neck: Supple, full ROM, no stridor, no meningeal signs  Respiratory: Lungs clear to auscultation bilaterally, no wheezes, 3.5 - 5.0 mmol/L    Chloride 104 98 - 107 mmol/L    CO2 27 22 - 29 mmol/L    Anion Gap 8 7 - 16 mmol/L    Glucose 169 (H) 74 - 99 mg/dL    BUN 22 6 - 23 mg/dL    Creatinine 1.2 0.7 - 1.2 mg/dL    Est, Glom Filt Rate >60 >=60 mL/min/1.73    Calcium 9.0 8.6 - 10.2 mg/dL    Total Protein 6.1 (L) 6.4 - 8.3 g/dL    Albumin 3.4 (L) 3.5 - 5.2 g/dL    Total Bilirubin 0.5 0.0 - 1.2 mg/dL    Alkaline Phosphatase 74 40 - 129 U/L    ALT 39 0 - 40 U/L    AST 29 0 - 39 U/L   Troponin   Result Value Ref Range    Troponin, High Sensitivity 46 (H) 0 - 11 ng/L   Brain Natriuretic Peptide   Result Value Ref Range    Pro-BNP 1,203 (H) 0 - 450 pg/mL   Troponin   Result Value Ref Range    Troponin, High Sensitivity 43 (H) 0 - 11 ng/L   EKG 12 Lead   Result Value Ref Range    Ventricular Rate 57 BPM    Atrial Rate 57 BPM    P-R Interval 246 ms    QRS Duration 180 ms    Q-T Interval 526 ms    QTc Calculation (Bazett) 511 ms    P Axis 49 degrees    R Axis -33 degrees    T Axis 39 degrees   ,       RADIOLOGY:      Radiologist interpretation  XR CHEST (2 VW)   Final Result   1. Cardiomegaly. There are no findings of failure or pneumonia. CT HEAD WO CONTRAST   Final Result   No acute intracranial abnormality. EKG Interpretation  Interpreted by emergency department physician, Dr. Juan Roman     EKG: This EKG is signed and interpreted by me.     Rate: 57  Rhythm: Sinus  Interpretation: Sinus bradycardia with first-degree AV block, left axis deviation, right bundle branch block, QT is prolonged, QTC is 511, no acute elevations  Comparison: no previous EKG available         ------------------------- NURSING NOTES AND VITALS REVIEWED ---------------------------   The nursing notes within the ED encounter and vital signs as below have been reviewed by myself  BP (!) 184/86   Pulse 61   Temp 97.6 °F (36.4 °C) (Oral)   Resp 16   Ht 5' 10\" (1.778 m)   Wt 200 lb (90.7 kg)   SpO2 94%   BMI 28.70 kg/m²     Oxygen Saturation Interpretation: Normal    The patients available past medical records and past encounters were reviewed. ------------------------------ ED COURSE/MEDICAL DECISION MAKING----------------------  Medications   carvedilol (COREG) tablet 25 mg (25 mg Oral Given 12/11/22 1436)   furosemide (LASIX) tablet 60 mg (60 mg Oral Given 12/11/22 1437)   hydrALAZINE (APRESOLINE) tablet 75 mg (75 mg Oral Given 12/11/22 1436)   lisinopril (PRINIVIL;ZESTRIL) tablet 40 mg (40 mg Oral Given 12/11/22 1437)   NIFEdipine (ADALAT CC) extended release tablet 60 mg (60 mg Oral Given 12/11/22 1436)            I, Dr. Rika Sepulveda, am the primary provider of record    Medical Decision Making:   The patient is an 30-year-old male presents emergency department complaining of elevated blood pressure and shortness of breath. He was found to be hypertensive on arrival with a blood pressure of 210/61. He is otherwise hemodynamically stable, nontoxic, no acute distress. He states that he does have shortness of breath at times but is currently not short of breath. He has had some blurring of his vision worse than usual.  He is legally blind but states he is able to make out shadows typically but I was little blurry to him initially. CT head did not show any evidence of acute intracranial abnormalities. Labs are reassuring. He is not hypoxic. Did provide him with his home blood pressure medications. Blood pressure improved to 184/86 on repeat. He states that the blurry vision had completely resolved. He is not having any symptoms upon reassessment was sleeping and resting comfortably. Recommended him to follow-up with the [place] regarding his blood pressure along with his nephrologist.  Strict return precautions were given and patient was discharged home in stable condition.        While not exhaustive, the following diagnoses were considered: Elevated blood pressure versus hypertensive urgency versus intracranial hemorrhage versus stroke        Oxygen Saturation Interpretation: 94 % on RA. Re-Evaluations:  ED Course as of 12/12/22 1531   Sun Dec 11, 2022   1549 Reevaluated patient. He is resting comfortably and sleeping in no distress. He states that his blurry vision had completely resolved. He was not having any symptoms upon reassessment. He states that he is not having any chest pain or shortness of breath upon questioning multiple times. His blood pressure improved after treatment with his home medications. He states that he had not yet taken them today. He was completely asymptomatic. [KG]   1552 EKG: This EKG is signed and interpreted by me. Rate: 57  Rhythm: Sinus  Interpretation: Sinus bradycardia with first-degree AV block, left axis deviation, right bundle branch block, prolonged QT, QTC is 511  Comparison: no previous EKG available    [KG]      ED Course User Index  [KG] Domingo Bowman DO             This patient's ED course included: a personal history and physicial examination, re-evaluation prior to disposition, multiple bedside re-evaluations, IV medications, cardiac monitoring, continuous pulse oximetry, and complex medical decision making and emergency management    This patient has remained hemodynamically stable during their ED course. Counseling: The emergency provider has spoken with the patient and discussed todays results, in addition to providing specific details for the plan of care and counseling regarding the diagnosis and prognosis. Questions are answered at this time and they are agreeable with the plan.       --------------------------------- IMPRESSION AND DISPOSITION ---------------------------------    IMPRESSION  1. Elevated blood pressure reading    2. Prolonged Q-T interval on ECG        DISPOSITION  Disposition: Discharge to home  Patient condition is stable        NOTE: This report was transcribed using voice recognition software.  Every effort was made to ensure accuracy; however, inadvertent computerized transcription errors may be present       Jenna WrightrDO  12/12/22 0612

## 2022-12-12 LAB
EKG ATRIAL RATE: 57 BPM
EKG P AXIS: 49 DEGREES
EKG P-R INTERVAL: 246 MS
EKG Q-T INTERVAL: 526 MS
EKG QRS DURATION: 180 MS
EKG QTC CALCULATION (BAZETT): 511 MS
EKG R AXIS: -33 DEGREES
EKG T AXIS: 39 DEGREES
EKG VENTRICULAR RATE: 57 BPM

## 2022-12-12 PROCEDURE — 93010 ELECTROCARDIOGRAM REPORT: CPT | Performed by: INTERNAL MEDICINE

## 2023-01-07 ENCOUNTER — HOSPITAL ENCOUNTER (INPATIENT)
Age: 83
LOS: 5 days | Discharge: HOME | End: 2023-01-12
Attending: EMERGENCY MEDICINE | Admitting: STUDENT IN AN ORGANIZED HEALTH CARE EDUCATION/TRAINING PROGRAM
Payer: OTHER GOVERNMENT

## 2023-01-07 ENCOUNTER — APPOINTMENT (OUTPATIENT)
Dept: GENERAL RADIOLOGY | Age: 83
End: 2023-01-07
Payer: OTHER GOVERNMENT

## 2023-01-07 DIAGNOSIS — I44.1 AV BLOCK, MOBITZ 1: ICD-10-CM

## 2023-01-07 DIAGNOSIS — Z74.09 IMPAIRED MOBILITY AND ADLS: ICD-10-CM

## 2023-01-07 DIAGNOSIS — I10 ESSENTIAL HYPERTENSION: Primary | ICD-10-CM

## 2023-01-07 DIAGNOSIS — H26.9 CATARACT OF RIGHT EYE, UNSPECIFIED CATARACT TYPE: ICD-10-CM

## 2023-01-07 DIAGNOSIS — Z78.9 IMPAIRED MOBILITY AND ADLS: ICD-10-CM

## 2023-01-07 PROBLEM — I16.0 HYPERTENSIVE URGENCY: Status: ACTIVE | Noted: 2023-01-07

## 2023-01-07 LAB
ANION GAP SERPL CALCULATED.3IONS-SCNC: 6 MMOL/L (ref 7–16)
BASOPHILS ABSOLUTE: 0.01 E9/L (ref 0–0.2)
BASOPHILS RELATIVE PERCENT: 0.2 % (ref 0–2)
BUN BLDV-MCNC: 27 MG/DL (ref 6–23)
CALCIUM SERPL-MCNC: 8.9 MG/DL (ref 8.6–10.2)
CHLORIDE BLD-SCNC: 103 MMOL/L (ref 98–107)
CO2: 29 MMOL/L (ref 22–29)
CREAT SERPL-MCNC: 1.3 MG/DL (ref 0.7–1.2)
EOSINOPHILS ABSOLUTE: 0.06 E9/L (ref 0.05–0.5)
EOSINOPHILS RELATIVE PERCENT: 1 % (ref 0–6)
GFR SERPL CREATININE-BSD FRML MDRD: 55 ML/MIN/1.73
GLUCOSE BLD-MCNC: 200 MG/DL (ref 74–99)
HCT VFR BLD CALC: 34.7 % (ref 37–54)
HEMOGLOBIN: 11.8 G/DL (ref 12.5–16.5)
IMMATURE GRANULOCYTES #: 0 E9/L
IMMATURE GRANULOCYTES %: 0 % (ref 0–5)
INR BLD: 1.2
LYMPHOCYTES ABSOLUTE: 1.57 E9/L (ref 1.5–4)
LYMPHOCYTES RELATIVE PERCENT: 26.7 % (ref 20–42)
MCH RBC QN AUTO: 32.4 PG (ref 26–35)
MCHC RBC AUTO-ENTMCNC: 34 % (ref 32–34.5)
MCV RBC AUTO: 95.3 FL (ref 80–99.9)
METER GLUCOSE: 192 MG/DL (ref 74–99)
MONOCYTES ABSOLUTE: 0.66 E9/L (ref 0.1–0.95)
MONOCYTES RELATIVE PERCENT: 11.2 % (ref 2–12)
NEUTROPHILS ABSOLUTE: 3.57 E9/L (ref 1.8–7.3)
NEUTROPHILS RELATIVE PERCENT: 60.9 % (ref 43–80)
PDW BLD-RTO: 13.3 FL (ref 11.5–15)
PLATELET # BLD: 141 E9/L (ref 130–450)
PMV BLD AUTO: 10.1 FL (ref 7–12)
POTASSIUM SERPL-SCNC: 3.9 MMOL/L (ref 3.5–5)
PROTHROMBIN TIME: 13.3 SEC (ref 9.3–12.4)
RBC # BLD: 3.64 E12/L (ref 3.8–5.8)
SODIUM BLD-SCNC: 138 MMOL/L (ref 132–146)
TROPONIN, HIGH SENSITIVITY: 45 NG/L (ref 0–11)
WBC # BLD: 5.9 E9/L (ref 4.5–11.5)

## 2023-01-07 PROCEDURE — 2580000003 HC RX 258: Performed by: STUDENT IN AN ORGANIZED HEALTH CARE EDUCATION/TRAINING PROGRAM

## 2023-01-07 PROCEDURE — 99223 1ST HOSP IP/OBS HIGH 75: CPT | Performed by: STUDENT IN AN ORGANIZED HEALTH CARE EDUCATION/TRAINING PROGRAM

## 2023-01-07 PROCEDURE — 71045 X-RAY EXAM CHEST 1 VIEW: CPT

## 2023-01-07 PROCEDURE — 99285 EMERGENCY DEPT VISIT HI MDM: CPT

## 2023-01-07 PROCEDURE — 1200000000 HC SEMI PRIVATE

## 2023-01-07 PROCEDURE — 85025 COMPLETE CBC W/AUTO DIFF WBC: CPT

## 2023-01-07 PROCEDURE — 82962 GLUCOSE BLOOD TEST: CPT

## 2023-01-07 PROCEDURE — 93005 ELECTROCARDIOGRAM TRACING: CPT | Performed by: EMERGENCY MEDICINE

## 2023-01-07 PROCEDURE — 6370000000 HC RX 637 (ALT 250 FOR IP): Performed by: STUDENT IN AN ORGANIZED HEALTH CARE EDUCATION/TRAINING PROGRAM

## 2023-01-07 PROCEDURE — 84484 ASSAY OF TROPONIN QUANT: CPT

## 2023-01-07 PROCEDURE — 80048 BASIC METABOLIC PNL TOTAL CA: CPT

## 2023-01-07 PROCEDURE — 85610 PROTHROMBIN TIME: CPT

## 2023-01-07 RX ORDER — FUROSEMIDE 20 MG/1
20 TABLET ORAL 2 TIMES DAILY
Status: DISCONTINUED | OUTPATIENT
Start: 2023-01-07 | End: 2023-01-12 | Stop reason: HOSPADM

## 2023-01-07 RX ORDER — ATORVASTATIN CALCIUM 10 MG/1
10 TABLET, FILM COATED ORAL DAILY
Status: DISCONTINUED | OUTPATIENT
Start: 2023-01-08 | End: 2023-01-12 | Stop reason: HOSPADM

## 2023-01-07 RX ORDER — ACETAMINOPHEN 325 MG/1
650 TABLET ORAL EVERY 6 HOURS PRN
Status: DISCONTINUED | OUTPATIENT
Start: 2023-01-07 | End: 2023-01-12 | Stop reason: HOSPADM

## 2023-01-07 RX ORDER — NIFEDIPINE 30 MG/1
30 TABLET, FILM COATED, EXTENDED RELEASE ORAL DAILY
Status: DISCONTINUED | OUTPATIENT
Start: 2023-01-08 | End: 2023-01-09

## 2023-01-07 RX ORDER — DOCUSATE SODIUM 100 MG/1
100 CAPSULE, LIQUID FILLED ORAL DAILY PRN
Status: DISCONTINUED | OUTPATIENT
Start: 2023-01-07 | End: 2023-01-12 | Stop reason: HOSPADM

## 2023-01-07 RX ORDER — IPRATROPIUM BROMIDE AND ALBUTEROL SULFATE 2.5; .5 MG/3ML; MG/3ML
3 SOLUTION RESPIRATORY (INHALATION) EVERY 6 HOURS PRN
Status: DISCONTINUED | OUTPATIENT
Start: 2023-01-07 | End: 2023-01-12 | Stop reason: HOSPADM

## 2023-01-07 RX ORDER — BRIMONIDINE TARTRATE 2 MG/ML
1 SOLUTION/ DROPS OPHTHALMIC 2 TIMES DAILY
Status: DISCONTINUED | OUTPATIENT
Start: 2023-01-08 | End: 2023-01-12 | Stop reason: HOSPADM

## 2023-01-07 RX ORDER — INSULIN LISPRO 100 [IU]/ML
0-8 INJECTION, SOLUTION INTRAVENOUS; SUBCUTANEOUS
Status: DISCONTINUED | OUTPATIENT
Start: 2023-01-08 | End: 2023-01-12 | Stop reason: HOSPADM

## 2023-01-07 RX ORDER — CLOPIDOGREL BISULFATE 75 MG/1
75 TABLET ORAL DAILY
Status: DISCONTINUED | OUTPATIENT
Start: 2023-01-08 | End: 2023-01-12 | Stop reason: HOSPADM

## 2023-01-07 RX ORDER — ONDANSETRON 2 MG/ML
4 INJECTION INTRAMUSCULAR; INTRAVENOUS EVERY 6 HOURS PRN
Status: DISCONTINUED | OUTPATIENT
Start: 2023-01-07 | End: 2023-01-12 | Stop reason: HOSPADM

## 2023-01-07 RX ORDER — ACETAMINOPHEN 650 MG/1
650 SUPPOSITORY RECTAL EVERY 6 HOURS PRN
Status: DISCONTINUED | OUTPATIENT
Start: 2023-01-07 | End: 2023-01-12 | Stop reason: HOSPADM

## 2023-01-07 RX ORDER — TAMSULOSIN HYDROCHLORIDE 0.4 MG/1
0.4 CAPSULE ORAL EVERY EVENING
Status: DISCONTINUED | OUTPATIENT
Start: 2023-01-07 | End: 2023-01-12 | Stop reason: HOSPADM

## 2023-01-07 RX ORDER — HYDRALAZINE HYDROCHLORIDE 20 MG/ML
20 INJECTION INTRAMUSCULAR; INTRAVENOUS EVERY 6 HOURS PRN
Status: DISCONTINUED | OUTPATIENT
Start: 2023-01-07 | End: 2023-01-12 | Stop reason: HOSPADM

## 2023-01-07 RX ORDER — CARVEDILOL 25 MG/1
25 TABLET ORAL 2 TIMES DAILY WITH MEALS
Status: DISCONTINUED | OUTPATIENT
Start: 2023-01-08 | End: 2023-01-09

## 2023-01-07 RX ORDER — POLYETHYLENE GLYCOL 3350 17 G/17G
17 POWDER, FOR SOLUTION ORAL DAILY PRN
Status: DISCONTINUED | OUTPATIENT
Start: 2023-01-07 | End: 2023-01-12 | Stop reason: HOSPADM

## 2023-01-07 RX ORDER — DEXTROSE MONOHYDRATE 100 MG/ML
INJECTION, SOLUTION INTRAVENOUS CONTINUOUS PRN
Status: DISCONTINUED | OUTPATIENT
Start: 2023-01-07 | End: 2023-01-12 | Stop reason: HOSPADM

## 2023-01-07 RX ORDER — INSULIN LISPRO 100 [IU]/ML
0-4 INJECTION, SOLUTION INTRAVENOUS; SUBCUTANEOUS NIGHTLY
Status: DISCONTINUED | OUTPATIENT
Start: 2023-01-07 | End: 2023-01-12 | Stop reason: HOSPADM

## 2023-01-07 RX ORDER — ENOXAPARIN SODIUM 100 MG/ML
40 INJECTION SUBCUTANEOUS DAILY
Status: DISCONTINUED | OUTPATIENT
Start: 2023-01-08 | End: 2023-01-12 | Stop reason: HOSPADM

## 2023-01-07 RX ORDER — SODIUM CHLORIDE 9 MG/ML
INJECTION, SOLUTION INTRAVENOUS PRN
Status: DISCONTINUED | OUTPATIENT
Start: 2023-01-07 | End: 2023-01-12 | Stop reason: HOSPADM

## 2023-01-07 RX ORDER — ASPIRIN 81 MG/1
81 TABLET, CHEWABLE ORAL DAILY
Status: DISCONTINUED | OUTPATIENT
Start: 2023-01-08 | End: 2023-01-12 | Stop reason: HOSPADM

## 2023-01-07 RX ORDER — NITROGLYCERIN 0.4 MG/1
0.4 TABLET SUBLINGUAL EVERY 5 MIN PRN
Status: DISCONTINUED | OUTPATIENT
Start: 2023-01-07 | End: 2023-01-12 | Stop reason: HOSPADM

## 2023-01-07 RX ORDER — CLONIDINE HYDROCHLORIDE 0.1 MG/1
0.05 TABLET ORAL 2 TIMES DAILY
Status: DISCONTINUED | OUTPATIENT
Start: 2023-01-07 | End: 2023-01-12 | Stop reason: HOSPADM

## 2023-01-07 RX ORDER — LATANOPROST 50 UG/ML
1 SOLUTION/ DROPS OPHTHALMIC NIGHTLY
Status: DISCONTINUED | OUTPATIENT
Start: 2023-01-07 | End: 2023-01-12 | Stop reason: HOSPADM

## 2023-01-07 RX ORDER — PANTOPRAZOLE SODIUM 40 MG/1
40 TABLET, DELAYED RELEASE ORAL
Status: DISCONTINUED | OUTPATIENT
Start: 2023-01-08 | End: 2023-01-12 | Stop reason: HOSPADM

## 2023-01-07 RX ORDER — HYDRALAZINE HYDROCHLORIDE 25 MG/1
25 TABLET, FILM COATED ORAL 3 TIMES DAILY
Status: DISCONTINUED | OUTPATIENT
Start: 2023-01-07 | End: 2023-01-09

## 2023-01-07 RX ORDER — ONDANSETRON 4 MG/1
4 TABLET, ORALLY DISINTEGRATING ORAL EVERY 8 HOURS PRN
Status: DISCONTINUED | OUTPATIENT
Start: 2023-01-07 | End: 2023-01-12 | Stop reason: HOSPADM

## 2023-01-07 RX ORDER — SODIUM CHLORIDE 0.9 % (FLUSH) 0.9 %
5-40 SYRINGE (ML) INJECTION EVERY 12 HOURS SCHEDULED
Status: DISCONTINUED | OUTPATIENT
Start: 2023-01-07 | End: 2023-01-12 | Stop reason: HOSPADM

## 2023-01-07 RX ORDER — SIMETHICONE 80 MG
80 TABLET,CHEWABLE ORAL EVERY 6 HOURS PRN
Status: DISCONTINUED | OUTPATIENT
Start: 2023-01-07 | End: 2023-01-12 | Stop reason: HOSPADM

## 2023-01-07 RX ORDER — SODIUM CHLORIDE 0.9 % (FLUSH) 0.9 %
5-40 SYRINGE (ML) INJECTION PRN
Status: DISCONTINUED | OUTPATIENT
Start: 2023-01-07 | End: 2023-01-12 | Stop reason: HOSPADM

## 2023-01-07 RX ORDER — INSULIN LISPRO 100 [IU]/ML
0-12 INJECTION, SOLUTION INTRAVENOUS; SUBCUTANEOUS EVERY 4 HOURS
Status: DISCONTINUED | OUTPATIENT
Start: 2023-01-07 | End: 2023-01-08

## 2023-01-07 RX ORDER — FERROUS SULFATE 325(65) MG
325 TABLET ORAL 2 TIMES DAILY WITH MEALS
Status: DISCONTINUED | OUTPATIENT
Start: 2023-01-08 | End: 2023-01-12 | Stop reason: HOSPADM

## 2023-01-07 RX ADMIN — SODIUM CHLORIDE, PRESERVATIVE FREE 10 ML: 5 INJECTION INTRAVENOUS at 22:29

## 2023-01-07 RX ADMIN — FUROSEMIDE 20 MG: 20 TABLET ORAL at 22:53

## 2023-01-07 RX ADMIN — HYDRALAZINE HYDROCHLORIDE 25 MG: 25 TABLET, FILM COATED ORAL at 22:53

## 2023-01-07 RX ADMIN — TAMSULOSIN HYDROCHLORIDE 0.4 MG: 0.4 CAPSULE ORAL at 22:53

## 2023-01-07 ASSESSMENT — ENCOUNTER SYMPTOMS
BACK PAIN: 0
NAUSEA: 0
COUGH: 0
DIARRHEA: 0
EYE PAIN: 0
WHEEZING: 0
ABDOMINAL PAIN: 0
SHORTNESS OF BREATH: 0
VOMITING: 0
EYE DISCHARGE: 0
SINUS PRESSURE: 0
EYE REDNESS: 0
SORE THROAT: 0
BLURRED VISION: 0

## 2023-01-07 ASSESSMENT — PAIN SCALES - GENERAL: PAINLEVEL_OUTOF10: 0

## 2023-01-07 ASSESSMENT — PAIN - FUNCTIONAL ASSESSMENT: PAIN_FUNCTIONAL_ASSESSMENT: NONE - DENIES PAIN

## 2023-01-07 NOTE — ED NOTES
Patient states his son normally puts all of his medications in a dispenser for him to take each week but the son has been out of town in the past few weeks; the patient says he \"might of messed up the pills. \"  When asked for a medication list, the patient told staff to get it out of his bag; staff found  food in his bag - the patient said he's only been eating once a day because he thought if he lost weight, his blood pressure would go down. LIP notified, patient given a food tray.      [facility]  [id number]

## 2023-01-08 PROBLEM — I44.1 AV BLOCK, MOBITZ 1: Status: ACTIVE | Noted: 2023-01-08

## 2023-01-08 PROBLEM — I50.33 ACUTE ON CHRONIC HEART FAILURE WITH PRESERVED EJECTION FRACTION (HCC): Status: ACTIVE | Noted: 2023-01-08

## 2023-01-08 LAB
ANION GAP SERPL CALCULATED.3IONS-SCNC: 8 MMOL/L (ref 7–16)
BACTERIA: NORMAL /HPF
BASOPHILS ABSOLUTE: 0.01 E9/L (ref 0–0.2)
BASOPHILS RELATIVE PERCENT: 0.1 % (ref 0–2)
BILIRUBIN URINE: NEGATIVE
BLOOD, URINE: NEGATIVE
BUN BLDV-MCNC: 25 MG/DL (ref 6–23)
CALCIUM SERPL-MCNC: 8.9 MG/DL (ref 8.6–10.2)
CHLORIDE BLD-SCNC: 104 MMOL/L (ref 98–107)
CLARITY: CLEAR
CO2: 27 MMOL/L (ref 22–29)
COLOR: YELLOW
CREAT SERPL-MCNC: 1.3 MG/DL (ref 0.7–1.2)
EKG ATRIAL RATE: 71 BPM
EKG P AXIS: 47 DEGREES
EKG Q-T INTERVAL: 536 MS
EKG QRS DURATION: 190 MS
EKG QTC CALCULATION (BAZETT): 498 MS
EKG R AXIS: -31 DEGREES
EKG T AXIS: 40 DEGREES
EKG VENTRICULAR RATE: 52 BPM
EOSINOPHILS ABSOLUTE: 0.09 E9/L (ref 0.05–0.5)
EOSINOPHILS RELATIVE PERCENT: 1.3 % (ref 0–6)
EPITHELIAL CELLS, UA: NORMAL /HPF
GFR SERPL CREATININE-BSD FRML MDRD: 55 ML/MIN/1.73
GLUCOSE BLD-MCNC: 140 MG/DL (ref 74–99)
GLUCOSE URINE: NEGATIVE MG/DL
HBA1C MFR BLD: 7.3 % (ref 4–5.6)
HCT VFR BLD CALC: 35.6 % (ref 37–54)
HEMOGLOBIN: 12.3 G/DL (ref 12.5–16.5)
IMMATURE GRANULOCYTES #: 0.01 E9/L
IMMATURE GRANULOCYTES %: 0.1 % (ref 0–5)
KETONES, URINE: NEGATIVE MG/DL
LEUKOCYTE ESTERASE, URINE: NEGATIVE
LYMPHOCYTES ABSOLUTE: 2.1 E9/L (ref 1.5–4)
LYMPHOCYTES RELATIVE PERCENT: 31.1 % (ref 20–42)
MCH RBC QN AUTO: 32.8 PG (ref 26–35)
MCHC RBC AUTO-ENTMCNC: 34.6 % (ref 32–34.5)
MCV RBC AUTO: 94.9 FL (ref 80–99.9)
METER GLUCOSE: 123 MG/DL (ref 74–99)
METER GLUCOSE: 161 MG/DL (ref 74–99)
METER GLUCOSE: 185 MG/DL (ref 74–99)
METER GLUCOSE: 266 MG/DL (ref 74–99)
MONOCYTES ABSOLUTE: 0.79 E9/L (ref 0.1–0.95)
MONOCYTES RELATIVE PERCENT: 11.7 % (ref 2–12)
NEUTROPHILS ABSOLUTE: 3.75 E9/L (ref 1.8–7.3)
NEUTROPHILS RELATIVE PERCENT: 55.7 % (ref 43–80)
NITRITE, URINE: NEGATIVE
PDW BLD-RTO: 13.3 FL (ref 11.5–15)
PH UA: 6.5 (ref 5–9)
PLATELET # BLD: 134 E9/L (ref 130–450)
PMV BLD AUTO: 10.1 FL (ref 7–12)
POTASSIUM REFLEX MAGNESIUM: 4 MMOL/L (ref 3.5–5)
PRO-BNP: 1087 PG/ML (ref 0–450)
PROTEIN UA: 100 MG/DL
RBC # BLD: 3.75 E12/L (ref 3.8–5.8)
RBC UA: NORMAL /HPF (ref 0–2)
SODIUM BLD-SCNC: 139 MMOL/L (ref 132–146)
SPECIFIC GRAVITY UA: 1.01 (ref 1–1.03)
TROPONIN, HIGH SENSITIVITY: 41 NG/L (ref 0–11)
UROBILINOGEN, URINE: 0.2 E.U./DL
WBC # BLD: 6.8 E9/L (ref 4.5–11.5)
WBC UA: NORMAL /HPF (ref 0–5)

## 2023-01-08 PROCEDURE — APPSS60 APP SPLIT SHARED TIME 46-60 MINUTES: Performed by: PHYSICIAN ASSISTANT

## 2023-01-08 PROCEDURE — 97161 PT EVAL LOW COMPLEX 20 MIN: CPT

## 2023-01-08 PROCEDURE — 84484 ASSAY OF TROPONIN QUANT: CPT

## 2023-01-08 PROCEDURE — 6370000000 HC RX 637 (ALT 250 FOR IP): Performed by: STUDENT IN AN ORGANIZED HEALTH CARE EDUCATION/TRAINING PROGRAM

## 2023-01-08 PROCEDURE — 83880 ASSAY OF NATRIURETIC PEPTIDE: CPT

## 2023-01-08 PROCEDURE — 6360000002 HC RX W HCPCS: Performed by: STUDENT IN AN ORGANIZED HEALTH CARE EDUCATION/TRAINING PROGRAM

## 2023-01-08 PROCEDURE — 2580000003 HC RX 258: Performed by: STUDENT IN AN ORGANIZED HEALTH CARE EDUCATION/TRAINING PROGRAM

## 2023-01-08 PROCEDURE — 81001 URINALYSIS AUTO W/SCOPE: CPT

## 2023-01-08 PROCEDURE — 6360000002 HC RX W HCPCS: Performed by: INTERNAL MEDICINE

## 2023-01-08 PROCEDURE — 80048 BASIC METABOLIC PNL TOTAL CA: CPT

## 2023-01-08 PROCEDURE — 1200000000 HC SEMI PRIVATE

## 2023-01-08 PROCEDURE — 85025 COMPLETE CBC W/AUTO DIFF WBC: CPT

## 2023-01-08 PROCEDURE — 99223 1ST HOSP IP/OBS HIGH 75: CPT | Performed by: INTERNAL MEDICINE

## 2023-01-08 PROCEDURE — 36415 COLL VENOUS BLD VENIPUNCTURE: CPT

## 2023-01-08 PROCEDURE — 83036 HEMOGLOBIN GLYCOSYLATED A1C: CPT

## 2023-01-08 PROCEDURE — 82962 GLUCOSE BLOOD TEST: CPT

## 2023-01-08 PROCEDURE — 97530 THERAPEUTIC ACTIVITIES: CPT

## 2023-01-08 RX ORDER — ATROPINE SULFATE 0.1 MG/ML
1 INJECTION INTRAVENOUS
Status: DISPENSED | OUTPATIENT
Start: 2023-01-08 | End: 2023-01-09

## 2023-01-08 RX ORDER — FUROSEMIDE 10 MG/ML
40 INJECTION INTRAMUSCULAR; INTRAVENOUS ONCE
Status: COMPLETED | OUTPATIENT
Start: 2023-01-08 | End: 2023-01-08

## 2023-01-08 RX ADMIN — CLOPIDOGREL BISULFATE 75 MG: 75 TABLET ORAL at 08:09

## 2023-01-08 RX ADMIN — SODIUM CHLORIDE, PRESERVATIVE FREE 10 ML: 5 INJECTION INTRAVENOUS at 01:20

## 2023-01-08 RX ADMIN — FERROUS SULFATE TAB 325 MG (65 MG ELEMENTAL FE) 325 MG: 325 (65 FE) TAB at 08:08

## 2023-01-08 RX ADMIN — TAMSULOSIN HYDROCHLORIDE 0.4 MG: 0.4 CAPSULE ORAL at 18:34

## 2023-01-08 RX ADMIN — FERROUS SULFATE TAB 325 MG (65 MG ELEMENTAL FE) 325 MG: 325 (65 FE) TAB at 16:01

## 2023-01-08 RX ADMIN — ENOXAPARIN SODIUM 40 MG: 100 INJECTION SUBCUTANEOUS at 08:09

## 2023-01-08 RX ADMIN — FUROSEMIDE 20 MG: 20 TABLET ORAL at 08:09

## 2023-01-08 RX ADMIN — FUROSEMIDE 40 MG: 10 INJECTION, SOLUTION INTRAMUSCULAR; INTRAVENOUS at 16:01

## 2023-01-08 RX ADMIN — HYDRALAZINE HYDROCHLORIDE 20 MG: 20 INJECTION INTRAMUSCULAR; INTRAVENOUS at 21:08

## 2023-01-08 RX ADMIN — FUROSEMIDE 20 MG: 20 TABLET ORAL at 21:08

## 2023-01-08 RX ADMIN — HYDRALAZINE HYDROCHLORIDE 25 MG: 25 TABLET, FILM COATED ORAL at 13:29

## 2023-01-08 RX ADMIN — INSULIN LISPRO 4 UNITS: 100 INJECTION, SOLUTION INTRAVENOUS; SUBCUTANEOUS at 17:04

## 2023-01-08 RX ADMIN — SODIUM CHLORIDE, PRESERVATIVE FREE 10 ML: 5 INJECTION INTRAVENOUS at 08:09

## 2023-01-08 RX ADMIN — ASPIRIN 81 MG: 81 TABLET, CHEWABLE ORAL at 08:08

## 2023-01-08 RX ADMIN — BRIMONIDINE TARTRATE 1 DROP: 2 SOLUTION OPHTHALMIC at 13:30

## 2023-01-08 RX ADMIN — ATORVASTATIN CALCIUM 10 MG: 10 TABLET, FILM COATED ORAL at 08:09

## 2023-01-08 RX ADMIN — SODIUM CHLORIDE, PRESERVATIVE FREE 10 ML: 5 INJECTION INTRAVENOUS at 21:17

## 2023-01-08 RX ADMIN — SERTRALINE HYDROCHLORIDE 75 MG: 50 TABLET ORAL at 08:08

## 2023-01-08 RX ADMIN — PANTOPRAZOLE SODIUM 40 MG: 40 TABLET, DELAYED RELEASE ORAL at 05:27

## 2023-01-08 RX ADMIN — HYDRALAZINE HYDROCHLORIDE 20 MG: 20 INJECTION INTRAMUSCULAR; INTRAVENOUS at 08:09

## 2023-01-08 RX ADMIN — HYDRALAZINE HYDROCHLORIDE 20 MG: 20 INJECTION INTRAMUSCULAR; INTRAVENOUS at 17:08

## 2023-01-08 RX ADMIN — HYDRALAZINE HYDROCHLORIDE 20 MG: 20 INJECTION INTRAMUSCULAR; INTRAVENOUS at 01:19

## 2023-01-08 RX ADMIN — BRIMONIDINE TARTRATE 1 DROP: 2 SOLUTION OPHTHALMIC at 16:05

## 2023-01-08 RX ADMIN — HYDRALAZINE HYDROCHLORIDE 25 MG: 25 TABLET, FILM COATED ORAL at 22:54

## 2023-01-08 RX ADMIN — LATANOPROST 1 DROP: 50 SOLUTION OPHTHALMIC at 01:12

## 2023-01-08 RX ADMIN — LATANOPROST 1 DROP: 50 SOLUTION OPHTHALMIC at 21:08

## 2023-01-08 RX ADMIN — HYDRALAZINE HYDROCHLORIDE 25 MG: 25 TABLET, FILM COATED ORAL at 08:14

## 2023-01-08 NOTE — CONSULTS
INPATIENT CARDIOLOGY CONSULT     Reason for Consult: Irregular heart rhythm    Cardiologist: Dr. Yue Alas    Requesting Physician: Dr. Paige Zhao    Date of Consultation: 1/8/2023    HISTORY OF PRESENT ILLNESS:   Patient is an 81year old AAM known to Dr. Tang Pereira. He is being seen in consultation this hospital admission by Dr. Yue Alas for evaluation and recommendations regarding \"irregular heart rhythm\". PMH: HTN, HLD, insulin requiring T2DM, CKD, chronic iron deficiency anemia, COPD with chronic respiratory failure, former tobacco and alcohol abuse, OA, osteoporosis, glaucoma, vision loss, history of TIA, Aldactone intolerance due to hyperkalemia, chronic RBBB, carotid artery disease, PAD, chronic HFpEF, CAD s/p reported CABG x 4 in 1994 and PCI in 2008 -- both at [facility], further details unknown    Patient presented to [facility] on January 7, 2023 with complaints of uncontrolled HTN. States he currently lives at home by himself, family helps. Does not require assistance with ambulation. Notes over the past 2 to 3 months of uncontrolled HTN on his home BP cuff -- systolic BP running 547B to 200s. He admits to strict medication compliance. Notes of chronic dyspnea on exertion, slightly worse as of lately. Additionally admits to peripheral edema, PND and brief episodes of palpitations. Denies chest pain, N/V, diaphoresis, dizziness, near-syncope or syncope. Notes of decreased urinary response to oral Lasix the last couple of days. Please note: past medical records were reviewed per electronic medical record (EMR) - see detailed reports under Past Medical/ Surgical History. PAST MEDICAL HISTORY:    CAD s/p CABG x 4 at [facility] in 1994, further details unknown  Inferior wall myocardial infarction. 1994 status post aortocoronary bypass grafting x4 ([facility] [place], further details unknown at this time).   November 13, 2007, adenosine nuclear stress test:  Normal examination with no evidence of left ventricular myocardium at risk for stress-induced ischemia with an ejection fraction of 46%. November 10, 2008, 2-D echocardiogram showed moderate concentric left ventricular hypertrophy. Ejection fraction visually estimated at 60%. Values were not well-visualized. (As interpreted by Dr. Ailin Grant.)  May 24, 2013, [place] nuclear stress study:  There is an area of relative hypokinesia of the LV myocardial contraction in the inferior wall toward the basal region where diminished radionuclide activity is seen and the post pharmacological stress suspicious for an area of pharmacological-induced ischemia. Planned for medical management with nitrate added at that time. LHC [facility] [place] - records not available. Most recent ischemic evaluation -- Lexiscan stress test 2/2021: Fixed inferolateral defect. No reversible ischemia. LVEF 64%. However, visually the LV appears severely dilated with moderate global hypokinesis and severe hypokinesis of the inferolateral wall. May 23, 2013, 2-D echocardiogram (as interpreted by Dr. Ailin Grant): Normal left ventricle size, mild concentric left ventricular hypertrophy. Ejection fraction visually estimated at 55%. Stage 2 diastolic dysfunction. September 22, 2014, 2-D echocardiogram (as interpreted by Dr. Ailin Grant): Normal left ventricle size. Moderate concentric left ventricular hypertrophy. Ejection fraction visually estimated at 70%. Most recent TTE, 3/2021 Dr. Francoise Aldrich: Normal LV size and function, EF 65±5%. Borderline septal hypokinesis. Stage I DD. Mildly dilated RV, TAPSE normal.  Moderately dilated LA. Moderate MAC.   Chronic HFpEF   PAD  Carotid artery disease, follows with Vascular surgery  Chronic RBBB  HTN  HLD, on statin therapy  Insulin requiring T2DM  CKD, baseline Cr 1.3  Chronic iron deficiency anemia  COPD with chronic respiratory failure, on home O2  Former tobacco and alcohol abuse  Osteoarthritis  Osteoporosis  Glaucoma: Status post surgery to left eye with cataract removal. 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  2007, status post most recent surgery to right eye  History of TIA (further details unknown)  ALDACTONE intolerance (hyperkalemia reaction)    PAST SURGICAL HISTORY:    Past Surgical History:   Procedure Laterality Date    CARDIAC SURGERY      CORONARY ARTERY BYPASS GRAFT  1994    x4    ECHO COMPL W DOP COLOR FLOW  5/23/2013         ECHO COMPL W DOP COLOR FLOW  5/23/2013         EYE SURGERY      HC INSERT PICC CATH, 5/> YRS  2/27/2021         LAPAROSCOPY N/A 2/26/2021    exploratory laparotomy lysis of adhesions performed by Nette Stockton MD at [address]:  Prior to Admission medications    Medication Sig Start Date End Date Taking? Authorizing Provider   sertraline (ZOLOFT) 25 MG tablet Take 75 mg by mouth 9/15/21   Historical Provider, MD   brinzolamide-brimonidine 1-0.2 % SUSP Apply to eye 6/17/22   Historical Provider, MD   clopidogrel (PLAVIX) 75 MG tablet Take 75 mg by mouth daily    Historical Provider, MD   hydrALAZINE (APRESOLINE) 25 MG tablet Take 25 mg by mouth 3 times daily    Historical Provider, MD   furosemide (LASIX) 20 MG tablet Take 20 mg by mouth 2 times daily    Historical Provider, MD   NIFEdipine (ADALAT CC) 30 MG extended release tablet Take 30 mg by mouth daily    Historical Provider, MD   potassium chloride (KLOR-CON) 10 MEQ extended release tablet Take 10 mEq by mouth daily    Historical Provider, MD   aspirin 81 MG chewable tablet Take 1 tablet by mouth daily 3/5/21   Opal Vizcarra MD   NONFORMULARY Omega XL, contains  Green lipped mussel oil extract, EPA, DHA, ETA, and HORTENCIA    Historical Provider, MD   carvedilol (COREG) 25 MG tablet Take 25 mg by mouth 2 times daily (with meals).     Historical Provider, MD   cloNIDine (CATAPRES) 0.2 MG/24HR Place 1 patch onto the skin once a week Changed on Mondays    Historical Provider, MD   Omega 3-6-9 Fatty Acids (TRIPLE OMEGA-3-6-9 PO) Take 1 tablet by mouth 2 times daily. Historical Provider, MD   latanoprost (XALATAN) 0.005 % ophthalmic solution Place 1 drop into the left eye nightly. Historical Provider, MD   simethicone (MYLICON) 80 MG chewable tablet Take 80 mg by mouth every 6 hours as needed for Flatulence. Historical Provider, MD   OXYGEN Inhale 4 L/min into the lungs. Patient not taking: Reported on 1/7/2023    Historical Provider, MD   brimonidine (ALPHAGAN) 0.2 % ophthalmic solution Place 1 drop into the left eye 2 times daily. Historical Provider, MD   ferrous sulfate 325 (65 FE) MG tablet Take 1 tablet by mouth 2 times daily (with meals). 5/28/13   Joe Casillas MD   ipratropium-albuterol (DUONEB) 0.5-2.5 (3) MG/3ML SOLN nebulizer solution Inhale 3 mLs into the lungs every 6 hours as needed for Shortness of Breath. 5/28/13   Joe Casillas MD   simvastatin (ZOCOR) 10 MG tablet Take 10 mg by mouth daily. Historical Provider, MD   tamsulosin (FLOMAX) 0.4 MG capsule Take 0.4 mg by mouth every evening. Historical Provider, MD   nitroGLYCERIN (NITROSTAT) 0.4 MG SL tablet Place 0.4 mg under the tongue every 5 minutes as needed. Patient not taking: Reported on 1/7/2023    Historical Provider, MD   docusate sodium (COLACE) 100 MG capsule Take 100 mg by mouth daily as needed.  Indications: Constipation    Historical Provider, MD       CURRENT MEDICATIONS:      Current Facility-Administered Medications:     atropine injection 1 mg, 1 mg, IntraVENous, Once PRN, Charito Reeves MD    aspirin chewable tablet 81 mg, 81 mg, Oral, Daily, Pat Box MD, 81 mg at 01/08/23 0808    brimonidine (ALPHAGAN) 0.2 % ophthalmic solution 1 drop, 1 drop, Left Eye, BID, Pat Box MD    [facility] carvedilol (COREG) tablet 25 mg, 25 mg, Oral, BID WC, Pat Box MD    [Held by provider] cloNIDine (CATAPRES) tablet 0.05 mg, 0.05 mg, Oral, BID, Satya Lowery MD    clopidogrel (PLAVIX) tablet 75 mg, 75 mg, Oral, Daily, Satya Lowery MD, 75 mg at 01/08/23 0809    docusate sodium (COLACE) capsule 100 mg, 100 mg, Oral, Daily PRN, Satya Lowery MD    ferrous sulfate (IRON 325) tablet 325 mg, 325 mg, Oral, BID WC, Satya Lowery MD, 325 mg at 01/08/23 3237    furosemide (LASIX) tablet 20 mg, 20 mg, Oral, BID, Satya Lowery MD, 20 mg at 01/08/23 6648    hydrALAZINE (APRESOLINE) tablet 25 mg, 25 mg, Oral, TID, Satya Lowery MD, 25 mg at 01/08/23 0814    insulin lispro (HUMALOG) injection vial 0-12 Units, 0-12 Units, SubCUTAneous, Q4H, Satya Lowery MD    ipratropium-albuterol (DUONEB) nebulizer solution 3 mL, 3 mL, Inhalation, Q6H PRN, Satya Lowery MD    latanoprost (XALATAN) 0.005 % ophthalmic solution 1 drop, 1 drop, Left Eye, Nightly, Satya Lowery MD, 1 drop at 01/08/23 0112    [Held by provider] NIFEdipine (ADALAT CC) extended release tablet 30 mg, 30 mg, Oral, Daily, Satya Lowery MD    nitroGLYCERIN (NITROSTAT) SL tablet 0.4 mg, 0.4 mg, SubLINGual, Q5 Min PRN, Satya Lowery MD    pantoprazole (PROTONIX) tablet 40 mg, 40 mg, Oral, QAM AC, Satya Lowery MD, 40 mg at 01/08/23 0527    sertraline (ZOLOFT) tablet 75 mg, 75 mg, Oral, Daily, Satya Lowery MD, 75 mg at 01/08/23 0062    simethicone (MYLICON) chewable tablet 80 mg, 80 mg, Oral, Q6H PRN, Satya Lowery MD    atorvastatin (LIPITOR) tablet 10 mg, 10 mg, Oral, Daily, Satya Lowery MD, 10 mg at 01/08/23 0809    tamsulosin (FLOMAX) capsule 0.4 mg, 0.4 mg, Oral, QPM, Satya Lowery MD, 0.4 mg at 01/07/23 2253    sodium chloride flush 0.9 % injection 5-40 mL, 5-40 mL, IntraVENous, 2 times per day, Satya Lowery MD, 10 mL at 01/08/23 0809    sodium chloride flush 0.9 % injection 5-40 mL, 5-40 mL, IntraVENous, PRN, Satya Lowery MD, 10 mL at 01/08/23 0120    0.9 % sodium chloride infusion, , IntraVENous, PRN, Satya Lowery MD    enoxaparin (LOVENOX) injection 40 mg, 40 mg, SubCUTAneous, Daily, Jonelle Santos MD, 40 mg at 01/08/23 0809    ondansetron (ZOFRAN-ODT) disintegrating tablet 4 mg, 4 mg, Oral, Q8H PRN **OR** ondansetron (ZOFRAN) injection 4 mg, 4 mg, IntraVENous, Q6H PRN, Jonelle Santos MD    polyethylene glycol (GLYCOLAX) packet 17 g, 17 g, Oral, Daily PRN, Jonelle Santos MD    acetaminophen (TYLENOL) tablet 650 mg, 650 mg, Oral, Q6H PRN **OR** acetaminophen (TYLENOL) suppository 650 mg, 650 mg, Rectal, Q6H PRN, Jonelle Santos MD    hydrALAZINE (APRESOLINE) injection 20 mg, 20 mg, IntraVENous, Q6H PRN, Jonelle Santos MD, 20 mg at 01/08/23 0809    glucose chewable tablet 16 g, 4 tablet, Oral, PRN, Jonelle Santos MD    dextrose bolus 10% 125 mL, 125 mL, IntraVENous, PRN **OR** dextrose bolus 10% 250 mL, 250 mL, IntraVENous, PRN, Jonelle Santos MD    glucagon (rDNA) injection 1 mg, 1 mg, SubCUTAneous, PRN, Jonelle Santos MD    dextrose 10 % infusion, , IntraVENous, Continuous PRN, Jonelle Santos MD    insulin lispro (HUMALOG) injection vial 0-8 Units, 0-8 Units, SubCUTAneous, TID WC, Jonelle Santos MD    insulin lispro (HUMALOG) injection vial 0-4 Units, 0-4 Units, SubCUTAneous, Nightly, Jonelle Santos MD    ALLERGIES:  Dorzolamide, Seasonal, and Spironolactone    SOCIAL HISTORY:    Lives at home by himself. Does not require assistance with ambulation. Previous tobacco abuse: quit 30-40 years ago, smoked for 17 years   Previous alcohol abuse: quit 30-40 years ago   Denies illicit drug use     FAMILY HISTORY:   Non-contributory at this time due to patient's advanced age. REVIEW OF SYSTEMS:     Negative except as noted above in HPI      PHYSICAL EXAM:   BP (!) 162/64   Pulse 74   Temp 97.8 °F (36.6 °C) (Oral)   Resp 16   Ht 5' 10.5\" (1.791 m)   Wt 190 lb (86.2 kg)   SpO2 98%   BMI 26.88 kg/m²   CONST:  Well developed, well nourished AAM who appears stated age. Awake, alert, cooperative, no apparent distress.   HEENT:   Head- Normocephalic, atraumatic. Eyes- Conjunctivae pink, anicteric. Neck-  No stridor, trachea midline, ? JVD -- patient sitting up in bedside chair   CHEST: Chest symmetrical and non-tender to palpation. No accessory muscle use or intercostal retractions. RESPIRATORY: Lung sounds - clear throughout fields  CARDIOVASCULAR:     No noted carotid bruit. Heart Ausculation- irregular rhythm with normal rate, no apparent murmur. PV: Trace - 1+ bilateral lower extremity edema, wrinkled discolored skin  ABDOMEN: Soft, non-tender to light palpation. Bowel sounds present. MS: Good muscle strength and tone. No atrophy or abnormal movements. SKIN: Warm and dry. NEURO / PSYCH: Oriented to person, place and time. Speech clear and appropriate. Follows all commands. Pleasant affect. DATA:    Telemetry: SR with blocked PACs versus Mobitz 1 AV Block    Diagnostic:  All diagnostic testing and lab work thus far this admission reviewed in detail. CXR 1/7/2023  Impression  Stable mobile chest appearance allowing for technical differences.     Labs:   CBC:   Recent Labs     01/07/23 1823 01/08/23 0107   WBC 5.9 6.8   HGB 11.8* 12.3*   HCT 34.7* 35.6*    134     BMP:   Recent Labs     01/07/23 1823 01/08/23  0107    139   K 3.9 4.0   CO2 29 27   BUN 27* 25*   CREATININE 1.3* 1.3*   LABGLOM 55 55   CALCIUM 8.9 8.9     HgA1c:   Lab Results   Component Value Date    LABA1C 7.3 (H) 01/08/2023       PT/INR:   Recent Labs     01/07/23 1823   PROTIME 13.3*   INR 1.2     FASTING LIPID PANEL:  Lab Results   Component Value Date/Time    CHOL 120 02/26/2021 03:45 AM    HDL 48 02/26/2021 03:45 AM    LDLCALC 51 02/26/2021 03:45 AM    TRIG 106 02/26/2021 03:45 AM     Component Ref Range & Units 1/8/23 0520 8/29/22 1155 2/21/21 1000 12/30/20 2242 4/17/15 1433 9/18/14 0139   Color, UA Straw/Yellow Yellow  Yellow  DARK YELLOW Abnormal   Yellow  Yellow  Yellow    Clarity, UA Clear Clear  Clear  Clear  Clear  Clear  Clear    Glucose, Ur Negative mg/dL Negative  Negative  Negative  Negative  Negative  Negative    Bilirubin Urine Negative Negative  Negative  Negative  Negative  Negative  Negative    Ketones, Urine Negative mg/dL Negative  Negative  Negative  Negative  Negative  Negative    Specific Gravity, UA 1.005 - 1.030 1.015  1.020  1.020  1.015  1.015  1.010    Blood, Urine Negative Negative  Negative  Negative  Negative  Negative  TRACE-INTACT    pH, UA 5.0 - 9.0 6.5  6.0  5.5  6.0  5.0  6.0    Protein, UA Negative mg/dL 100 Abnormal   30 Abnormal   Negative  Negative  Negative  30 Abnormal     Urobilinogen, Urine <2.0 E.U./dL 0.2  0.2  0.2  0.2  0.2  1.0    Nitrite, Urine Negative Negative  Negative  Negative  Negative  Negative  Negative    Leukocyte Esterase, Urine Negative Negative  Negative  Negative  Negative  Negative  Negative      Component Ref Range & Units 1/8/23 0107 1/7/23 1823 12/11/22 1454 12/11/22 1315   Troponin, High Sensitivity 0 - 11 ng/L 41 High   45 High  CM  43 High  CM  46 High  CM        ASSESSMENT:  Hypertensive urgency  Blocked PACs versus Mobitz type I AV Block   Acute on chronic HFpEF, appears mildly hypervolemic  CAD s/p reported CABG x 4 in 1994 and PCI in 2008 -- both at [place] in [place], further details unknown. Non-ischemic Lexiscan stress test 2/2021.   Clinically stable  Chronic RBBB  Chronically elevated troponin  HLD, on statin therapy  Insulin requiring T2DM  Carotid artery disease  PAD  CKD, stable  Chronic iron deficiency anemia  COPD with chronic respiratory failure, on home O2  Former tobacco and alcohol abuse  History of questionable TIA  Aldactone intolerance due to hyperkalemia  OA      RECOMMENDATIONS:  Check pro-BNP  Assess need for IV diuresis   Resume Nifedipine   Titrate Hydralazine as needed for BP control  Aldactone intolerance   Continue to monitor on telemetry   Assess for chronotropic incompetence prior to discharge  Outpatient one week event monitor upon discharge   Rest as per primary service and other consultants   Above as per Dr. Puneet Fontanez -- A+P discussed and made in collaboration with him. Further recommendations to follow        NOTE: This report was transcribed using voice recognition software. Every effort was made to ensure accuracy; however, inadvertent computerized transcription errors may be present.       Lorenzo Angeles, [address] [place] [address], [place] [address] Cardiology    Electronically signed by Selina Samano PA-C on 1/8/2023 at 9:34 AM

## 2023-01-08 NOTE — PROGRESS NOTES
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 There are 12 steps and 1 rail to 2nd floor bed and bath. Pt ambulated with no AD PTA. OBJECTIVE:   Initial Evaluation  Date: 1/8/23 Treatment Short Term/ Long Term   Goals   Was pt agreeable to Eval/treatment? Yes     Does pt have pain? None     Bed Mobility  Rolling: Indep  Supine to sit: Indep  Sit to supine: Indep  Scooting: Indep     Transfers Sit to stand: S/Indep  Stand to sit: Indep  Stand pivot: Indep     Ambulation   30 feet with no AD indep  200 feet with no AD indep   Stair negotiation: ascended and descended  NA-predict indep based on mobility  3-4 steps with 1 rail Indep   ROM WFL     MMT WFL     AM-PAC 6 Clicks 52/85       Pt is A & O x 3   Sensation:  Pt denies numbness and tingling to extremities  Edema:  None  Balance: sitting:Indep and standing: indep w/o AD  Endurance: Good    ASSESSMENT:    Comments:  in bed on arrival in no distress. Blind total RT but states can see images with LT with some eye sight. Lives alone and bed/bath second floor as was total indep with going up/down 2nd floor steps w/o issues. States cooks and cleans himself and is indep @ home but dmitri helps get his food and do the things he cannot due 2/2 eye sight loss. Transfer to EOB was rapid and w/o any delays; no bed rail used to assist the transfer. Transfer to stand was also indep w/o any delays or staggered mvmt patterns. No dizziness expressed during/post all positional changes. Amb w/o any AD with a normal age related pace and no instability. No drifting, lateral veering or LOB with dynamic mobility w/o AD.   Wanted to sit in chair; chair set up with chair alarm. Alarm pad in place and Aid trying to find the unit. Call light and wall phone placed in lap and phone on table. Very appreciative for care. Treatment:  Patient practiced and was instructed in the following treatment:    Bed mob  Transfers  Amb  Eval    Pt's/ family goals   1. Home    Patient and or family understand(s) diagnosis, prognosis, and plan of care. PLAN:    PT care will be provided in accordance with the objectives noted above. Exercises and functional mobility practice will be used as well as appropriate assistive devices or modalities to obtain goals. Patient and family education will also be administered as needed. Frequency of treatments: 2-5x/week x 1-2 weeks. Total Treatment Time  15 minutes     Evaluation Time includes thorough review of current medical information, gathering information on past medical history/social history and prior level of function, completion of standardized testing/informal observation of tasks, assessment of data and education on plan of care and goals.     CPT codes:  [x] Low Complexity PT evaluation 04649  [] Moderate Complexity PT evaluation 45671  [] High Complexity PT evaluation 68112  [] PT Re-evaluation 65437  [] Gait training 55679 ** minutes  [] Manual therapy 88232 ** minutes  [x] Therapeutic activities 96621 ** minutes  [] Therapeutic exercises 89100 ** minutes  [] Neuromuscular reeducation 88139 ** minutes    Jonelle Coelho PT, RRT,

## 2023-01-08 NOTE — ED NOTES
SBAR receipt confirmed with [facility]S; okay to go up off monitor per [facility] [address], RN  01/07/23 2050

## 2023-01-08 NOTE — PROGRESS NOTES
Cmr called and stated patient in mobitz 2 block, 6 beat run. Marci Mancuso notified via perfect serve. Said she saw patient this a.m. and dr. Devin Higginbotham would be up to assess. Patient denies complaints.

## 2023-01-08 NOTE — H&P
[facility] History and Physical      CHIEF COMPLAINT: Generalized weakness    History of Present Illness: 67-year-old male with a past medical history of diabetes blindness of both eyes, CHF, asthma, COPD, CAD status post stents x4, and TIA presents emergency department for generalized weakness and high blood pressures. Patient states that his at home blood pressure reads were over 638 systolic. Patient does admit to weakness over the last few days. Patient states that he lives with his son at home however his son is out of town and has been unable to take care of himself. Patient states he has been having difficulties performing activities of daily living. Patient denies nausea, vomiting, diarrhea, cough, shortness of breath, headache, numbness, or tingling. Informant(s) for H&P: Patient    REVIEW OF SYSTEMS:  A comprehensive review of systems was negative except for: what is in the HPI      PMH:  Past Medical History:   Diagnosis Date    Blind right eye     CAD (coronary artery disease)     1994 CABG x 4    CHF (congestive heart failure) ([facility])     Chronic respiratory failure ([address] 9/22/2014    CKD (chronic kidney disease) stage 3, GFR 30-59 ml/min ([facility])     Diabetes mellitus ([address]     Diabetes mellitus type 2, controlled ([address] 5/23/2013    HLD (hyperlipidemia)     Hypertension     TIA (transient ischemic attack)        Surgical History:  Past Surgical History:   Procedure Laterality Date    CARDIAC SURGERY      CORONARY ARTERY BYPASS GRAFT  1994    x4    ECHO COMPL W DOP COLOR FLOW  5/23/2013         ECHO COMPL W DOP COLOR FLOW  5/23/2013         EYE SURGERY      HC INSERT PICC CATH, 5/> YRS  2/27/2021         LAPAROSCOPY N/A 2/26/2021    exploratory laparotomy lysis of adhesions performed by Knean Watt MD at [facility] OR       Medications Prior to Admission:    Prior to Admission medications    Medication Sig Start Date End Date Taking?  Authorizing Provider   sertraline (ZOLOFT) 25 MG tablet Take 75 mg by mouth 9/15/21   Historical Provider, MD   brinzolamide-brimonidine 1-0.2 % SUSP Apply to eye 6/17/22   Historical Provider, MD   clopidogrel (PLAVIX) 75 MG tablet Take 75 mg by mouth daily    Historical Provider, MD   omeprazole (PRILOSEC) 20 MG delayed release capsule Take 20 mg by mouth daily    Historical Provider, MD   hydrALAZINE (APRESOLINE) 25 MG tablet Take 25 mg by mouth 3 times daily    Historical Provider, MD   furosemide (LASIX) 20 MG tablet Take 20 mg by mouth 2 times daily    Historical Provider, MD   NIFEdipine (ADALAT CC) 30 MG extended release tablet Take 30 mg by mouth daily    Historical Provider, MD   potassium chloride (KLOR-CON) 10 MEQ extended release tablet Take 10 mEq by mouth daily    Historical Provider, MD   aspirin 81 MG chewable tablet Take 1 tablet by mouth daily 3/5/21   Judith Cordon MD   insulin lispro (HUMALOG) 100 UNIT/ML injection vial Inject 0-12 Units into the skin every 4 hours 3/4/21   Judith Cordon MD   NONFORMULARY Omega XL, contains  Green lipped mussel oil extract, EPA, DHA, ETA, and HORTENCIA    Historical Provider, MD   carvedilol (COREG) 25 MG tablet Take 25 mg by mouth 2 times daily (with meals). Historical Provider, MD   cloNIDine (CATAPRES) 0.2 MG/24HR Place 2 patches onto the skin once a week. Changed on Mondays    Historical Provider, MD   Omega 3-6-9 Fatty Acids (TRIPLE OMEGA-3-6-9 PO) Take 1 tablet by mouth 2 times daily. Historical Provider, MD   latanoprost (XALATAN) 0.005 % ophthalmic solution Place 1 drop into the left eye nightly. Historical Provider, MD   simethicone (MYLICON) 80 MG chewable tablet Take 80 mg by mouth every 6 hours as needed for Flatulence. Historical Provider, MD   OXYGEN Inhale 4 L/min into the lungs. Historical Provider, MD   brimonidine (ALPHAGAN) 0.2 % ophthalmic solution Place 1 drop into the left eye 2 times daily.     Historical Provider, MD   ferrous sulfate 325 (65 FE) MG tablet Take 1 tablet by mouth 2 times daily (with meals). 5/28/13   Eriac Fernando MD   ipratropium-albuterol (DUONEB) 0.5-2.5 (3) MG/3ML SOLN nebulizer solution Inhale 3 mLs into the lungs every 6 hours as needed for Shortness of Breath. 5/28/13   Erica Fernando MD   simvastatin (ZOCOR) 10 MG tablet Take 10 mg by mouth daily. Historical Provider, MD   tamsulosin (FLOMAX) 0.4 MG capsule Take 0.4 mg by mouth every evening. Historical Provider, MD   nitroGLYCERIN (NITROSTAT) 0.4 MG SL tablet Place 0.4 mg under the tongue every 5 minutes as needed. Historical Provider, MD   docusate sodium (COLACE) 100 MG capsule Take 100 mg by mouth daily as needed. Indications: Constipation    Historical Provider, MD       Allergies:    Dorzolamide, Seasonal, and Spironolactone    Social History:    reports that he quit smoking about 40 years ago. His smoking use included cigarettes. He smoked an average of 1.5 packs per day. He quit smokeless tobacco use about 38 years ago. He reports that he does not drink alcohol and does not use drugs. Family History:   family history is not on file.        PHYSICAL EXAM:  Vitals:  BP (!) 176/68   Pulse 56   Temp 97.3 °F (36.3 °C) (Oral)   Resp 16   Ht 5' 10.5\" (1.791 m)   Wt 190 lb (86.2 kg)   SpO2 98%   BMI 26.88 kg/m²     General Appearance: alert and oriented to person, place and time and in no acute distress  Skin: warm and dry  Head: normocephalic and atraumatic  Eyes: Cataracts, bilateral blindness  conjunctivae normal  Neck: neck supple and non tender without mass   Pulmonary/Chest: clear to auscultation bilaterally- no wheezes, rales or rhonchi, normal air movement, no respiratory distress  Cardiovascular: normal rate, normal S1 and S2 and no carotid bruits  Abdomen: soft, non-tender, non-distended, normal bowel sounds, no masses or organomegaly  Extremities: no cyanosis, no clubbing and no edema  Neurologic: no cranial nerve deficit and speech normal        LABS:  Recent Labs 01/07/23 1823      K 3.9      CO2 29   BUN 27*   CREATININE 1.3*   GLUCOSE 200*   CALCIUM 8.9       Recent Labs     01/07/23 1823   WBC 5.9   RBC 3.64*   HGB 11.8*   HCT 34.7*   MCV 95.3   MCH 32.4   MCHC 34.0   RDW 13.3      MPV 10.1       No results for input(s): POCGLU in the last 72 hours. CBC:   Lab Results   Component Value Date/Time    WBC 5.9 01/07/2023 06:23 PM    RBC 3.64 01/07/2023 06:23 PM    HGB 11.8 01/07/2023 06:23 PM    HCT 34.7 01/07/2023 06:23 PM    MCV 95.3 01/07/2023 06:23 PM    MCH 32.4 01/07/2023 06:23 PM    MCHC 34.0 01/07/2023 06:23 PM    RDW 13.3 01/07/2023 06:23 PM     01/07/2023 06:23 PM    MPV 10.1 01/07/2023 06:23 PM     CMP:    Lab Results   Component Value Date/Time     01/07/2023 06:23 PM    K 3.9 01/07/2023 06:23 PM    K 4.0 12/11/2022 01:15 PM     01/07/2023 06:23 PM    CO2 29 01/07/2023 06:23 PM    BUN 27 01/07/2023 06:23 PM    CREATININE 1.3 01/07/2023 06:23 PM    GFRAA >60 08/29/2022 11:45 AM    LABGLOM 55 01/07/2023 06:23 PM    GLUCOSE 200 01/07/2023 06:23 PM    PROT 6.1 12/11/2022 01:15 PM    LABALBU 3.4 12/11/2022 01:15 PM    CALCIUM 8.9 01/07/2023 06:23 PM    BILITOT 0.5 12/11/2022 01:15 PM    ALKPHOS 74 12/11/2022 01:15 PM    AST 29 12/11/2022 01:15 PM    ALT 39 12/11/2022 01:15 PM       Radiology:   XR CHEST PORTABLE   Final Result   Stable mobile chest appearance allowing for technical differences. EKG:     ASSESSMENT:      Principal Problem:    Essential hypertension  Active Problems:    Hypertensive urgency  Resolved Problems:    * No resolved hospital problems. *      PLAN:    1. Hypertensive urgency-continue patient's home medications including hydralazine, nifedipine, clonidine patch, and hydralazine as needed. Consider nephrology consultation if unable to control blood pressure. Blood pressure every 8 hours. 2.  Generalized weakness-continue physical therapy and Occupational Therapy evaluation.   Pending social work consult. 3.  Diabetes type 2-continue insulin regimen. SSI PRN. Blood sugar checks [facility] S.  Diabetic diet. 4.  GERD-continue PPI  5. CAD-continue aspirin, Plavix and statin  6. CHF-continue Coreg. Continue Lasix twice a day. 7.  Hyperlipidemia-continue statin    Code Status: Full code  DVT prophylaxis: Lovenox      NOTE: This report was transcribed using voice recognition software. Every effort was made to ensure accuracy; however, inadvertent computerized transcription errors may be present.   Electronically signed by Caitlin James MD on 1/7/2023 at 8:33 PM

## 2023-01-08 NOTE — PROGRESS NOTES
Patient's son Keyla Shelton updated on patient condition and plan of care.     Electronically signed by Gretel Blizzard, RN on 1/8/2023 at 11:10 AM

## 2023-01-09 LAB
METER GLUCOSE: 134 MG/DL (ref 74–99)
METER GLUCOSE: 188 MG/DL (ref 74–99)
METER GLUCOSE: 212 MG/DL (ref 74–99)
METER GLUCOSE: 226 MG/DL (ref 74–99)

## 2023-01-09 PROCEDURE — 2580000003 HC RX 258: Performed by: STUDENT IN AN ORGANIZED HEALTH CARE EDUCATION/TRAINING PROGRAM

## 2023-01-09 PROCEDURE — 1200000000 HC SEMI PRIVATE

## 2023-01-09 PROCEDURE — 6370000000 HC RX 637 (ALT 250 FOR IP): Performed by: INTERNAL MEDICINE

## 2023-01-09 PROCEDURE — 82962 GLUCOSE BLOOD TEST: CPT

## 2023-01-09 PROCEDURE — 97535 SELF CARE MNGMENT TRAINING: CPT

## 2023-01-09 PROCEDURE — 6360000002 HC RX W HCPCS: Performed by: STUDENT IN AN ORGANIZED HEALTH CARE EDUCATION/TRAINING PROGRAM

## 2023-01-09 PROCEDURE — 2700000000 HC OXYGEN THERAPY PER DAY

## 2023-01-09 PROCEDURE — 6370000000 HC RX 637 (ALT 250 FOR IP): Performed by: STUDENT IN AN ORGANIZED HEALTH CARE EDUCATION/TRAINING PROGRAM

## 2023-01-09 PROCEDURE — 97165 OT EVAL LOW COMPLEX 30 MIN: CPT

## 2023-01-09 RX ORDER — NIFEDIPINE 30 MG/1
90 TABLET, FILM COATED, EXTENDED RELEASE ORAL DAILY
Status: DISCONTINUED | OUTPATIENT
Start: 2023-01-09 | End: 2023-01-12 | Stop reason: HOSPADM

## 2023-01-09 RX ORDER — HYDRALAZINE HYDROCHLORIDE 50 MG/1
50 TABLET, FILM COATED ORAL 3 TIMES DAILY
Status: DISCONTINUED | OUTPATIENT
Start: 2023-01-09 | End: 2023-01-12 | Stop reason: HOSPADM

## 2023-01-09 RX ADMIN — BRIMONIDINE TARTRATE 1 DROP: 2 SOLUTION OPHTHALMIC at 17:41

## 2023-01-09 RX ADMIN — HYDRALAZINE HYDROCHLORIDE 50 MG: 50 TABLET ORAL at 14:02

## 2023-01-09 RX ADMIN — SODIUM CHLORIDE, PRESERVATIVE FREE 10 ML: 5 INJECTION INTRAVENOUS at 20:46

## 2023-01-09 RX ADMIN — HYDRALAZINE HYDROCHLORIDE 50 MG: 50 TABLET ORAL at 20:46

## 2023-01-09 RX ADMIN — FERROUS SULFATE TAB 325 MG (65 MG ELEMENTAL FE) 325 MG: 325 (65 FE) TAB at 17:41

## 2023-01-09 RX ADMIN — PANTOPRAZOLE SODIUM 40 MG: 40 TABLET, DELAYED RELEASE ORAL at 06:20

## 2023-01-09 RX ADMIN — ASPIRIN 81 MG: 81 TABLET, CHEWABLE ORAL at 09:37

## 2023-01-09 RX ADMIN — FUROSEMIDE 20 MG: 20 TABLET ORAL at 20:46

## 2023-01-09 RX ADMIN — ENOXAPARIN SODIUM 40 MG: 100 INJECTION SUBCUTANEOUS at 09:39

## 2023-01-09 RX ADMIN — SERTRALINE HYDROCHLORIDE 75 MG: 50 TABLET ORAL at 09:38

## 2023-01-09 RX ADMIN — HYDRALAZINE HYDROCHLORIDE 25 MG: 25 TABLET, FILM COATED ORAL at 09:37

## 2023-01-09 RX ADMIN — ATORVASTATIN CALCIUM 10 MG: 10 TABLET, FILM COATED ORAL at 09:37

## 2023-01-09 RX ADMIN — LATANOPROST 1 DROP: 50 SOLUTION OPHTHALMIC at 20:47

## 2023-01-09 RX ADMIN — FERROUS SULFATE TAB 325 MG (65 MG ELEMENTAL FE) 325 MG: 325 (65 FE) TAB at 09:38

## 2023-01-09 RX ADMIN — HYDRALAZINE HYDROCHLORIDE 20 MG: 20 INJECTION INTRAMUSCULAR; INTRAVENOUS at 05:39

## 2023-01-09 RX ADMIN — FUROSEMIDE 20 MG: 20 TABLET ORAL at 09:37

## 2023-01-09 RX ADMIN — CLOPIDOGREL BISULFATE 75 MG: 75 TABLET ORAL at 09:38

## 2023-01-09 RX ADMIN — NIFEDIPINE 90 MG: 30 TABLET, EXTENDED RELEASE ORAL at 11:34

## 2023-01-09 RX ADMIN — INSULIN LISPRO 2 UNITS: 100 INJECTION, SOLUTION INTRAVENOUS; SUBCUTANEOUS at 17:41

## 2023-01-09 RX ADMIN — TAMSULOSIN HYDROCHLORIDE 0.4 MG: 0.4 CAPSULE ORAL at 17:41

## 2023-01-09 RX ADMIN — BRIMONIDINE TARTRATE 1 DROP: 2 SOLUTION OPHTHALMIC at 09:40

## 2023-01-09 RX ADMIN — SODIUM CHLORIDE, PRESERVATIVE FREE 10 ML: 5 INJECTION INTRAVENOUS at 09:41

## 2023-01-09 ASSESSMENT — PAIN SCALES - GENERAL: PAINLEVEL_OUTOF10: 0

## 2023-01-09 NOTE — CARE COORDINATION
Met with patient about diagnosis and discharge plan of care. Pt admit for hypertension/bradycardia. Bradycardia on tele, 30's-40's, blood pressure meds on hold. Pt lives alone in 2 story home. Has cane, no other DME. Therapy score-20/24. Pt follows at [facility] with Placedo Krabbe. His meds are delivered through [facility] and home o2 1-2L/nc also through [facility]. Pt declines any needs for home. Will continue to follow-mjo    The Plan for Transition of Care is related to the following treatment goals: home     The Patient and/or patient representative pt was provided with a choice of provider and agrees   with the discharge plan. [x] Yes [] No    Freedom of choice list was provided with basic dialogue that supports the patient's individualized plan of care/goals, treatment preferences and shares the quality data associated with the providers.  [x] Yes [] No

## 2023-01-09 NOTE — PROGRESS NOTES
[facility] Quality Flow/Interdisciplinary Rounds Progress Note        Quality Flow Rounds held on January 9, 2023    Disciplines Attending:  Bedside Nurse, , , and Nursing Unit Leadership    Taj Hilario was admitted on 1/7/2023  5:28 PM    Anticipated Discharge Date:       Disposition:    Justo Score:  Justo Scale Score: 19    Readmission Risk              Risk of Unplanned Readmission:  21           Discussed patient goal for the day, patient clinical progression, and barriers to discharge.   The following Goal(s) of the Day/Commitment(s) have been identified:   monitor telemetry/BP/HR w/ BP meds adjustments, discharge [address], RN  January 9, 2023

## 2023-01-09 NOTE — PROGRESS NOTES
[facility] Cardiology Inpatient Progress Note    Patient is a 81 y.o. male of [place], [place] seen in hospital follow up. Chief complaint: Irregular heartbeat/HTN    HPI: No CP or SOB. PAST MEDICAL HISTORY:    CAD s/p CABG x 4 at [place] in [place] in 1994, further details unknown  Inferior wall myocardial infarction. 1994 status post aortocoronary bypass grafting x4 ([facility] in [place], further details unknown at this time). November 13, 2007, adenosine nuclear stress test:  Normal examination with no evidence of left ventricular myocardium at risk for stress-induced ischemia with an ejection fraction of 46%. November 10, 2008, 2-D echocardiogram showed moderate concentric left ventricular hypertrophy. Ejection fraction visually estimated at 60%. Values were not well-visualized. (As interpreted by Dr. Mohsen Seaman.)  May 24, 2013, [facility] nuclear stress study:  There is an area of relative hypokinesia of the LV myocardial contraction in the inferior wall toward the basal region where diminished radionuclide activity is seen and the post pharmacological stress suspicious for an area of pharmacological-induced ischemia. Planned for medical management with nitrate added at that time. LHC and stents placed 2018 ([facility] [place]) per patient - records not available. Most recent ischemic evaluation -- Lexiscan stress test 2/2021: Fixed inferolateral defect. No reversible ischemia. LVEF 64%. However, visually the LV appears severely dilated with moderate global hypokinesis and severe hypokinesis of the inferolateral wall. May 23, 2013, 2-D echocardiogram (as interpreted by Dr. Mohsen Seaman): Normal left ventricle size, mild concentric left ventricular hypertrophy. Ejection fraction visually estimated at 55%. Stage 2 diastolic dysfunction. September 22, 2014, 2-D echocardiogram (as interpreted by Dr. Mohsen Seaman): Normal left ventricle size. Moderate concentric left ventricular hypertrophy.   Ejection fraction visually estimated at 70%. Most recent TTE, 3/2021 Dr. Rylee Castro: Normal LV size and function, EF 65±5%. Borderline septal hypokinesis. Stage I DD. Mildly dilated RV, TAPSE normal.  Moderately dilated LA. Moderate MAC.   Chronic HFpEF   PAD  Carotid artery disease, follows with Vascular surgery  Chronic RBBB  HTN  HLD, on statin therapy  Insulin requiring T2DM  CKD, baseline Cr 1.3  Chronic iron deficiency anemia  COPD with chronic respiratory failure, on home O2  Former tobacco and alcohol abuse  Osteoarthritis  Osteoporosis  Glaucoma: Status post surgery to left eye with cataract removal. 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  2007, status post most recent surgery to right eye  History of TIA (further details unknown)  ALDACTONE intolerance (hyperkalemia reaction)    Patient Active Problem List   Diagnosis    COPD exacerbation ([address]    Chest pain    Hypertension    Coronary artery disease    Diabetes mellitus type 2, controlled ([address]    Hyperlipidemia    BPH (benign prostatic hyperplasia)    Hyperkalemia    Blind right eye    CKD (chronic kidney disease) stage 3, GFR 30-59 ml/min (HCC)    CAD (coronary artery disease)    Acute decompensated heart failure (HCC)    Chronic respiratory failure (HCC)    EMELY (acute kidney injury) ([address]    SBO (small bowel obstruction) ([address]    Cerebrovascular accident (CVA) ([address]    Hypernatremia    Uncontrolled type 2 diabetes mellitus with hyperglycemia (HCC)    Thrombocytopenia ([address]    Essential hypertension    Hypertensive urgency    AV block, Mobitz 1    Acute on chronic heart failure with preserved ejection fraction (HCC)       Allergies   Allergen Reactions    Dorzolamide Swelling    Seasonal      Other reaction(s): SWELLING-LOWER EYE LIDS    Spironolactone Other (See Comments)     Hyperkalemia--4/16/15  hyperkalemia  Hyperkalemia--4/16/15       Current Facility-Administered Medications   Medication Dose Route Frequency Provider Last Rate Last Admin    aspirin chewable tablet 81 mg  81 mg Oral Daily Simpson Primrose, MD   81 mg at 01/08/23 0808    brimonidine (ALPHAGAN) 0.2 % ophthalmic solution 1 drop  1 drop Left Eye BID Simpson Primrose, MD   1 drop at 01/08/23 1605    [Held by provider] carvedilol (COREG) tablet 25 mg  25 mg Oral BID  Simpson Primrose, MD        [Held by provider] cloNIDine (CATAPRES) tablet 0.05 mg  0.05 mg Oral BID Simpson Primrose, MD        clopidogrel (PLAVIX) tablet 75 mg  75 mg Oral Daily Simpson Primrose, MD   75 mg at 01/08/23 0809    docusate sodium (COLACE) capsule 100 mg  100 mg Oral Daily PRN Simpson Primrose, MD        ferrous sulfate (IRON 325) tablet 325 mg  325 mg Oral BID  Simpson Primrose, MD   325 mg at 01/08/23 1601    furosemide (LASIX) tablet 20 mg  20 mg Oral BID Simpson Primrose, MD   20 mg at 01/08/23 2108    hydrALAZINE (APRESOLINE) tablet 25 mg  25 mg Oral TID Simpson Primrose, MD   25 mg at 01/08/23 2254    ipratropium-albuterol (DUONEB) nebulizer solution 3 mL  3 mL Inhalation Q6H PRN Simpson Primrose, MD        latanoprost (XALATAN) 0.005 % ophthalmic solution 1 drop  1 drop Left Eye Nightly Simpson Primrose, MD   1 drop at 01/08/23 2108    [Held by provider] NIFEdipine (ADALAT CC) extended release tablet 30 mg  30 mg Oral Daily Simpson Primrose, MD        nitroGLYCERIN (NITROSTAT) SL tablet 0.4 mg  0.4 mg SubLINGual Q5 Min PRN Simpson Primrose, MD        pantoprazole (PROTONIX) tablet 40 mg  40 mg Oral QAM AC Simpson Primrose, MD   40 mg at 01/09/23 6474    sertraline (ZOLOFT) tablet 75 mg  75 mg Oral Daily Simpson Primrose, MD   75 mg at 01/08/23 0710    simethicone (MYLICON) chewable tablet 80 mg  80 mg Oral Q6H PRN Simpson Primrose, MD        atorvastatin (LIPITOR) tablet 10 mg  10 mg Oral Daily Simpson Primrose, MD   10 mg at 01/08/23 0809    tamsulosin (FLOMAX) capsule 0.4 mg  0.4 mg Oral QPM Simpson Primrose, MD   0.4 mg at 01/08/23 2560    sodium chloride flush 0.9 % injection 5-40 mL  5-40 mL IntraVENous 2 times per day Manford Cranker, MD   10 mL at 01/08/23 2117    sodium chloride flush 0.9 % injection 5-40 mL  5-40 mL IntraVENous PRN Manford Cranker, MD   10 mL at 01/08/23 0120    0.9 % sodium chloride infusion   IntraVENous PRN Manford Cranker, MD        enoxaparin (LOVENOX) injection 40 mg  40 mg SubCUTAneous Daily Manford Cranker, MD   40 mg at 01/08/23 0809    ondansetron (ZOFRAN-ODT) disintegrating tablet 4 mg  4 mg Oral Q8H PRN Manford Cranker, MD        Or    ondansetron [facility]) injection 4 mg  4 mg IntraVENous Q6H PRN Manford Cranker, MD        polyethylene glycol (GLYCOLAX) packet 17 g  17 g Oral Daily PRN Manford Cranker, MD        acetaminophen (TYLENOL) tablet 650 mg  650 mg Oral Q6H PRN Manford Cranker, MD        Or    acetaminophen (TYLENOL) suppository 650 mg  650 mg Rectal Q6H PRN Manford Cranker, MD        hydrALAZINE (APRESOLINE) injection 20 mg  20 mg IntraVENous Q6H PRN Manford Cranker, MD   20 mg at 01/09/23 0539    glucose chewable tablet 16 g  4 tablet Oral PRN Manford Cranker, MD        dextrose bolus 10% 125 mL  125 mL IntraVENous PRN Manford Cranker, MD        Or    dextrose bolus 10% 250 mL  250 mL IntraVENous PRN Manford Cranker, MD        glucagon (rDNA) injection 1 mg  1 mg SubCUTAneous PRN Manford Cranker, MD        dextrose 10 % infusion   IntraVENous Continuous PRN Manford Cranker, MD        insulin lispro (HUMALOG) injection vial 0-8 Units  0-8 Units SubCUTAneous TID  Manford Cranker, MD   4 Units at 01/08/23 1704    insulin lispro (HUMALOG) injection vial 0-4 Units  0-4 Units SubCUTAneous Nightly Manford Cranker, MD           Review of systems:   Heart: as above   Lungs: as above   Eyes: denies changes in vision or discharge. Ears: denies changes in hearing or pain. Nose: denies epistaxis or masses   Throat: denies sore throat or trouble swallowing. Neuro: denies numbness, tingling, tremors. Skin: denies rashes or itching.    : denies hematuria, dysuria   GI: denies vomiting, diarrhea   Psych: denies mood changed, anxiety, depression. Physical Exam   BP (!) 182/78   Pulse 67   Temp 98.2 °F (36.8 °C) (Oral)   Resp 18   Ht 5' 10.5\" (1.791 m)   Wt 190 lb (86.2 kg)   SpO2 97%   BMI 26.88 kg/m²   Constitutional: Oriented to person, place, and time. No distress. Well developed. Head: Normocephalic and atraumatic. Neck: Neck supple. No hepatojugular reflux. No JVD present. Carotid bruit is not present. No tracheal deviation present. No thyromegaly present. Cardiovascular: Normal rate, regular rhythm, normal heart sounds. intact distal pulses. No gallop and no friction rub. No murmur heard. Pulmonary: Breath sounds normal. No respiratory distress. No wheezes. No rales. Chest: Effort normal. No tenderness. Abdominal: Soft. Bowel sounds are normal. No distension or mass. No tenderness, rebound or guarding. Musculoskeletal: . No tenderness. No clubbing or cyanosis. Extremitites: Intact distal pulses. No edema  Neurological: Alert and oriented to person, place, and time. Skin: Skin is warm and dry. No rash noted. Not diaphoretic. No erythema. Psychiatric: Normal mood and affect. Behavior is normal.   Lymphadenopathy: No cervical adenopathy. No groin adenopathy.     CBC:   Lab Results   Component Value Date/Time    WBC 6.8 01/08/2023 01:07 AM    RBC 3.75 01/08/2023 01:07 AM    HGB 12.3 01/08/2023 01:07 AM    HCT 35.6 01/08/2023 01:07 AM    MCV 94.9 01/08/2023 01:07 AM    MCH 32.8 01/08/2023 01:07 AM    MCHC 34.6 01/08/2023 01:07 AM    RDW 13.3 01/08/2023 01:07 AM     01/08/2023 01:07 AM    MPV 10.1 01/08/2023 01:07 AM     BMP:   Lab Results   Component Value Date/Time     01/08/2023 01:07 AM    K 4.0 01/08/2023 01:07 AM     01/08/2023 01:07 AM    CO2 27 01/08/2023 01:07 AM    BUN 25 01/08/2023 01:07 AM    LABALBU 3.4 12/11/2022 01:15 PM    CREATININE 1.3 01/08/2023 01:07 AM    CALCIUM 8.9 01/08/2023 01:07 AM    GFRAA >60 08/29/2022 11:45 AM    LABGLOM 55 01/08/2023 01:07 AM     Magnesium:    Lab Results   Component Value Date/Time    MG 2.0 08/29/2022 11:45 AM     Cardiac Enzymes:   Lab Results   Component Value Date    CKTOTAL 104 04/17/2015    CKTOTAL 111 04/17/2015    CKTOTAL 114 04/17/2015    CKMB 4.3 04/17/2015    CKMB 4.4 04/17/2015    CKMB 4.7 04/17/2015    TROPHS 41 (H) 01/08/2023    TROPHS 45 (H) 01/07/2023    TROPHS 43 (H) 12/11/2022      PT/INR:    Lab Results   Component Value Date/Time    PROTIME 13.3 01/07/2023 06:23 PM    INR 1.2 01/07/2023 06:23 PM     TSH:    Lab Results   Component Value Date/Time    TSH 1.560 02/01/2014 06:20 AM     Rhythm Strip: normal sinus rhythm. ASSESSMENT & PLAN:    Patient Active Problem List   Diagnosis    COPD exacerbation ([address]    Chest pain    Hypertension    Coronary artery disease    Diabetes mellitus type 2, controlled ([address]    Hyperlipidemia    BPH (benign prostatic hyperplasia)    Hyperkalemia    Blind right eye    CKD (chronic kidney disease) stage 3, GFR 30-59 ml/min (HCC)    CAD (coronary artery disease)    Acute decompensated heart failure (HCC)    Chronic respiratory failure (HCC)    EMELY (acute kidney injury) ([address]    SBO (small bowel obstruction) ([address]    Cerebrovascular accident (CVA) ([address]    Hypernatremia    Uncontrolled type 2 diabetes mellitus with hyperglycemia (HCC)    Thrombocytopenia (HCC)    Essential hypertension    Hypertensive urgency    AV block, Mobitz 1    Acute on chronic heart failure with preserved ejection fraction ([address]     1. Rhythm issues:     Blocked PACs versus Mobitz type I AV Block. BB stopped. Clonidine held. Monitor while here. Monitor after discharge. 2. Chronically elevated troponin/CAD-CABG:    CABG x 4 in 1994 and PCI in 2008 -- both at [facility] in [place], further details unknown. Non-ischemic Lexiscan stress test 2/2021. Medically manage. ASA/plavix/statin. 3. Acute on chronic HFpEF:     PO lasix/hydralazine. No BB as above.  Intolerant to aldactone in past.     4. HTN/Hypertensive urgency:     Titrate hydralazine and nifedipine. 5. Lipids: Statin. 6. DM: Per primary service. 7. PAD/Carotid disease    8. CKD: Follow labs. 9. Anemia: Follow labs. 10. COPD/Chronic respiratory failure: On O2.    11. TIA: Statin/ASA/plavix. 15. Former tobacco and alcohol abuse    Nila Zavala D.O.   Cardiologist  Cardiology, [facility]

## 2023-01-09 NOTE — PROGRESS NOTES
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, partially deaf and question whether he can appropriately care for himself there  Pt admitted under similar circumstances recently  Home meds include  Coreg 25 mg bid  Clonidine patch weekly  Hydralazine 25 mg tid  Nifedipine XR 30 mg daily  Cardio input appreciated  Hold Coreg / clonidine  Continue nifedipine / hydralazine, can up-titrate  Lasix x1 yesterday    IDDM - BG a bit high but pt's insulin not seen on home med rec so wasn't ordered; will start insulin regimen and switch diet to diabetic    Please see orders for further plan of care  Code status  Full Code  DVT prophylaxis Lovenox  Disposition  To be determined  --------------------------------------------------------------------------------------    Admission Date  1/7/2023  5:28 PM  Chief Complaint HTN    Subjective  History of Present Illness  Pt seen as he was getting up to work with OT  Pt remains asymptomatic today despite BP creeping back up  Cardio input appreciated - holding Coreg and clonidine but can up-titrate hydralazine and nifedipine  Otherwise no new / active issues  No family present  Discussed w nursing    Review of Systems - 12-point review of systems has been reviewed and is otherwise negative except as listed in the HPI    Objective  Physical Exam  Vitals: BP (!) 182/78   Pulse 67   Temp 98.2 °F (36.8 °C) (Oral)   Resp 18   Ht 5' 10.5\" (1.791 m)   Wt 190 lb (86.2 kg)   SpO2 97%   BMI 26.88 kg/m²   General: well-developed, well-nourished, no acute distress, cooperative  Skin: generally warm, dry, and intact, with normal color  HEENT: normocephalic, atraumatic, no gross abnormalities  Respiratory: clear to auscultation bilaterally without respiratory distress  Cardiovascular: regular rate and rhythm without murmur / rub / gallop  Abdominal: soft, nontender, nondistended, normoactive bowel sounds  Extremities: no obvious edema or deformity  Neurologic: awake, alert, no gross deficits  Psychiatric: normal affect, cooperative    Electronically signed by Henna Merino DO on 1/9/2023 at 1:20 PM

## 2023-01-09 NOTE — PROGRESS NOTES
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]  Hypertensive urgency [I16.0]  Impaired mobility and ADLs [Z74.09, Z78.9]  Cataract of right eye, unspecified cataract type [H26.9]   Surgery: NA     Pertinent Medical History:      Past Medical History:   Diagnosis Date    Blind right eye     CAD (coronary artery disease)      CABG x 4    CHF (congestive heart failure) ([address]     Chronic respiratory failure ([address] 2014    CKD (chronic kidney disease) stage 3, GFR 30-59 ml/min ([address]     Diabetes mellitus ([address]     Diabetes mellitus type 2, controlled ([address] 2013    HLD (hyperlipidemia)     Hypertension     TIA (transient ischemic attack)          Past Surgical History:   Procedure Laterality Date    CARDIAC SURGERY      CORONARY ARTERY BYPASS GRAFT  1994    x4    ECHO COMPL W DOP COLOR FLOW  2013         ECHO COMPL W DOP COLOR FLOW  2013         EYE SURGERY      HC INSERT PICC CATH, 5/> YRS  2021         LAPAROSCOPY N/A 2021    exploratory laparotomy lysis of adhesions performed by Anh Lua MD at [facility] OR     Precautions:  Fall Risk, blind R eye, low vision L eye, [place], home O2 user    Assessment of current deficits    [x] Functional mobility  [x]ADLs  [x] Strength               []Cognition    [x] Functional transfers   [x] IADLs         [x] Safety Awareness   [x]Endurance    [] Fine Coordination              [x] Balance      [x] Vision/perception   [x]Sensation     []Gross Motor Coordination  [] ROM  [] Delirium [] Motor Control     OT PLAN OF CARE   OT POC based on physician orders, patient diagnosis and results of clinical assessment    Frequency/Duration 1-3 days/wk for 2 weeks PRN   Specific OT Treatment Interventions to include:   * Instruction/training on adapted ADL techniques and AE recommendations to increase functional independence within precautions       * Training on energy conservation strategies, correct breathing pattern and techniques to improve independence/tolerance for self-care routine  * Functional transfer/mobility training/DME recommendations for increased independence, safety, and fall prevention  * Patient/Family education to increase follow through with safety techniques and functional independence  * Recommendation of environmental modifications for increased safety with functional transfers/mobility and ADLs  * Therapeutic exercise to improve motor endurance, ROM, and functional strength for ADLs/functional transfers  * Therapeutic activities to facilitate/challenge dynamic balance, stand tolerance for increased safety and independence with ADLs  * Therapeutic activities to facilitate gross/fine motor skills for increased independence with ADLs  * Positioning to improve skin integrity, interaction with environment and functional independence  * Delirium prevention/treatment    Recommended Adaptive Equipment:  TBD    Comments: Based on patient's functional performance as stated below and level of assistance needed prior to admission, this therapist believes that the patient would benefit from further skilled OT following hospital stay in an effort to increase safety, functional independence, and quality of life. Home Living: Pt lives alone (22 y.o. son is a Pharmacist in [place]) in a 2 story home with 3 step(s) to enter and 1 rail(s); bed/bath on second floor with full flight of stairs with 1 rail  Bathroom setup: tub shower with SB, standard commode.  Half bath available on first floor with elevated commode and SB  Equipment owned: none    Prior Level of Function: reports independence with ADLs and assistance with IADLs; using no device for functional mobility. Patient reports his son orders grocery delivery and sets up his meds once a week. Calls him daily. Completes simple meal prep with freezer dinners/microwave. Does not typically use stove/oven d/t visual deficits. Patient reports his sister (now ), used to assist with appointments. Driving: no  Occupation: retired ,     Pain Level: denies pain  Cognition: A&O: 4/4; Follows 3 step directions. Pleasant, cooperative, verbose. Memory: G   Sequencing: G   Problem solving: F   Judgement/safety: F     Functional Assessment: AM-PAC Daily Activity Raw Score:    Initial Eval Status  Date: 23 Treatment Status  Date: STGs = LTGs  Time frame: 10-14 days   Feeding SBA  Reports spillage, difficulties with item location on plate/tray     Mod I   Grooming SBA  simulated    Mod I while standing at sink    UB Dressing SBA  simulated    Set up A   LB Dressing SBA  To doff/nathan socks EOB, SOB noted with task completion- rest breaks taken    Independent/ Mod I   Bathing SBA  simulated    Mod I   Toileting SBA  simulated       Bed Mobility  Rolling: NT  Supine to sit: Supervision  Sit to supine: Supervision     Functional Transfers Sit to stand: Supervision  Stand to sit: Supervision  Independent    Functional Mobility CGA no AE  For in-room distances  Mod I/independent for in-room distances   Balance Sitting:     Static: supervision    Dynamic:supervision  Standing: CGA     Endurance/Activity Tolerance Fair-  Good-   Visual/  Perceptual Glasses: none   -impaired.  Blind R eye, low vision L eye and reports vision decreasing more lately           Hand Dominance R   AROM (PROM) Strength Additional Info:    RUE  WFL 4/5 good  and wfl FMC/dexterity noted during ADL tasks   LUE WFL 4/5 good  and wfl FMC/dexterity noted during ADL tasks     Hearing: mild [place]  Sensation: reports chronic N/T in B hands  Tone: WNL  Edema: unremarkable                            Comments:  Upon arrival patient supine with HOB slightly elevated and bed alarm on. Bed mobility, functional mobility/transfers, LB dressing addressed. Patient demo's SOB with light ADL actitivities, SpO2 within normal limits. Some redirection needed for attention to task. After session, patient long sitting with all devices within reach, all lines and tubes intact. Bed alarm on. Pt required cues and education as noted above for safe facilitation and completion of tasks. Therapist provided skilled monitoring of SpO2, HR, and patient's response during treatment session. Prior to and at the end of session, environmental modifications/line management completed for patients safety and efficiency of treatment session. Overall, patient demonstrates mild to moderate difficulties with completion of BADLs and IADLs. Factors contributing to these difficulties include limited vision, decreased endurance, and generalized weakness. As noted above, patient likely to benefit from further OT intervention to increase independence, safety, and overall quality of life. Discussed with patient recommendations of further supervision/as needed assistance within his home d/t worsening vision and decreased strength. Visual deficits impact all aspects of IADL performance including home management tasks, medication management, bill management, homemaking skills. Limited meal prep abilities lend to purchase of high sodium freezer/microwave meals per patient. Eval Complexity:   Low Complexity    Treatment:   Bed mobility: Facilitated bed mobility with cues for proper body mechanics and sequencing to prepare for ADL completion. Functional transfers: Facilitated transfers from various surfaces with cues for body alignment, safety and hand placement.   ADL completion: Self-care retraining for the above-mentioned ADLs; training on proper hand placement, safety technique, sequencing, and energy conservation techniques. Discussed compensatory strategies for increased independence in feeding and other ADL tasks- limited retention. Delirium Prevention/Management: Implementation of non-pharmacologic interventions for delirium prevention incorporated throughout session to patients. Including environmental and sensory modifications to decrease patient's internal distraction caused by delirium, and improve overall mentation. Rehab Potential:  Good for established goals     Patient / Family Goal: None stated      Patient and/or family were instructed on functional diagnosis, prognosis/goals and OT plan of care. Demonstrated fair understanding. Eval Complexity: Low      Time In:0906  Time Out: 0936  Total Time:30 minutes    Min Units   OT Eval Low 97165  X 1    OT Eval Medium 92880      OT Eval High 34672       OT Re-Eval [id number]       Therapeutic Ex 04792       Therapeutic Activities 00153       ADL/Self Care 69511  15 1    Orthotic Management 49537       Neuro Re-Ed 19990       Non-Billable Time          Evaluation Time additionally includes thorough review of current medical information, gathering information on past medical history/social history and prior level of function, completion of standardized testing/informal observation of tasks, assessment of data and education on plan of care and goals.     Parker Hidden, OTR/L  [id number]

## 2023-01-10 LAB
METER GLUCOSE: 150 MG/DL (ref 74–99)
METER GLUCOSE: 202 MG/DL (ref 74–99)
METER GLUCOSE: 212 MG/DL (ref 74–99)
METER GLUCOSE: 240 MG/DL (ref 74–99)

## 2023-01-10 PROCEDURE — 6360000002 HC RX W HCPCS: Performed by: STUDENT IN AN ORGANIZED HEALTH CARE EDUCATION/TRAINING PROGRAM

## 2023-01-10 PROCEDURE — 1200000000 HC SEMI PRIVATE

## 2023-01-10 PROCEDURE — 6370000000 HC RX 637 (ALT 250 FOR IP): Performed by: STUDENT IN AN ORGANIZED HEALTH CARE EDUCATION/TRAINING PROGRAM

## 2023-01-10 PROCEDURE — 2700000000 HC OXYGEN THERAPY PER DAY

## 2023-01-10 PROCEDURE — 2580000003 HC RX 258: Performed by: STUDENT IN AN ORGANIZED HEALTH CARE EDUCATION/TRAINING PROGRAM

## 2023-01-10 PROCEDURE — 6370000000 HC RX 637 (ALT 250 FOR IP): Performed by: INTERNAL MEDICINE

## 2023-01-10 PROCEDURE — 82962 GLUCOSE BLOOD TEST: CPT

## 2023-01-10 RX ADMIN — BRIMONIDINE TARTRATE 1 DROP: 2 SOLUTION OPHTHALMIC at 16:55

## 2023-01-10 RX ADMIN — HYDRALAZINE HYDROCHLORIDE 50 MG: 50 TABLET ORAL at 13:48

## 2023-01-10 RX ADMIN — HYDRALAZINE HYDROCHLORIDE 20 MG: 20 INJECTION INTRAMUSCULAR; INTRAVENOUS at 09:24

## 2023-01-10 RX ADMIN — FERROUS SULFATE TAB 325 MG (65 MG ELEMENTAL FE) 325 MG: 325 (65 FE) TAB at 16:52

## 2023-01-10 RX ADMIN — SODIUM CHLORIDE, PRESERVATIVE FREE 10 ML: 5 INJECTION INTRAVENOUS at 20:37

## 2023-01-10 RX ADMIN — SERTRALINE HYDROCHLORIDE 75 MG: 50 TABLET ORAL at 08:02

## 2023-01-10 RX ADMIN — BRIMONIDINE TARTRATE 1 DROP: 2 SOLUTION OPHTHALMIC at 08:03

## 2023-01-10 RX ADMIN — INSULIN LISPRO 2 UNITS: 100 INJECTION, SOLUTION INTRAVENOUS; SUBCUTANEOUS at 11:22

## 2023-01-10 RX ADMIN — FERROUS SULFATE TAB 325 MG (65 MG ELEMENTAL FE) 325 MG: 325 (65 FE) TAB at 08:02

## 2023-01-10 RX ADMIN — FUROSEMIDE 20 MG: 20 TABLET ORAL at 20:37

## 2023-01-10 RX ADMIN — TAMSULOSIN HYDROCHLORIDE 0.4 MG: 0.4 CAPSULE ORAL at 16:51

## 2023-01-10 RX ADMIN — HYDRALAZINE HYDROCHLORIDE 50 MG: 50 TABLET ORAL at 20:36

## 2023-01-10 RX ADMIN — LATANOPROST 1 DROP: 50 SOLUTION OPHTHALMIC at 20:36

## 2023-01-10 RX ADMIN — SODIUM CHLORIDE, PRESERVATIVE FREE 10 ML: 5 INJECTION INTRAVENOUS at 08:03

## 2023-01-10 RX ADMIN — PANTOPRAZOLE SODIUM 40 MG: 40 TABLET, DELAYED RELEASE ORAL at 05:58

## 2023-01-10 RX ADMIN — INSULIN LISPRO 2 UNITS: 100 INJECTION, SOLUTION INTRAVENOUS; SUBCUTANEOUS at 16:53

## 2023-01-10 RX ADMIN — HYDRALAZINE HYDROCHLORIDE 50 MG: 50 TABLET ORAL at 08:02

## 2023-01-10 RX ADMIN — ASPIRIN 81 MG: 81 TABLET, CHEWABLE ORAL at 08:02

## 2023-01-10 RX ADMIN — CLOPIDOGREL BISULFATE 75 MG: 75 TABLET ORAL at 08:02

## 2023-01-10 RX ADMIN — FUROSEMIDE 20 MG: 20 TABLET ORAL at 08:02

## 2023-01-10 RX ADMIN — ATORVASTATIN CALCIUM 10 MG: 10 TABLET, FILM COATED ORAL at 08:02

## 2023-01-10 RX ADMIN — ENOXAPARIN SODIUM 40 MG: 100 INJECTION SUBCUTANEOUS at 08:02

## 2023-01-10 RX ADMIN — NIFEDIPINE 90 MG: 30 TABLET, EXTENDED RELEASE ORAL at 08:02

## 2023-01-10 ASSESSMENT — PAIN SCALES - GENERAL: PAINLEVEL_OUTOF10: 0

## 2023-01-10 NOTE — PROGRESS NOTES
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, partially deaf and question whether he can appropriately care for himself there  Pt admitted under similar circumstances recently  Home meds include  Coreg 25 mg bid  Clonidine patch weekly  Hydralazine 25 mg tid  Nifedipine XR 30 mg daily  Cardio input appreciated  Stop Coreg / clonidine  Continue nifedipine / hydralazine, can up-titrate - nifedipine now at 90 mg daily and hydralazine at 50 mg tid w BP now much improved - 134/64 most recently  Lasix x1 yesterday  Consider dc tomorrow if BP remains stable and no new issues    IDDM - BG a bit high but pt's insulin not seen on home med rec so wasn't ordered; will start insulin regimen and switch diet to diabetic    Please see orders for further plan of care  Code status  Full Code  DVT prophylaxis Lovenox  Disposition  To be determined  --------------------------------------------------------------------------------------    Admission Date  1/7/2023  5:28 PM  Chief Complaint HTN    Subjective  History of Present Illness  Pt seen, lying in bed  Initially sleeping, woke easily  Feels well, pleasant and in good spirits  Does not c/o any chest or head tightness today despite BP back up a bit  Earlier this AM was 212/84 confirmed w multiple repeat measurements  Adjustments to nifedipine and clonidine made per cardio  Most recent BP now 134/64 which is much better  Would keep overnight again and re-eval in AM but feel if acceptable at that time then pt may be dc'd  No new issues  No family present  Discussed w nursing    Review of Systems - 12-point review of systems has been reviewed and is otherwise negative except as listed in the HPI    Objective  Physical Exam  Vitals: /64   Pulse 78   Temp 98.8 °F (37.1 °C) (Oral)   Resp 18   Ht 5' 10.5\" (1.791 m)   Wt 214 lb 1.1 oz (97.1 kg)   SpO2 96%   BMI 30.28 kg/m²   General: well-developed, well-nourished, no acute distress, cooperative  Skin: generally warm, dry, and intact, with normal color  HEENT: normocephalic, atraumatic, no gross abnormalities  Respiratory: clear to auscultation bilaterally without respiratory distress  Cardiovascular: regular rate and rhythm without murmur / rub / gallop  Abdominal: soft, nontender, nondistended, normoactive bowel sounds  Extremities: no obvious edema or deformity  Neurologic: awake, alert, no gross deficits  Psychiatric: normal affect, cooperative    Electronically signed by Citlali Simpson DO on 1/10/2023 at 2:09 PM

## 2023-01-10 NOTE — PROGRESS NOTES
[facility] Cardiology Inpatient Progress Note    Patient is a 81 y.o. male of [place], [place] seen in hospital follow up. Chief complaint: Irregular heartbeat/HTN    HPI: No CP or SOB. PAST MEDICAL HISTORY:    CAD s/p CABG x 4 at [place] in [place] in 1994, further details unknown  Inferior wall myocardial infarction. 1994 status post aortocoronary bypass grafting x4 ([facility] in [place], further details unknown at this time). November 13, 2007, adenosine nuclear stress test:  Normal examination with no evidence of left ventricular myocardium at risk for stress-induced ischemia with an ejection fraction of 46%. November 10, 2008, 2-D echocardiogram showed moderate concentric left ventricular hypertrophy. Ejection fraction visually estimated at 60%. Values were not well-visualized. (As interpreted by Dr. Joslyn Blood.)  May 24, 2013, [facility] nuclear stress study:  There is an area of relative hypokinesia of the LV myocardial contraction in the inferior wall toward the basal region where diminished radionuclide activity is seen and the post pharmacological stress suspicious for an area of pharmacological-induced ischemia. Planned for medical management with nitrate added at that time. LHC and st[facility] 2018 ([facility] [place] per patient - records not available. Most recent ischemic evaluation -- Lexiscan stress test 2/2021: Fixed inferolateral defect. No reversible ischemia. LVEF 64%. However, visually the LV appears severely dilated with moderate global hypokinesis and severe hypokinesis of the inferolateral wall. May 23, 2013, 2-D echocardiogram (as interpreted by Dr. Joslyn Blood): Normal left ventricle size, mild concentric left ventricular hypertrophy. Ejection fraction visually estimated at 55%. Stage 2 diastolic dysfunction. September 22, 2014, 2-D echocardiogram (as interpreted by Dr. Joslyn Blood): Normal left ventricle size. Moderate concentric left ventricular hypertrophy.   Ejection fraction visually estimated at 70%. Most recent TTE, 3/2021 Dr. Lj Min: Normal LV size and function, EF 65±5%. Borderline septal hypokinesis. Stage I DD. Mildly dilated RV, TAPSE normal.  Moderately dilated LA. Moderate MAC.   Chronic HFpEF   PAD  Carotid artery disease, follows with Vascular surgery  Chronic RBBB  HTN  HLD, on statin therapy  Insulin requiring T2DM  CKD, baseline Cr 1.3  Chronic iron deficiency anemia  COPD with chronic respiratory failure, on home O2  Former tobacco and alcohol abuse  Osteoarthritis  Osteoporosis  Glaucoma: Status post surgery to left eye with cataract removal. 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  2007, status post most recent surgery to right eye  History of TIA (further details unknown)  ALDACTONE intolerance (hyperkalemia reaction)    Patient Active Problem List   Diagnosis    COPD exacerbation ([address]    Chest pain    Hypertension    Coronary artery disease    Diabetes mellitus type 2, controlled ([address]    Hyperlipidemia    BPH (benign prostatic hyperplasia)    Hyperkalemia    Blind right eye    CKD (chronic kidney disease) stage 3, GFR 30-59 ml/min (HCC)    CAD (coronary artery disease)    Acute decompensated heart failure (HCC)    Chronic respiratory failure (HCC)    EMELY (acute kidney injury) ([address]    SBO (small bowel obstruction) ([address]    Cerebrovascular accident (CVA) ([address]    Hypernatremia    Uncontrolled type 2 diabetes mellitus with hyperglycemia (HCC)    Thrombocytopenia ([address]    Essential hypertension    Hypertensive urgency    AV block, Mobitz 1    Acute on chronic heart failure with preserved ejection fraction (HCC)       Allergies   Allergen Reactions    Dorzolamide Swelling    Seasonal      Other reaction(s): SWELLING-LOWER EYE LIDS    Spironolactone Other (See Comments)     Hyperkalemia--4/16/15  hyperkalemia  Hyperkalemia--4/16/15       Current Facility-Administered Medications   Medication Dose Route Frequency Provider Last Rate Last Admin    NIFEdipine (ADALAT CC) extended release tablet 90 mg  90 mg Oral Daily Clemetine Blunt, DO   90 mg at 01/10/23 0159    hydrALAZINE (APRESOLINE) tablet 50 mg  50 mg Oral TID Clemetine Blunt, DO   50 mg at 01/10/23 0802    aspirin chewable tablet 81 mg  81 mg Oral Daily Gilbert Chapin MD   81 mg at 01/10/23 0802    brimonidine (ALPHAGAN) 0.2 % ophthalmic solution 1 drop  1 drop Left Eye BID Gilbert Chapin MD   1 drop at 01/10/23 0803    [Held by provider] cloNIDine (CATAPRES) tablet 0.05 mg  0.05 mg Oral BID Gilbert Chapin MD        clopidogrel (PLAVIX) tablet 75 mg  75 mg Oral Daily Gilbert Chapin MD   75 mg at 01/10/23 0802    docusate sodium (COLACE) capsule 100 mg  100 mg Oral Daily PRN Gilbert Chapin MD        ferrous sulfate (IRON 325) tablet 325 mg  325 mg Oral BID WC Gilbert Chapin MD   325 mg at 01/10/23 0802    furosemide (LASIX) tablet 20 mg  20 mg Oral BID Gilbert Chapin MD   20 mg at 01/10/23 0802    ipratropium-albuterol (DUONEB) nebulizer solution 3 mL  3 mL Inhalation Q6H PRN Gilbert Chapin MD        latanoprost (XALATAN) 0.005 % ophthalmic solution 1 drop  1 drop Left Eye Nightly Gilbert Chapin MD   1 drop at 01/09/23 2047    nitroGLYCERIN (NITROSTAT) SL tablet 0.4 mg  0.4 mg SubLINGual Q5 Min PRN Gilbert Chapin MD        pantoprazole (PROTONIX) tablet 40 mg  40 mg Oral QAM AC Gilbert Chapin MD   40 mg at 01/10/23 0558    sertraline (ZOLOFT) tablet 75 mg  75 mg Oral Daily Gilbert Chapin MD   75 mg at 01/10/23 0802    simethicone (MYLICON) chewable tablet 80 mg  80 mg Oral Q6H PRN Gilbert Chapin MD        atorvastatin (LIPITOR) tablet 10 mg  10 mg Oral Daily Gilbert Chaipn MD   10 mg at 01/10/23 0802    tamsulosin (FLOMAX) capsule 0.4 mg  0.4 mg Oral QPM Gilbert Chapin MD   0.4 mg at 01/09/23 1741    sodium chloride flush 0.9 % injection 5-40 mL  5-40 mL IntraVENous 2 times per day Gilbert Chapin MD   10 mL at 01/10/23 0803    sodium chloride flush 0.9 % injection 5-40 mL 5-40 mL IntraVENous PRN Lisa Correa MD   10 mL at 01/08/23 0120    0.9 % sodium chloride infusion   IntraVENous PRN Lisa Correa MD        enoxaparin (LOVENOX) injection 40 mg  40 mg SubCUTAneous Daily Lisa Correa MD   40 mg at 01/10/23 0802    ondansetron (ZOFRAN-ODT) disintegrating tablet 4 mg  4 mg Oral Q8H PRN Lisa Correa MD        Or    ondansetron [facility]) injection 4 mg  4 mg IntraVENous Q6H PRN Lisa Correa MD        polyethylene glycol (GLYCOLAX) packet 17 g  17 g Oral Daily PRN Lisa Correa MD        acetaminophen (TYLENOL) tablet 650 mg  650 mg Oral Q6H PRN Lisa Correa MD        Or    acetaminophen (TYLENOL) suppository 650 mg  650 mg Rectal Q6H PRN Lisa Correa MD        hydrALAZINE (APRESOLINE) injection 20 mg  20 mg IntraVENous Q6H PRN Lisa Correa MD   20 mg at 01/09/23 0539    glucose chewable tablet 16 g  4 tablet Oral PRN Lisa Correa MD        dextrose bolus 10% 125 mL  125 mL IntraVENous PRN Lisa Correa MD        Or    dextrose bolus 10% 250 mL  250 mL IntraVENous PRN Lisa Correa MD        glucagon (rDNA) injection 1 mg  1 mg SubCUTAneous PRN Lisa Correa MD        dextrose 10 % infusion   IntraVENous Continuous PRN Lisa Correa MD        insulin lispro (HUMALOG) injection vial 0-8 Units  0-8 Units SubCUTAneous TID WC Lisa Correa MD   2 Units at 01/09/23 1741    insulin lispro (HUMALOG) injection vial 0-4 Units  0-4 Units SubCUTAneous Nightly Lisa Correa MD           Review of systems:   Heart: as above   Lungs: as above   Eyes: denies changes in vision or discharge. Ears: denies changes in hearing or pain. Nose: denies epistaxis or masses   Throat: denies sore throat or trouble swallowing. Neuro: denies numbness, tingling, tremors. Skin: denies rashes or itching. : denies hematuria, dysuria   GI: denies vomiting, diarrhea   Psych: denies mood changed, anxiety, depression.     Physical Exam   BP (!) 212/84 Comment: RN notified; BP checked x 4 and both arms  Pulse 83   Temp 98.8 °F (37.1 °C) (Oral)   Resp 18   Ht 5' 10.5\" (1.791 m)   Wt 214 lb 1.1 oz (97.1 kg)   SpO2 96%   BMI 30.28 kg/m²   Constitutional: Oriented to person, place, and time. No distress. Well developed. Head: Normocephalic and atraumatic. Neck: Neck supple. No hepatojugular reflux. No JVD present. Carotid bruit is not present. No tracheal deviation present. No thyromegaly present. Cardiovascular: Normal rate, regular rhythm, normal heart sounds. intact distal pulses. No gallop and no friction rub. No murmur heard. Pulmonary: Breath sounds normal. No respiratory distress. No wheezes. No rales. Chest: Effort normal. No tenderness. Abdominal: Soft. Bowel sounds are normal. No distension or mass. No tenderness, rebound or guarding. Musculoskeletal: . No tenderness. No clubbing or cyanosis. Extremitites: Intact distal pulses. No edema  Neurological: Alert and oriented to person, place, and time. Skin: Skin is warm and dry. No rash noted. Not diaphoretic. No erythema. Psychiatric: Normal mood and affect. Behavior is normal.   Lymphadenopathy: No cervical adenopathy. No groin adenopathy.     CBC:   Lab Results   Component Value Date/Time    WBC 6.8 01/08/2023 01:07 AM    RBC 3.75 01/08/2023 01:07 AM    HGB 12.3 01/08/2023 01:07 AM    HCT 35.6 01/08/2023 01:07 AM    MCV 94.9 01/08/2023 01:07 AM    MCH 32.8 01/08/2023 01:07 AM    MCHC 34.6 01/08/2023 01:07 AM    RDW 13.3 01/08/2023 01:07 AM     01/08/2023 01:07 AM    MPV 10.1 01/08/2023 01:07 AM     BMP:   Lab Results   Component Value Date/Time     01/08/2023 01:07 AM    K 4.0 01/08/2023 01:07 AM     01/08/2023 01:07 AM    CO2 27 01/08/2023 01:07 AM    BUN 25 01/08/2023 01:07 AM    LABALBU 3.4 12/11/2022 01:15 PM    CREATININE 1.3 01/08/2023 01:07 AM    CALCIUM 8.9 01/08/2023 01:07 AM    GFRAA >60 08/29/2022 11:45 AM    LABGLOM 55 01/08/2023 01:07 AM     Magnesium:    Lab Results Component Value Date/Time    MG 2.0 08/29/2022 11:45 AM     Cardiac Enzymes:   Lab Results   Component Value Date    CKTOTAL 104 04/17/2015    CKTOTAL 111 04/17/2015    CKTOTAL 114 04/17/2015    CKMB 4.3 04/17/2015    CKMB 4.4 04/17/2015    CKMB 4.7 04/17/2015    TROPHS 41 (H) 01/08/2023    TROPHS 45 (H) 01/07/2023    TROPHS 43 (H) 12/11/2022      PT/INR:    Lab Results   Component Value Date/Time    PROTIME 13.3 01/07/2023 06:23 PM    INR 1.2 01/07/2023 06:23 PM     TSH:    Lab Results   Component Value Date/Time    TSH 1.560 02/01/2014 06:20 AM     Rhythm Strip: normal sinus rhythm. ASSESSMENT & PLAN:    Patient Active Problem List   Diagnosis    COPD exacerbation ([address]    Chest pain    Hypertension    Coronary artery disease    Diabetes mellitus type 2, controlled ([address]    Hyperlipidemia    BPH (benign prostatic hyperplasia)    Hyperkalemia    Blind right eye    CKD (chronic kidney disease) stage 3, GFR 30-59 ml/min (HCC)    CAD (coronary artery disease)    Acute decompensated heart failure (HCC)    Chronic respiratory failure (HCC)    EMELY (acute kidney injury) ([address]    SBO (small bowel obstruction) ([address]    Cerebrovascular accident (CVA) ([address]    Hypernatremia    Uncontrolled type 2 diabetes mellitus with hyperglycemia (HCC)    Thrombocytopenia (HCC)    Essential hypertension    Hypertensive urgency    AV block, Mobitz 1    Acute on chronic heart failure with preserved ejection fraction ([address]     1. Rhythm issues:     Blocked PACs versus Mobitz type I AV Block. BB stopped. Clonidine held. Monitor while here. Monitor after discharge. 2. Chronically elevated troponin/CAD-CABG:    CABG x 4 in 1994 and PCI in 2008 -- both at [place] in [place], further details unknown. Non-ischemic Lexiscan stress test 2/2021. Medically manage. ASA/plavix/statin. 3. Acute on chronic HFpEF:     PO lasix/hydralazine. No BB as above.  Intolerant to aldactone in past.     4. HTN/Hypertensive urgency: Titrate hydralazine and nifedipine. 5. Lipids: Statin. 6. DM: Per primary service. 7. PAD/Carotid disease    8. CKD: Follow labs. 9. Anemia: Follow labs. 10. COPD/Chronic respiratory failure: On O2.    11. TIA: Statin/ASA/plavix. 15. Former tobacco and alcohol abuse    Bri Solomon D.O.   Cardiologist  Cardiology, [address]

## 2023-01-11 LAB
ANION GAP SERPL CALCULATED.3IONS-SCNC: 3 MMOL/L (ref 7–16)
BUN BLDV-MCNC: 19 MG/DL (ref 6–23)
CALCIUM SERPL-MCNC: 8.3 MG/DL (ref 8.6–10.2)
CHLORIDE BLD-SCNC: 101 MMOL/L (ref 98–107)
CO2: 31 MMOL/L (ref 22–29)
CREAT SERPL-MCNC: 1.2 MG/DL (ref 0.7–1.2)
GFR SERPL CREATININE-BSD FRML MDRD: >60 ML/MIN/1.73
GLUCOSE BLD-MCNC: 263 MG/DL (ref 74–99)
HCT VFR BLD CALC: 35.4 % (ref 37–54)
HEMOGLOBIN: 11.8 G/DL (ref 12.5–16.5)
MCH RBC QN AUTO: 31.8 PG (ref 26–35)
MCHC RBC AUTO-ENTMCNC: 33.3 % (ref 32–34.5)
MCV RBC AUTO: 95.4 FL (ref 80–99.9)
METER GLUCOSE: 140 MG/DL (ref 74–99)
METER GLUCOSE: 207 MG/DL (ref 74–99)
METER GLUCOSE: 219 MG/DL (ref 74–99)
METER GLUCOSE: 265 MG/DL (ref 74–99)
PDW BLD-RTO: 13.2 FL (ref 11.5–15)
PLATELET # BLD: 129 E9/L (ref 130–450)
PMV BLD AUTO: 9.7 FL (ref 7–12)
POTASSIUM SERPL-SCNC: 3.9 MMOL/L (ref 3.5–5)
RBC # BLD: 3.71 E12/L (ref 3.8–5.8)
SODIUM BLD-SCNC: 135 MMOL/L (ref 132–146)
WBC # BLD: 6.5 E9/L (ref 4.5–11.5)

## 2023-01-11 PROCEDURE — 97530 THERAPEUTIC ACTIVITIES: CPT

## 2023-01-11 PROCEDURE — 2580000003 HC RX 258: Performed by: STUDENT IN AN ORGANIZED HEALTH CARE EDUCATION/TRAINING PROGRAM

## 2023-01-11 PROCEDURE — 82962 GLUCOSE BLOOD TEST: CPT

## 2023-01-11 PROCEDURE — 6360000002 HC RX W HCPCS: Performed by: STUDENT IN AN ORGANIZED HEALTH CARE EDUCATION/TRAINING PROGRAM

## 2023-01-11 PROCEDURE — 6370000000 HC RX 637 (ALT 250 FOR IP): Performed by: STUDENT IN AN ORGANIZED HEALTH CARE EDUCATION/TRAINING PROGRAM

## 2023-01-11 PROCEDURE — 1200000000 HC SEMI PRIVATE

## 2023-01-11 PROCEDURE — 6370000000 HC RX 637 (ALT 250 FOR IP): Performed by: INTERNAL MEDICINE

## 2023-01-11 PROCEDURE — 2700000000 HC OXYGEN THERAPY PER DAY

## 2023-01-11 PROCEDURE — 80048 BASIC METABOLIC PNL TOTAL CA: CPT

## 2023-01-11 PROCEDURE — 36415 COLL VENOUS BLD VENIPUNCTURE: CPT

## 2023-01-11 PROCEDURE — 85027 COMPLETE CBC AUTOMATED: CPT

## 2023-01-11 RX ORDER — LISINOPRIL 20 MG/1
20 TABLET ORAL DAILY
Status: DISCONTINUED | OUTPATIENT
Start: 2023-01-11 | End: 2023-01-12 | Stop reason: HOSPADM

## 2023-01-11 RX ADMIN — HYDRALAZINE HYDROCHLORIDE 20 MG: 20 INJECTION INTRAMUSCULAR; INTRAVENOUS at 09:43

## 2023-01-11 RX ADMIN — BRIMONIDINE TARTRATE 1 DROP: 2 SOLUTION OPHTHALMIC at 07:44

## 2023-01-11 RX ADMIN — CLOPIDOGREL BISULFATE 75 MG: 75 TABLET ORAL at 07:43

## 2023-01-11 RX ADMIN — FERROUS SULFATE TAB 325 MG (65 MG ELEMENTAL FE) 325 MG: 325 (65 FE) TAB at 17:14

## 2023-01-11 RX ADMIN — NIFEDIPINE 90 MG: 30 TABLET, EXTENDED RELEASE ORAL at 07:44

## 2023-01-11 RX ADMIN — TAMSULOSIN HYDROCHLORIDE 0.4 MG: 0.4 CAPSULE ORAL at 17:14

## 2023-01-11 RX ADMIN — SODIUM CHLORIDE, PRESERVATIVE FREE 10 ML: 5 INJECTION INTRAVENOUS at 19:49

## 2023-01-11 RX ADMIN — ENOXAPARIN SODIUM 40 MG: 100 INJECTION SUBCUTANEOUS at 07:43

## 2023-01-11 RX ADMIN — INSULIN LISPRO 2 UNITS: 100 INJECTION, SOLUTION INTRAVENOUS; SUBCUTANEOUS at 12:02

## 2023-01-11 RX ADMIN — ATORVASTATIN CALCIUM 10 MG: 10 TABLET, FILM COATED ORAL at 07:43

## 2023-01-11 RX ADMIN — PANTOPRAZOLE SODIUM 40 MG: 40 TABLET, DELAYED RELEASE ORAL at 05:00

## 2023-01-11 RX ADMIN — FUROSEMIDE 20 MG: 20 TABLET ORAL at 07:43

## 2023-01-11 RX ADMIN — HYDRALAZINE HYDROCHLORIDE 50 MG: 50 TABLET ORAL at 19:45

## 2023-01-11 RX ADMIN — ASPIRIN 81 MG: 81 TABLET, CHEWABLE ORAL at 07:43

## 2023-01-11 RX ADMIN — FUROSEMIDE 20 MG: 20 TABLET ORAL at 19:45

## 2023-01-11 RX ADMIN — LATANOPROST 1 DROP: 50 SOLUTION OPHTHALMIC at 19:48

## 2023-01-11 RX ADMIN — LISINOPRIL 20 MG: 20 TABLET ORAL at 14:17

## 2023-01-11 RX ADMIN — FERROUS SULFATE TAB 325 MG (65 MG ELEMENTAL FE) 325 MG: 325 (65 FE) TAB at 07:44

## 2023-01-11 RX ADMIN — HYDRALAZINE HYDROCHLORIDE 20 MG: 20 INJECTION INTRAMUSCULAR; INTRAVENOUS at 19:00

## 2023-01-11 RX ADMIN — INSULIN LISPRO 2 UNITS: 100 INJECTION, SOLUTION INTRAVENOUS; SUBCUTANEOUS at 17:15

## 2023-01-11 RX ADMIN — BRIMONIDINE TARTRATE 1 DROP: 2 SOLUTION OPHTHALMIC at 17:14

## 2023-01-11 RX ADMIN — HYDRALAZINE HYDROCHLORIDE 50 MG: 50 TABLET ORAL at 14:17

## 2023-01-11 RX ADMIN — HYDRALAZINE HYDROCHLORIDE 50 MG: 50 TABLET ORAL at 07:43

## 2023-01-11 RX ADMIN — SERTRALINE HYDROCHLORIDE 75 MG: 50 TABLET ORAL at 07:43

## 2023-01-11 RX ADMIN — SODIUM CHLORIDE, PRESERVATIVE FREE 10 ML: 5 INJECTION INTRAVENOUS at 07:44

## 2023-01-11 ASSESSMENT — PAIN SCALES - GENERAL: PAINLEVEL_OUTOF10: 0

## 2023-01-11 NOTE — PROGRESS NOTES
Physical Therapy  Facility/Department: [facility]S INTERMEDIATE  Daily Treatment Note  NAME: Cherie Atkins  : 1940  MRN: [id number]    Date of Service: 2023    Patient Diagnosis(es): The primary encounter diagnosis was Essential hypertension. Diagnoses of Impaired mobility and ADLs, Cataract of right eye, unspecified cataract type, and AV block, Mobitz 1 were also pertinent to this visit. 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 There are 12 steps and 1 rail to 2nd floor bed and bath. Pt ambulated with no AD PTA. OBJECTIVE:    Initial Evaluation  Date: 23 Treatment   Short Term/ Long Term   Goals   Was pt agreeable to Eval/treatment? Yes  yes     Does pt have pain? None  none reported     Bed Mobility  Rolling: Indep  Supine to sit: Indep  Sit to supine: Indep  Scooting: Indep  independent     Transfers Sit to stand: S/Indep  Stand to sit: Indep  Stand pivot: Indep  Supervision  independent   Ambulation   30 feet with no AD indep  60 feet with no device Min A. Assist required for direction changes due to poor vision. 200 feet with no AD indep   Stair negotiation: ascended and descended  NA-predict indep based on mobility  NT 3-4 steps with 1 rail Indep   ROM [facility]       MMT [facility]       AM-PAC 6 Clicks         Vitals:  SPO2 after ambulation was 89% but quickly increased to 94% on 1L. Patient education  Pt educated on PT objectives during treatment session, safety around the room.     Patient response to education:   Pt verbalized understanding Pt demonstrated skill Pt requires further education in this area   yes With cueing yes ASSESSMENT:    Comments:  Pt found and left in bed with call light in reach and bed alarm on. Treatment:  Patient practiced and was instructed in the following treatment:   Functional mobility performed as documented above. No report of dizziness during functional mobility. Pt with unsteady gait but pt with poor vision and required Min A for direction changes around his room. PLAN:    Patient is making good progress towards established goals. Will continue with current POC.      Time in  1030  Time out  1038    Total Treatment Time  8 minutes     CPT codes:    [x] Therapeutic activities 45592 8 minutes  [] Therapeutic exercises 28572  minutes      Trinidad Johns, [phone]

## 2023-01-11 NOTE — PROGRESS NOTES
[facility] Cardiology Inpatient Progress Note    Patient is a 81 y.o. male of [place], [place] seen in hospital follow up. Chief complaint: Irregular heartbeat/HTN    HPI: No CP or SOB. PAST MEDICAL HISTORY:    CAD s/p CABG x 4 at [facility] in [place] in 1994, further details unknown  Inferior wall myocardial infarction. 1994 status post aortocoronary bypass grafting x4 ([facility] in [place], further details unknown at this time). November 13, 2007, adenosine nuclear stress test:  Normal examination with no evidence of left ventricular myocardium at risk for stress-induced ischemia with an ejection fraction of 46%. November 10, 2008, 2-D echocardiogram showed moderate concentric left ventricular hypertrophy. Ejection fraction visually estimated at 60%. Values were not well-visualized. (As interpreted by Dr. Scott Jacob.)  May 24, 2013, Monica Gula nuclear stress study:  There is an area of relative hypokinesia of the LV myocardial contraction in the inferior wall toward the basal region where diminished radionuclide activity is seen and the post pharmacological stress suspicious for an area of pharmacological-induced ischemia. Planned for medical management with nitrate added at that time. LHC and st[facility] 2018 [facility] [place] per patient - records not available. Most recent ischemic evaluation -- Lexiscan stress test 2/2021: Fixed inferolateral defect. No reversible ischemia. LVEF 64%. However, visually the LV appears severely dilated with moderate global hypokinesis and severe hypokinesis of the inferolateral wall. May 23, 2013, 2-D echocardiogram (as interpreted by Dr. Scott Jacob): Normal left ventricle size, mild concentric left ventricular hypertrophy. Ejection fraction visually estimated at 55%. Stage 2 diastolic dysfunction. September 22, 2014, 2-D echocardiogram (as interpreted by Dr. Scott Jacob): Normal left ventricle size. Moderate concentric left ventricular hypertrophy.   Ejection fraction visually estimated at 70%. Most recent TTE, 3/2021 Dr. Tyler Sweeney: Normal LV size and function, EF 65±5%. Borderline septal hypokinesis. Stage I DD. Mildly dilated RV, TAPSE normal.  Moderately dilated LA. Moderate MAC.   Chronic HFpEF   PAD  Carotid artery disease, follows with Vascular surgery  Chronic RBBB  HTN  HLD, on statin therapy  Insulin requiring T2DM  CKD, baseline Cr 1.3  Chronic iron deficiency anemia  COPD with chronic respiratory failure, on home O2  Former tobacco and alcohol abuse  Osteoarthritis  Osteoporosis  Glaucoma: Status post surgery to left eye with cataract removal. 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  2007, status post most recent surgery to right eye  History of TIA (further details unknown)  ALDACTONE intolerance (hyperkalemia reaction)    Patient Active Problem List   Diagnosis    COPD exacerbation ([address]    Chest pain    Hypertension    Coronary artery disease    Diabetes mellitus type 2, controlled ([address]    Hyperlipidemia    BPH (benign prostatic hyperplasia)    Hyperkalemia    Blind right eye    CKD (chronic kidney disease) stage 3, GFR 30-59 ml/min (HCC)    CAD (coronary artery disease)    Acute decompensated heart failure (HCC)    Chronic respiratory failure (HCC)    EMELY (acute kidney injury) ([address]    SBO (small bowel obstruction) ([address]    Cerebrovascular accident (CVA) ([address]    Hypernatremia    Uncontrolled type 2 diabetes mellitus with hyperglycemia (HCC)    Thrombocytopenia ([address]    Essential hypertension    Hypertensive urgency    AV block, Mobitz 1    Acute on chronic heart failure with preserved ejection fraction (HCC)       Allergies   Allergen Reactions    Dorzolamide Swelling    Seasonal      Other reaction(s): SWELLING-LOWER EYE LIDS    Spironolactone Other (See Comments)     Hyperkalemia--4/16/15  hyperkalemia  Hyperkalemia--4/16/15       Current Facility-Administered Medications   Medication Dose Route Frequency Provider Last Rate Last Admin    NIFEdipine (ADALAT CC) extended release tablet 90 mg  90 mg Oral Daily Renate Elks, DO   90 mg at 01/11/23 0744    hydrALAZINE (APRESOLINE) tablet 50 mg  50 mg Oral TID Renate Elks, DO   50 mg at 01/11/23 5773    aspirin chewable tablet 81 mg  81 mg Oral Daily Sofía Luz MD   81 mg at 01/11/23 0743    brimonidine (ALPHAGAN) 0.2 % ophthalmic solution 1 drop  1 drop Left Eye BID Sofía Luz MD   1 drop at 01/11/23 0744    [Held by provider] cloNIDine (CATAPRES) tablet 0.05 mg  0.05 mg Oral BID Sofía Luz MD        clopidogrel (PLAVIX) tablet 75 mg  75 mg Oral Daily Sofía Luz MD   75 mg at 01/11/23 0743    docusate sodium (COLACE) capsule 100 mg  100 mg Oral Daily PRN Sofía Luz MD        ferrous sulfate (IRON 325) tablet 325 mg  325 mg Oral BID WC Sofía Luz MD   325 mg at 01/11/23 0744    furosemide (LASIX) tablet 20 mg  20 mg Oral BID Sofía Luz MD   20 mg at 01/11/23 0743    ipratropium-albuterol (DUONEB) nebulizer solution 3 mL  3 mL Inhalation Q6H PRN Sofía Luz MD        latanoprost (XALATAN) 0.005 % ophthalmic solution 1 drop  1 drop Left Eye Nightly Sofía Luz MD   1 drop at 01/10/23 2036    nitroGLYCERIN (NITROSTAT) SL tablet 0.4 mg  0.4 mg SubLINGual Q5 Min PRN Sofía Luz MD        pantoprazole (PROTONIX) tablet 40 mg  40 mg Oral QAM AC Sofía Luz MD   40 mg at 01/11/23 0500    sertraline (ZOLOFT) tablet 75 mg  75 mg Oral Daily Sofía Luz MD   75 mg at 01/11/23 0743    simethicone (MYLICON) chewable tablet 80 mg  80 mg Oral Q6H PRN Sofía Luz MD        atorvastatin (LIPITOR) tablet 10 mg  10 mg Oral Daily Sofía Luz MD   10 mg at 01/11/23 0743    tamsulosin (FLOMAX) capsule 0.4 mg  0.4 mg Oral QPM Sofía Lzu MD   0.4 mg at 01/10/23 1651    sodium chloride flush 0.9 % injection 5-40 mL  5-40 mL IntraVENous 2 times per day Sofía Luz MD   10 mL at 01/11/23 0744    sodium chloride flush 0.9 % injection 5-40 mL 5-40 mL IntraVENous PRN Selena Billy MD   10 mL at 01/08/23 0120    0.9 % sodium chloride infusion   IntraVENous PRN Selena Billy MD        enoxaparin (LOVENOX) injection 40 mg  40 mg SubCUTAneous Daily Selena Billy MD   40 mg at 01/11/23 0743    ondansetron (ZOFRAN-ODT) disintegrating tablet 4 mg  4 mg Oral Q8H PRN Selena Billy MD        Or    ondansetron [facility]) injection 4 mg  4 mg IntraVENous Q6H PRN Selena Billy MD        polyethylene glycol (GLYCOLAX) packet 17 g  17 g Oral Daily PRN Selena Billy MD        acetaminophen (TYLENOL) tablet 650 mg  650 mg Oral Q6H PRN Selena Billy MD        Or    acetaminophen (TYLENOL) suppository 650 mg  650 mg Rectal Q6H PRN Selena Billy MD        hydrALAZINE (APRESOLINE) injection 20 mg  20 mg IntraVENous Q6H PRN Selena Billy MD   20 mg at 01/11/23 0943    glucose chewable tablet 16 g  4 tablet Oral PRN Selena Billy MD        dextrose bolus 10% 125 mL  125 mL IntraVENous PRN Selena Billy MD        Or    dextrose bolus 10% 250 mL  250 mL IntraVENous PRN Selena Billy MD        glucagon (rDNA) injection 1 mg  1 mg SubCUTAneous PRN Selena Billy MD        dextrose 10 % infusion   IntraVENous Continuous PRN Selena Billy MD        insulin lispro (HUMALOG) injection vial 0-8 Units  0-8 Units SubCUTAneous TID WC Selena Billy MD   2 Units at 01/10/23 1653    insulin lispro (HUMALOG) injection vial 0-4 Units  0-4 Units SubCUTAneous Nightly Selena Billy MD           Review of systems:   Heart: as above   Lungs: as above   Eyes: denies changes in vision or discharge. Ears: denies changes in hearing or pain. Nose: denies epistaxis or masses   Throat: denies sore throat or trouble swallowing. Neuro: denies numbness, tingling, tremors. Skin: denies rashes or itching. : denies hematuria, dysuria   GI: denies vomiting, diarrhea   Psych: denies mood changed, anxiety, depression.     Physical Exam   BP (!) 177/78   Pulse 77   Temp 97.3 °F (36.3 °C) (Oral)   Resp 18   Ht 5' 10.5\" (1.791 m)   Wt 215 lb 9.6 oz (97.8 kg)   SpO2 99%   BMI 30.50 kg/m²   Constitutional: Oriented to person, place, and time. No distress. Well developed. Head: Normocephalic and atraumatic. Neck: Neck supple. No hepatojugular reflux. No JVD present. Carotid bruit is not present. No tracheal deviation present. No thyromegaly present. Cardiovascular: Normal rate, regular rhythm, normal heart sounds. intact distal pulses. No gallop and no friction rub. No murmur heard. Pulmonary: Breath sounds normal. No respiratory distress. No wheezes. No rales. Chest: Effort normal. No tenderness. Abdominal: Soft. Bowel sounds are normal. No distension or mass. No tenderness, rebound or guarding. Musculoskeletal: . No tenderness. No clubbing or cyanosis. Extremitites: Intact distal pulses. No edema  Neurological: Alert and oriented to person, place, and time. Skin: Skin is warm and dry. No rash noted. Not diaphoretic. No erythema. Psychiatric: Normal mood and affect. Behavior is normal.   Lymphadenopathy: No cervical adenopathy. No groin adenopathy.     CBC:   Lab Results   Component Value Date/Time    WBC 6.8 01/08/2023 01:07 AM    RBC 3.75 01/08/2023 01:07 AM    HGB 12.3 01/08/2023 01:07 AM    HCT 35.6 01/08/2023 01:07 AM    MCV 94.9 01/08/2023 01:07 AM    MCH 32.8 01/08/2023 01:07 AM    MCHC 34.6 01/08/2023 01:07 AM    RDW 13.3 01/08/2023 01:07 AM     01/08/2023 01:07 AM    MPV 10.1 01/08/2023 01:07 AM     BMP:   Lab Results   Component Value Date/Time     01/08/2023 01:07 AM    K 4.0 01/08/2023 01:07 AM     01/08/2023 01:07 AM    CO2 27 01/08/2023 01:07 AM    BUN 25 01/08/2023 01:07 AM    LABALBU 3.4 12/11/2022 01:15 PM    CREATININE 1.3 01/08/2023 01:07 AM    CALCIUM 8.9 01/08/2023 01:07 AM    GFRAA >60 08/29/2022 11:45 AM    LABGLOM 55 01/08/2023 01:07 AM     Magnesium:    Lab Results   Component Value Date/Time    MG 2.0 08/29/2022 11:45 AM     Cardiac Enzymes:   Lab Results   Component Value Date    CKTOTAL 104 04/17/2015    CKTOTAL 111 04/17/2015    CKTOTAL 114 04/17/2015    CKMB 4.3 04/17/2015    CKMB 4.4 04/17/2015    CKMB 4.7 04/17/2015    TROPHS 41 (H) 01/08/2023    TROPHS 45 (H) 01/07/2023    TROPHS 43 (H) 12/11/2022      PT/INR:    Lab Results   Component Value Date/Time    PROTIME 13.3 01/07/2023 06:23 PM    INR 1.2 01/07/2023 06:23 PM     TSH:    Lab Results   Component Value Date/Time    TSH 1.560 02/01/2014 06:20 AM     Rhythm Strip: normal sinus rhythm. ASSESSMENT & PLAN:    Patient Active Problem List   Diagnosis    COPD exacerbation ([address]    Chest pain    Hypertension    Coronary artery disease    Diabetes mellitus type 2, controlled ([address]    Hyperlipidemia    BPH (benign prostatic hyperplasia)    Hyperkalemia    Blind right eye    CKD (chronic kidney disease) stage 3, GFR 30-59 ml/min ([facility])    CAD (coronary artery disease)    Acute decompensated heart failure (HCC)    Chronic respiratory failure (HCC)    EMELY (acute kidney injury) ([address]    SBO (small bowel obstruction) ([address]    Cerebrovascular accident (CVA) ([address]    Hypernatremia    Uncontrolled type 2 diabetes mellitus with hyperglycemia (HCC)    Thrombocytopenia (HCC)    Essential hypertension    Hypertensive urgency    AV block, Mobitz 1    Acute on chronic heart failure with preserved ejection fraction ([address]     1. Rhythm issues:     Blocked PACs versus Mobitz type I AV Block. BB stopped. Clonidine held. Monitor while here. Monitor after discharge. 2. Chronically elevated troponin/CAD-CABG:    CABG x 4 in 1994 and PCI in 2008 -- both at [place] in [place], further details unknown. Non-ischemic Lexiscan stress test 2/2021. Medically manage. ASA/plavix/statin. 3. Acute on chronic HFpEF:     PO lasix/hydralazine. No BB as above. Intolerant to aldactone in past.     4. HTN/Hypertensive urgency:     Titrate hydralazine and nifedipine.     5. Lipids: Statin. 6. DM: Per primary service. 7. PAD/Carotid disease    8. CKD: Follow labs. 9. Anemia: Follow labs. 10. COPD/Chronic respiratory failure: On O2.    11. TIA: Statin/ASA/plavix. 15. Former tobacco and alcohol abuse    Sigrid Parker D.O.   Cardiologist  Cardiology, [facility]

## 2023-01-11 NOTE — PROGRESS NOTES
[facility] Progress Note    --------------------------------------------------------------------------------------  Assessment / Plan  Uncontrolled HTN, suspect due to medication noncompliance   Pt lives alone and son lives in [facility] and occasionally checks on pt, tries to manage his meds  Pt is completely blind in R eye, mostly blind in L, partially deaf and question whether he can appropriately care for himself there  Pt admitted under similar circumstances recently  Did well w therapy and looks plan is to dc back home when ready  Home meds include  Coreg 25 mg bid  Clonidine patch weekly  Hydralazine 25 mg tid  Nifedipine XR 30 mg daily  Cardio input appreciated  Stop Coreg / clonidine  Continue nifedipine / hydralazine, can up-titrate - nifedipine now at 90 mg daily and hydralazine at 50 mg tid (along with PRN IV hydralazine) w BP initially improved but now significantly elevated at 175/74  Lasix x1 yesterday  With BP still high despite the above meds, feel pt would prob benefit from addition of a third agent; given demographics and PMH would add ACEi; starting lisinopril 20 mg daily    IDDM - BG a bit high but pt's insulin not seen on home med rec so wasn't ordered; will start insulin regimen and switch diet to diabetic    Please see orders for further plan of care  Code status  Full Code  DVT prophylaxis Lovenox  Disposition  To be determined  --------------------------------------------------------------------------------------    Admission Date  1/7/2023  5:28 PM  Chief Complaint HTN    Subjective  History of Present Illness  Seen lying in bed  Feels well, no complaints  No chest pain, headache, vision changes  BP has better controlled yesterday (134/64)   Then back up significantly this AM (212/82)   But now improved a bit (175/74)  Adding third antihypertensive agent for better BP control  No new issues identified  Discussed w nursing    Review of Systems - 12-point review of systems has been reviewed and is otherwise negative except as listed in the HPI    Objective  Physical Exam  Vitals: BP (!) 175/74   Pulse 77   Temp 97.3 °F (36.3 °C) (Oral)   Resp 18   Ht 5' 10.5\" (1.791 m)   Wt 215 lb 9.6 oz (97.8 kg)   SpO2 99%   BMI 30.50 kg/m²   General: well-developed, well-nourished, no acute distress, cooperative  Skin: generally warm, dry, and intact, with normal color  HEENT: normocephalic, atraumatic, no gross abnormalities  Respiratory: clear to auscultation bilaterally without respiratory distress  Cardiovascular: regular rate and rhythm without murmur / rub / gallop  Abdominal: soft, nontender, nondistended, normoactive bowel sounds  Extremities: no obvious edema or deformity  Neurologic: awake, alert, no gross deficits  Psychiatric: normal affect, cooperative    Electronically signed by Bryan Nichole DO on 1/11/2023 at 1:30 PM

## 2023-01-11 NOTE — PLAN OF CARE
Problem: Discharge Planning  Goal: Discharge to home or other facility with appropriate resources  Outcome: Progressing     Problem: Safety - Adult  Goal: Free from fall injury  Outcome: Progressing  Flowsheets (Taken 1/11/2023 0800)  Free From Fall Injury: Instruct family/caregiver on patient safety     Problem: ABCDS Injury Assessment  Goal: Absence of physical injury  Outcome: Progressing  Flowsheets (Taken 1/11/2023 0800)  Absence of Physical Injury: Implement safety measures based on patient assessment     Problem: Pain  Goal: Verbalizes/displays adequate comfort level or baseline comfort level  Outcome: Progressing

## 2023-01-12 VITALS
TEMPERATURE: 98.6 F | OXYGEN SATURATION: 100 % | HEIGHT: 71 IN | DIASTOLIC BLOOD PRESSURE: 59 MMHG | SYSTOLIC BLOOD PRESSURE: 143 MMHG | HEART RATE: 84 BPM | RESPIRATION RATE: 19 BRPM | WEIGHT: 216.05 LBS | BODY MASS INDEX: 30.25 KG/M2

## 2023-01-12 LAB
METER GLUCOSE: 129 MG/DL (ref 74–99)
METER GLUCOSE: 242 MG/DL (ref 74–99)

## 2023-01-12 PROCEDURE — 6370000000 HC RX 637 (ALT 250 FOR IP): Performed by: STUDENT IN AN ORGANIZED HEALTH CARE EDUCATION/TRAINING PROGRAM

## 2023-01-12 PROCEDURE — 6370000000 HC RX 637 (ALT 250 FOR IP): Performed by: INTERNAL MEDICINE

## 2023-01-12 PROCEDURE — 82962 GLUCOSE BLOOD TEST: CPT

## 2023-01-12 PROCEDURE — 6360000002 HC RX W HCPCS: Performed by: STUDENT IN AN ORGANIZED HEALTH CARE EDUCATION/TRAINING PROGRAM

## 2023-01-12 PROCEDURE — 2580000003 HC RX 258: Performed by: STUDENT IN AN ORGANIZED HEALTH CARE EDUCATION/TRAINING PROGRAM

## 2023-01-12 RX ORDER — HYDRALAZINE HYDROCHLORIDE 100 MG/1
100 TABLET, FILM COATED ORAL 3 TIMES DAILY
Qty: 60 TABLET | Refills: 3 | Status: SHIPPED | OUTPATIENT
Start: 2023-01-12

## 2023-01-12 RX ORDER — NIFEDIPINE 30 MG/1
90 TABLET, FILM COATED, EXTENDED RELEASE ORAL DAILY
Qty: 30 TABLET | Refills: 3 | Status: SHIPPED | OUTPATIENT
Start: 2023-01-13

## 2023-01-12 RX ORDER — LISINOPRIL 20 MG/1
20 TABLET ORAL DAILY
Qty: 30 TABLET | Refills: 3 | Status: SHIPPED | OUTPATIENT
Start: 2023-01-13

## 2023-01-12 RX ADMIN — HYDRALAZINE HYDROCHLORIDE 50 MG: 50 TABLET ORAL at 07:59

## 2023-01-12 RX ADMIN — NIFEDIPINE 90 MG: 30 TABLET, EXTENDED RELEASE ORAL at 07:59

## 2023-01-12 RX ADMIN — PANTOPRAZOLE SODIUM 40 MG: 40 TABLET, DELAYED RELEASE ORAL at 06:05

## 2023-01-12 RX ADMIN — ATORVASTATIN CALCIUM 10 MG: 10 TABLET, FILM COATED ORAL at 07:59

## 2023-01-12 RX ADMIN — HYDRALAZINE HYDROCHLORIDE 50 MG: 50 TABLET ORAL at 14:21

## 2023-01-12 RX ADMIN — ENOXAPARIN SODIUM 40 MG: 100 INJECTION SUBCUTANEOUS at 07:59

## 2023-01-12 RX ADMIN — FUROSEMIDE 20 MG: 20 TABLET ORAL at 07:59

## 2023-01-12 RX ADMIN — SODIUM CHLORIDE, PRESERVATIVE FREE 10 ML: 5 INJECTION INTRAVENOUS at 08:00

## 2023-01-12 RX ADMIN — FERROUS SULFATE TAB 325 MG (65 MG ELEMENTAL FE) 325 MG: 325 (65 FE) TAB at 07:59

## 2023-01-12 RX ADMIN — CLOPIDOGREL BISULFATE 75 MG: 75 TABLET ORAL at 07:59

## 2023-01-12 RX ADMIN — HYDRALAZINE HYDROCHLORIDE 20 MG: 20 INJECTION INTRAMUSCULAR; INTRAVENOUS at 09:40

## 2023-01-12 RX ADMIN — BRIMONIDINE TARTRATE 1 DROP: 2 SOLUTION OPHTHALMIC at 07:59

## 2023-01-12 RX ADMIN — LISINOPRIL 20 MG: 20 TABLET ORAL at 07:59

## 2023-01-12 RX ADMIN — ASPIRIN 81 MG: 81 TABLET, CHEWABLE ORAL at 07:59

## 2023-01-12 RX ADMIN — SERTRALINE HYDROCHLORIDE 75 MG: 50 TABLET ORAL at 07:59

## 2023-01-12 RX ADMIN — INSULIN LISPRO 2 UNITS: 100 INJECTION, SOLUTION INTRAVENOUS; SUBCUTANEOUS at 11:37

## 2023-01-12 NOTE — PROGRESS NOTES
CLINICAL PHARMACY NOTE: MEDS TO BEDS    Total # of Prescriptions Filled: 3   The following medications were delivered to the patient:  Lisinopril 5mg  Hydralazine 100mg  Nifedipine er 30mg    Additional Documentation:

## 2023-01-12 NOTE — DISCHARGE SUMMARY
[facility] Physician Discharge Summary       Karla Enriquez[address]  [phone]    Schedule an appointment as soon as possible for a visit  follow up, As needed    DO [address]isc[address]  [facility] [address]  [phone]    Schedule an appointment as soon as possible for a visit  follow up      Activity level: As tolerated     Dispo: Home      Condition on discharge: Stable     Patient ID:  Cherie Atkins  [id number]  81 y.o.  1940    Admit date: 1/7/2023    Discharge date and time:  1/12/2023  3:26 PM    Admission Diagnoses: Principal Problem:    Essential hypertension  Active Problems:    Hypertensive urgency    AV block, Mobitz 1    Acute on chronic heart failure with preserved ejection fraction ([address]    Acute decompensated heart failure ([address]  Resolved Problems:    * No resolved hospital problems. *      Discharge Diagnoses: Principal Problem:    Essential hypertension  Active Problems:    Hypertensive urgency    AV block, Mobitz 1    Acute on chronic heart failure with preserved ejection fraction (HCC)    Acute decompensated heart failure ([address]  Resolved Problems:    * No resolved hospital problems. *      Consults:  IP CONSULT TO CASE MANAGEMENT  IP CONSULT TO CARDIOLOGY    Procedures: None    Hospital Course:   Patient Cherie Atkins is a 81 y.o. presented with Essential hypertension [I10]  AV block, Mobitz 1 [I44.1]  Hypertensive urgency [I16.0]  Impaired mobility and ADLs [Z74.09, Z78.9]  Cataract of right eye, unspecified cataract type [H329]    81year old male presents with elevated blood pressure. Cardiology was consulted and changes have been made to his blood pressure. Currently asymptomatic and medically stable for discharge.     Discharge Exam:    General Appearance: alert and oriented to person, place and time and in no acute distress  Skin: warm and dry  Head: normocephalic and atraumatic  Eyes: pupils equal, round, and reactive to light, extraocular eye movements intact, conjunctivae normal  Neck: neck supple and non tender without mass   Pulmonary/Chest: clear to auscultation bilaterally- no wheezes, rales or rhonchi, normal air movement, no respiratory distress  Cardiovascular: normal rate, normal S1 and S2 and no carotid bruits  Abdomen: soft, non-tender, non-distended, normal bowel sounds, no masses or organomegaly  Extremities: no cyanosis, no clubbing and no edema  Neurologic: no cranial nerve deficit and speech normal    I/O last 3 completed shifts:  In: -   Out: 1225 [Urine:1225]  No intake/output data recorded. LABS:  Recent Labs     01/11/23  1115      K 3.9      CO2 31*   BUN 19   CREATININE 1.2   GLUCOSE 263*   CALCIUM 8.3*       Recent Labs     01/11/23  1115   WBC 6.5   RBC 3.71*   HGB 11.8*   HCT 35.4*   MCV 95.4   MCH 31.8   MCHC 33.3   RDW 13.2   *   MPV 9.7       No results for input(s): POCGLU in the last 72 hours. Imaging:  XR CHEST PORTABLE    Result Date: 1/7/2023  EXAMINATION: ONE XRAY VIEW OF THE CHEST 1/7/2023 6:27 pm COMPARISON: One-month HISTORY: ORDERING SYSTEM PROVIDED HISTORY: chest pain TECHNOLOGIST PROVIDED HISTORY: Reason for exam:->chest pain FINDINGS: Cardiomegaly, post CABG. Some increased lung markings redemonstrated suggesting technique or chronic changes     Stable mobile chest appearance allowing for technical differences.        Patient Instructions:      Medication List        START taking these medications      lisinopril 20 MG tablet  Commonly known as: PRINIVIL;ZESTRIL  Take 1 tablet by mouth daily  Start taking on: January 13, 2023            CHANGE how you take these medications      hydrALAZINE 100 MG tablet  Commonly known as: APRESOLINE  Take 1 tablet by mouth 3 times daily  What changed:   medication strength  how much to take     NIFEdipine 30 MG extended release tablet  Commonly known as: ADALAT CC  Take 3 tablets by mouth daily  Start taking on: January 13, 2023  What changed: how much to take            CONTINUE taking these medications      aspirin 81 MG chewable tablet  Take 1 tablet by mouth daily     brimonidine 0.2 % ophthalmic solution  Commonly known as: ALPHAGAN     brinzolamide-brimonidine 1-0.2 % Susp     clopidogrel 75 MG tablet  Commonly known as: PLAVIX     docusate sodium 100 MG capsule  Commonly known as: COLACE     ferrous sulfate 325 (65 Fe) MG tablet  Commonly known as: IRON 325  Take 1 tablet by mouth 2 times daily (with meals). furosemide 20 MG tablet  Commonly known as: LASIX     ipratropium-albuterol 0.5-2.5 (3) MG/3ML Soln nebulizer solution  Commonly known as: DUONEB  Inhale 3 mLs into the lungs every 6 hours as needed for Shortness of Breath.      latanoprost 0.005 % ophthalmic solution  Commonly known as: XALATAN     NONFORMULARY     potassium chloride 10 MEQ extended release tablet  Commonly known as: KLOR-CON     sertraline 25 MG tablet  Commonly known as: ZOLOFT     simethicone 80 MG chewable tablet  Commonly known as: MYLICON     simvastatin 10 MG tablet  Commonly known as: ZOCOR     tamsulosin 0.4 MG capsule  Commonly known as: FLOMAX     TRIPLE OMEGA-3-6-9 PO            STOP taking these medications      carvedilol 25 MG tablet  Commonly known as: COREG     cloNIDine 0.2 MG/24HR  Commonly known as: CATAPRES     insulin lispro 100 UNIT/ML injection vial  Commonly known as: HUMALOG     nitroGLYCERIN 0.4 MG SL tablet  Commonly known as: NITROSTAT     omeprazole 20 MG delayed release capsule  Commonly known as: PRILOSEC     OXYGEN               Where to Get Your Medications        You can get these medications from any pharmacy    Bring a paper prescription for each of these medications  hydrALAZINE 100 MG tablet  lisinopril 20 MG tablet  NIFEdipine 30 MG extended release tablet           Note that 36 minutes was spent in preparing discharge papers, discussing discharge with patient, medication review, etc.    Signed:  Electronically signed by Stephanie Sánchez DO on 1/12/2023 at 3:26 PM

## 2023-01-12 NOTE — PROGRESS NOTES
[facility] Cardiology Inpatient Progress Note    Patient is a 81 y.o. male of [place], [place] seen in hospital follow up. Chief complaint: Irregular heartbeat/HTN    HPI: No CP or SOB. PAST MEDICAL HISTORY:    CAD s/p CABG x 4 at [place] in [place] in 1994, further details unknown  Inferior wall myocardial infarction. 1994 status post aortocoronary bypass grafting x4 ([facility] in [place], further details unknown at this time). November 13, 2007, adenosine nuclear stress test:  Normal examination with no evidence of left ventricular myocardium at risk for stress-induced ischemia with an ejection fraction of 46%. November 10, 2008, 2-D echocardiogram showed moderate concentric left ventricular hypertrophy. Ejection fraction visually estimated at 60%. Values were not well-visualized. (As interpreted by Dr. Rosmery Daly.)  May 24, 2013, [facility] nuclear stress study:  There is an area of relative hypokinesia of the LV myocardial contraction in the inferior wall toward the basal region where diminished radionuclide activity is seen and the post pharmacological stress suspicious for an area of pharmacological-induced ischemia. Planned for medical management with nitrate added at that time. [facility] [place] - records not available. Most recent ischemic evaluation -- Lexiscan stress test 2/2021: Fixed inferolateral defect. No reversible ischemia. LVEF 64%. However, visually the LV appears severely dilated with moderate global hypokinesis and severe hypokinesis of the inferolateral wall. May 23, 2013, 2-D echocardiogram (as interpreted by Dr. Rosmery Daly): Normal left ventricle size, mild concentric left ventricular hypertrophy. Ejection fraction visually estimated at 55%. Stage 2 diastolic dysfunction. September 22, 2014, 2-D echocardiogram (as interpreted by Dr. Rosmery Daly): Normal left ventricle size. Moderate concentric left ventricular hypertrophy.   Ejection fraction visually estimated at 70%. Most recent TTE, 3/2021 Dr. Adriel Fonseca: Normal LV size and function, EF 65±5%. Borderline septal hypokinesis. Stage I DD. Mildly dilated RV, TAPSE normal.  Moderately dilated LA. Moderate MAC.   Chronic HFpEF   PAD  Carotid artery disease, follows with Vascular surgery  Chronic RBBB  HTN  HLD, on statin therapy  Insulin requiring T2DM  CKD, baseline Cr 1.3  Chronic iron deficiency anemia  COPD with chronic respiratory failure, on home O2  Former tobacco and alcohol abuse  Osteoarthritis  Osteoporosis  Glaucoma: Status post surgery to left eye with cataract removal. 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  2007, status post most recent surgery to right eye  History of TIA (further details unknown)  ALDACTONE intolerance (hyperkalemia reaction)    Patient Active Problem List   Diagnosis    COPD exacerbation ([address]    Chest pain    Hypertension    Coronary artery disease    Diabetes mellitus type 2, controlled ([address]    Hyperlipidemia    BPH (benign prostatic hyperplasia)    Hyperkalemia    Blind right eye    CKD (chronic kidney disease) stage 3, GFR 30-59 ml/min (HCC)    CAD (coronary artery disease)    Acute decompensated heart failure (HCC)    Chronic respiratory failure (HCC)    EMELY (acute kidney injury) ([address]    SBO (small bowel obstruction) ([address]    Cerebrovascular accident (CVA) ([address]    Hypernatremia    Uncontrolled type 2 diabetes mellitus with hyperglycemia (HCC)    Thrombocytopenia ([address]    Essential hypertension    Hypertensive urgency    AV block, Mobitz 1    Acute on chronic heart failure with preserved ejection fraction (HCC)       Allergies   Allergen Reactions    Dorzolamide Swelling    Seasonal      Other reaction(s): SWELLING-LOWER EYE LIDS    Spironolactone Other (See Comments)     Hyperkalemia--4/16/15  hyperkalemia  Hyperkalemia--4/16/15       Current Facility-Administered Medications   Medication Dose Route Frequency Provider Last Rate Last Admin    lisinopril (PRINIVIL;ZESTRIL) tablet 20 mg  20 mg Oral Daily Scott R Lockso, DO   20 mg at 01/12/23 0759    NIFEdipine (ADALAT CC) extended release tablet 90 mg  90 mg Oral Daily Zaki Ike, DO   90 mg at 01/12/23 0759    hydrALAZINE (APRESOLINE) tablet 50 mg  50 mg Oral TID Zaki Ramires, DO   50 mg at 01/12/23 0759    aspirin chewable tablet 81 mg  81 mg Oral Daily Alonso Jessica MD   81 mg at 01/12/23 0759    brimonidine (ALPHAGAN) 0.2 % ophthalmic solution 1 drop  1 drop Left Eye BID Alonso Jessica MD   1 drop at 01/12/23 0759    [Held by provider] cloNIDine (CATAPRES) tablet 0.05 mg  0.05 mg Oral BID Alonso Jessica MD        clopidogrel (PLAVIX) tablet 75 mg  75 mg Oral Daily Alonso Jessica MD   75 mg at 01/12/23 0759    docusate sodium (COLACE) capsule 100 mg  100 mg Oral Daily PRN Alonso Jessica MD        ferrous sulfate (IRON 325) tablet 325 mg  325 mg Oral BID WC Alonso Jessica MD   325 mg at 01/12/23 0759    furosemide (LASIX) tablet 20 mg  20 mg Oral BID Alonso Jessica MD   20 mg at 01/12/23 0759    ipratropium-albuterol (DUONEB) nebulizer solution 3 mL  3 mL Inhalation Q6H PRN Alonso Jessica MD        latanoprost (XALATAN) 0.005 % ophthalmic solution 1 drop  1 drop Left Eye Nightly Alonso Jessica MD   1 drop at 01/11/23 1948    nitroGLYCERIN (NITROSTAT) SL tablet 0.4 mg  0.4 mg SubLINGual Q5 Min PRN Alonso Jessica MD        pantoprazole (PROTONIX) tablet 40 mg  40 mg Oral QAM AC Alonso Jessica MD   40 mg at 01/12/23 8982    sertraline (ZOLOFT) tablet 75 mg  75 mg Oral Daily Alonso Jessica MD   75 mg at 01/12/23 0759    simethicone (MYLICON) chewable tablet 80 mg  80 mg Oral Q6H PRN Alonso Jessica MD        atorvastatin (LIPITOR) tablet 10 mg  10 mg Oral Daily Alonso Jessica MD   10 mg at 01/12/23 0759    tamsulosin (FLOMAX) capsule 0.4 mg  0.4 mg Oral QPM Alonso Jessica MD   0.4 mg at 01/11/23 1714    sodium chloride flush 0.9 % injection 5-40 mL  5-40 mL IntraVENous 2 times per day Sergei Gordon MD   10 mL at 01/12/23 0800    sodium chloride flush 0.9 % injection 5-40 mL  5-40 mL IntraVENous PRN Sergei Gordon MD   10 mL at 01/08/23 0120    0.9 % sodium chloride infusion   IntraVENous PRN Sergei Gordon MD        enoxaparin (LOVENOX) injection 40 mg  40 mg SubCUTAneous Daily Sergei Gordon MD   40 mg at 01/12/23 0759    ondansetron (ZOFRAN-ODT) disintegrating tablet 4 mg  4 mg Oral Q8H PRN Sergei Gordon MD        Or    ondansetron [facility]) injection 4 mg  4 mg IntraVENous Q6H PRN Sergei Gordon MD        polyethylene glycol (GLYCOLAX) packet 17 g  17 g Oral Daily PRN Sergei Gordon MD        acetaminophen (TYLENOL) tablet 650 mg  650 mg Oral Q6H PRN Sergei Gordon MD        Or    acetaminophen (TYLENOL) suppository 650 mg  650 mg Rectal Q6H PRN Sergei Gordon MD        hydrALAZINE (APRESOLINE) injection 20 mg  20 mg IntraVENous Q6H PRN Sergei Gordon MD   20 mg at 01/11/23 1900    glucose chewable tablet 16 g  4 tablet Oral PRN Sergei Gordon MD        dextrose bolus 10% 125 mL  125 mL IntraVENous PRN Sergei Gordon MD        Or    dextrose bolus 10% 250 mL  250 mL IntraVENous PRN Sergei Gordon MD        glucagon (rDNA) injection 1 mg  1 mg SubCUTAneous PRN Sergei Gordon MD        dextrose 10 % infusion   IntraVENous Continuous PRN Sergei Gordon MD        insulin lispro (HUMALOG) injection vial 0-8 Units  0-8 Units SubCUTAneous TID  Sergei Gordon MD   2 Units at 01/11/23 1715    insulin lispro (HUMALOG) injection vial 0-4 Units  0-4 Units SubCUTAneous Nightly Sergei Gordon MD           Review of systems:   Heart: as above   Lungs: as above   Eyes: denies changes in vision or discharge. Ears: denies changes in hearing or pain. Nose: denies epistaxis or masses   Throat: denies sore throat or trouble swallowing. Neuro: denies numbness, tingling, tremors. Skin: denies rashes or itching.    : denies hematuria, dysuria   GI: denies vomiting, diarrhea   Psych: denies mood changed, anxiety, depression. Physical Exam   BP (!) 218/90 Comment: medications given  Pulse 82   Temp 98.6 °F (37 °C) (Oral)   Resp 19   Ht 5' 10.5\" (1.791 m)   Wt 216 lb 0.8 oz (98 kg)   SpO2 100%   BMI 30.56 kg/m²   Constitutional: Oriented to person, place, and time. No distress. Well developed. Head: Normocephalic and atraumatic. Neck: Neck supple. No hepatojugular reflux. No JVD present. Carotid bruit is not present. No tracheal deviation present. No thyromegaly present. Cardiovascular: Normal rate, regular rhythm, normal heart sounds. intact distal pulses. No gallop and no friction rub. No murmur heard. Pulmonary: Breath sounds normal. No respiratory distress. No wheezes. No rales. Chest: Effort normal. No tenderness. Abdominal: Soft. Bowel sounds are normal. No distension or mass. No tenderness, rebound or guarding. Musculoskeletal: . No tenderness. No clubbing or cyanosis. Extremitites: Intact distal pulses. No edema  Neurological: Alert and oriented to person, place, and time. Skin: Skin is warm and dry. No rash noted. Not diaphoretic. No erythema. Psychiatric: Normal mood and affect. Behavior is normal.   Lymphadenopathy: No cervical adenopathy. No groin adenopathy.     CBC:   Lab Results   Component Value Date/Time    WBC 6.5 01/11/2023 11:15 AM    RBC 3.71 01/11/2023 11:15 AM    HGB 11.8 01/11/2023 11:15 AM    HCT 35.4 01/11/2023 11:15 AM    MCV 95.4 01/11/2023 11:15 AM    MCH 31.8 01/11/2023 11:15 AM    MCHC 33.3 01/11/2023 11:15 AM    RDW 13.2 01/11/2023 11:15 AM     01/11/2023 11:15 AM    MPV 9.7 01/11/2023 11:15 AM     BMP:   Lab Results   Component Value Date/Time     01/11/2023 11:15 AM    K 3.9 01/11/2023 11:15 AM    K 4.0 01/08/2023 01:07 AM     01/11/2023 11:15 AM    CO2 31 01/11/2023 11:15 AM    BUN 19 01/11/2023 11:15 AM    LABALBU 3.4 12/11/2022 01:15 PM    CREATININE 1.2 01/11/2023 11:15 AM    CALCIUM 8.3 01/11/2023 11:15 AM    GFRAA >60 08/29/2022 11:45 AM    LABGLOM >60 01/11/2023 11:15 AM     Magnesium:    Lab Results   Component Value Date/Time    MG 2.0 08/29/2022 11:45 AM     Cardiac Enzymes:   Lab Results   Component Value Date    CKTOTAL 104 04/17/2015    CKTOTAL 111 04/17/2015    CKTOTAL 114 04/17/2015    CKMB 4.3 04/17/2015    CKMB 4.4 04/17/2015    CKMB 4.7 04/17/2015    TROPHS 41 (H) 01/08/2023    TROPHS 45 (H) 01/07/2023    TROPHS 43 (H) 12/11/2022      PT/INR:    Lab Results   Component Value Date/Time    PROTIME 13.3 01/07/2023 06:23 PM    INR 1.2 01/07/2023 06:23 PM     TSH:    Lab Results   Component Value Date/Time    TSH 1.560 02/01/2014 06:20 AM     Rhythm Strip: normal sinus rhythm. ASSESSMENT & PLAN:    Patient Active Problem List   Diagnosis    COPD exacerbation ([address]    Chest pain    Hypertension    Coronary artery disease    Diabetes mellitus type 2, controlled ([address]    Hyperlipidemia    BPH (benign prostatic hyperplasia)    Hyperkalemia    Blind right eye    CKD (chronic kidney disease) stage 3, GFR 30-59 ml/min ([facility])    CAD (coronary artery disease)    Acute decompensated heart failure (HCC)    Chronic respiratory failure (HCC)    EMELY (acute kidney injury) ([address]    SBO (small bowel obstruction) ([address]    Cerebrovascular accident (CVA) ([address]    Hypernatremia    Uncontrolled type 2 diabetes mellitus with hyperglycemia (HCC)    Thrombocytopenia (HCC)    Essential hypertension    Hypertensive urgency    AV block, Mobitz 1    Acute on chronic heart failure with preserved ejection fraction ([address]     1. Rhythm issues:     Blocked PACs versus Mobitz type I AV Block. BB stopped. Clonidine held. Monitor while here. Monitor after discharge. 2. Chronically elevated troponin/CAD-CABG:    CABG x 4 in 1994 and PCI in 2008 -- both at [place] in [place], further details unknown. Non-ischemic Lexiscan stress test 2/2021. Medically manage. ASA/plavix/statin.      3. Acute on chronic HFpEF:     PO lasix/hydralazine. No BB as above. Intolerant to aldactone in past.     4. HTN/Hypertensive urgency:     Titrate hydralazine and nifedipine. 5. Lipids: Statin. 6. DM: Per primary service. 7. PAD/Carotid disease    8. CKD: Follow labs. 9. Anemia: Follow labs. 10. COPD/Chronic respiratory failure: On O2.    11. TIA: Statin/ASA/plavix. 15. Former tobacco and alcohol abuse    Jake Yadav D.O.   Cardiologist  Cardiology, [facility]

## 2023-01-13 ENCOUNTER — TELEPHONE (OUTPATIENT)
Dept: CARDIOLOGY CLINIC | Age: 83
End: 2023-01-13

## 2023-02-24 ENCOUNTER — OFFICE VISIT (OUTPATIENT)
Dept: CARDIOLOGY CLINIC | Age: 83
End: 2023-02-24
Payer: OTHER GOVERNMENT

## 2023-02-24 VITALS
SYSTOLIC BLOOD PRESSURE: 124 MMHG | HEIGHT: 70 IN | DIASTOLIC BLOOD PRESSURE: 60 MMHG | WEIGHT: 208 LBS | HEART RATE: 83 BPM | RESPIRATION RATE: 18 BRPM | BODY MASS INDEX: 29.78 KG/M2

## 2023-02-24 DIAGNOSIS — R00.2 PALPITATIONS: ICD-10-CM

## 2023-02-24 DIAGNOSIS — I25.10 CORONARY ARTERY DISEASE INVOLVING NATIVE HEART WITHOUT ANGINA PECTORIS, UNSPECIFIED VESSEL OR LESION TYPE: Primary | ICD-10-CM

## 2023-02-24 PROCEDURE — 99213 OFFICE O/P EST LOW 20 MIN: CPT | Performed by: CLINICAL NURSE SPECIALIST

## 2023-02-24 PROCEDURE — 3074F SYST BP LT 130 MM HG: CPT | Performed by: CLINICAL NURSE SPECIALIST

## 2023-02-24 PROCEDURE — 3078F DIAST BP <80 MM HG: CPT | Performed by: CLINICAL NURSE SPECIALIST

## 2023-02-24 PROCEDURE — 1123F ACP DISCUSS/DSCN MKR DOCD: CPT | Performed by: CLINICAL NURSE SPECIALIST

## 2023-02-24 NOTE — PROGRESS NOTES
Patient was seen today and a 14 Day Zio Xt monitor was placed. Monitor was ordered by Dr. Beti Stockton. The monitor was applied, instructions were given to the patient. Patient stated understanding and gave verbalize readback.    Monitor company: [employer]  Serial number: [id number]

## 2023-02-24 NOTE — PROGRESS NOTES
OUTPATIENT CARDIOLOGY FOLLOW-UP    Name: Myles Fischer    Age: 81 y.o. Primary Care Physician: Letitia Feng PA-C    Date of Service: 2023    Chief Complaint: Palpitations. Hypertension. Interim History:    Mr. Jazmine Franz is an 81year old gentleman who is being seen today in post hospital follow up. His past medical history includes coronary artery disease s/p CABG and PCI; chronic HFpEF; and chronic right BBB. He is known to Dr. Will Hernandez service and was hospitalized in 2023 with uncontrolled hypertension and exertional dyspnea. During his admission, telemetry showed transient episodes of Mobitz type I AV block, and hs-Elzbieta levels were 41>>45. Beta blocker was stopped due to the AV block, and hydralazine and nifedipine were titrated. Zio monitor was ordered but he was discharged without it. Since returning home, he has had no chest discomfort, dyspnea, dizziness, or syncope. He often has palpitations when lying on his left side. He lives alone but is family assists with his care. Review of Systems:   Cardiac: Palpitations as above. No chest pain or edema. General: No fever, chills, weight changes or unusual fatigue. Pulmonary: No dyspnea, orthopnea, or PND. HEENT: No epistaxis or bleeding gums. He is legally blind. GI: No nausea, vomiting, or abdominal pain. : No dysuria, hematuria  Endocrine: No temperature intolerance or excessive thirst.   Musculoskeletal: No myalgias, arthralgias, or recent falls. He uses no assistive devices. Skin:No rash or ulcerations. Neuro: No dizziness or syncope. Psych: Currently with no depression, anxiety    Past Medical History:  CAD s/p CABG x 4 at [place] in [place] in , further details unknown  Inferior wall myocardial infarction.  status post aortocoronary bypass grafting x4 ([facility] [place], further details unknown at this time).   2007, adenosine nuclear stress test:  Normal examination with no evidence of left ventricular myocardium at risk for stress-induced ischemia with an ejection fraction of 46%. November 10, 2008, 2-D echocardiogram showed moderate concentric left ventricular hypertrophy. Ejection fraction visually estimated at 60%. Values were not well-visualized. (As interpreted by Dr. Aruna Bone.)  May 24, 2013, [facility] nuclear stress study:  There is an area of relative hypokinesia of the LV myocardial contraction in the inferior wall toward the basal region where diminished radionuclide activity is seen and the post pharmacological stress suspicious for an area of pharmacological-induced ischemia. Planned for medical management with nitrate added at that time. LHC and st[facility] 2018 [facility] [place] per patient - records not available. Most recent ischemic evaluation -- Lexiscan stress test 2/2021: Fixed inferolateral defect. No reversible ischemia. LVEF 64%. However, visually the LV appears severely dilated with moderate global hypokinesis and severe hypokinesis of the inferolateral wall. May 23, 2013, 2-D echocardiogram (as interpreted by Dr. Aruna Bone): Normal left ventricle size, mild concentric left ventricular hypertrophy. Ejection fraction visually estimated at 55%. Stage 2 diastolic dysfunction. September 22, 2014, 2-D echocardiogram (as interpreted by Dr. Aruna Bone): Normal left ventricle size. Moderate concentric left ventricular hypertrophy. Ejection fraction visually estimated at 70%. Most recent TTE, 3/2021 Dr. Beny Saldana: Normal LV size and function, EF 65±5%. Borderline septal hypokinesis. Stage I DD. Mildly dilated RV, TAPSE normal.  Moderately dilated LA. Moderate MAC.   Chronic HFpEF   PAD  Carotid artery disease, follows with Vascular surgery  Chronic RBBB  Transient Mobitz type I AV block during admission in 1/2023  HTN  HLD, on statin therapy  Insulin requiring T2DM  CKD, baseline Cr 1.3  Chronic iron deficiency anemia  COPD with chronic respiratory failure, on home O2  Former tobacco and alcohol abuse  Osteoarthritis  Osteoporosis  Glaucoma: Status post surgery to left eye with cataract removal. 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  , status post most recent surgery to right eye  History of TIA (further details unknown)  ALDACTONE intolerance (hyperkalemia)      Social History:  Social History     Socioeconomic History    Marital status: Single     Spouse name: Not on file    Number of children: Not on file    Years of education: Not on file    Highest education level: Not on file   Occupational History    Not on file   Tobacco Use    Smoking status: Former     Packs/day: 1.50     Types: Cigarettes     Quit date: 2/15/1982     Years since quittin.0    Smokeless tobacco: Former     Quit date: 1984   Vaping Use    Vaping Use: Never used   Substance and Sexual Activity    Alcohol use: No    Drug use: No    Sexual activity: Not on file   Other Topics Concern    Not on file   Social History Narrative    Not on file     Social Determinants of Health     Financial Resource Strain: Not on file   Food Insecurity: Not on file   Transportation Needs: Not on file   Physical Activity: Not on file   Stress: Not on file   Social Connections: Not on file   Intimate Partner Violence: Not on file   Housing Stability: Not on file       Allergies:   Allergies   Allergen Reactions    Dorzolamide Swelling    Seasonal      Other reaction(s): SWELLING-LOWER EYE LIDS    Spironolactone Other (See Comments)     Hyperkalemia--4/16/15  hyperkalemia  Hyperkalemia--4/16/15       Current Medications:  Current Outpatient Medications   Medication Sig Dispense Refill    lisinopril (PRINIVIL;ZESTRIL) 20 MG tablet Take 1 tablet by mouth daily 30 tablet 3    NIFEdipine (ADALAT CC) 30 MG extended release tablet Take 3 tablets by mouth daily 30 tablet 3    hydrALAZINE (APRESOLINE) 100 MG tablet Take 1 tablet by mouth 3 times daily 60 tablet 3    sertraline (ZOLOFT) 25 MG tablet Take 75 mg by mouth      brinzolamide-brimonidine 1-0.2 % SUSP Apply to eye      clopidogrel (PLAVIX) 75 MG tablet Take 75 mg by mouth daily      furosemide (LASIX) 20 MG tablet Take 20 mg by mouth 2 times daily      potassium chloride (KLOR-CON) 10 MEQ extended release tablet Take 10 mEq by mouth daily      aspirin 81 MG chewable tablet Take 1 tablet by mouth daily 30 tablet 3    NONFORMULARY Omega XL, contains  Green lipped mussel oil extract, EPA, DHA, ETA, and HORTENCIA      Omega 3-6-9 Fatty Acids (TRIPLE OMEGA-3-6-9 PO) Take 1 tablet by mouth 2 times daily. latanoprost (XALATAN) 0.005 % ophthalmic solution Place 1 drop into the left eye nightly. simethicone (MYLICON) 80 MG chewable tablet Take 80 mg by mouth every 6 hours as needed for Flatulence. brimonidine (ALPHAGAN) 0.2 % ophthalmic solution Place 1 drop into the left eye 2 times daily. ferrous sulfate 325 (65 FE) MG tablet Take 1 tablet by mouth 2 times daily (with meals). 30 tablet 1    ipratropium-albuterol (DUONEB) 0.5-2.5 (3) MG/3ML SOLN nebulizer solution Inhale 3 mLs into the lungs every 6 hours as needed for Shortness of Breath. 360 mL 1    simvastatin (ZOCOR) 10 MG tablet Take 10 mg by mouth daily. tamsulosin (FLOMAX) 0.4 MG capsule Take 0.4 mg by mouth every evening. docusate sodium (COLACE) 100 MG capsule Take 100 mg by mouth daily as needed. Indications: Constipation       No current facility-administered medications for this visit. Physical Exam:  /60   Pulse 83   Resp 18   Ht 5' 10\" (1.778 m)   Wt 208 lb (94.3 kg)   BMI 29.84 kg/m²   Wt Readings from Last 3 Encounters:   02/24/23 208 lb (94.3 kg)   01/12/23 216 lb 0.8 oz (98 kg)   12/11/22 200 lb (90.7 kg)     Appearance: Awake, alert and oriented x 3, no apparent acute distress. Skin: Intact, no rash.  Warm and dry   Head: Normocephalic, atraumatic  Eyes: EOMI, no conjunctival erythema  ENMT: No pharyngeal erythema, MMM, no rhinorrhea  Neck: Supple, no elevated JVP or carotid bruits  Lungs: Clear to auscultation bilaterally. No wheezes, rales, or rhonchi. Cardiac: Regular rate and rhythm, +S1S2, no murmurs apparent  Abdomen: Soft, nontender, +bowel sounds  Extremities: Moves all extremities x 4, no lower extremity edema  Neurologic: No focal motor deficits apparent, normal mood and affect, alert and oriented x 3  Peripheral Pulses: Intact posterior tibial pulses bilaterally      Cardiac Tests:  EKG today showed SR with right BBB, LAFB, and old inferior infarct     Assessment:   Palpitations  Transient second degree type I AV block during recent admission   Chronic right BBB  Chronic HFpEF: appears euvolemic today   CAD with CABG x 4 in 1994 and PCI in 2008. Nonischemic stress 2/2021  Carotid artery disease   PAD  Questionable history of TIA  Hypertension  Hyperlipidemia  Diabetes mellitus type II: insulin requiring   CKD  COPD with intermittent home oxygen use  Former tobacco abuse  Chronic iron deficiency anemia  Blindness   Aldactone intolerance: hyperkalemia       Treatment Plan:   Zio XT monitor  Continue current medications for now  Tentative return office visit with Dr. Georgina Barajas in six weeks: he was asked to contact us with questions or concerns prior to then. The patient's current medication list, allergies, problem list and results of all previously ordered testing were reviewed at today's visit.       [facility]) Cardiology

## 2023-03-20 ENCOUNTER — TELEPHONE (OUTPATIENT)
Dept: NON INVASIVE DIAGNOSTICS | Age: 83
End: 2023-03-20

## 2023-03-20 ENCOUNTER — TELEPHONE (OUTPATIENT)
Dept: CARDIOLOGY CLINIC | Age: 83
End: 2023-03-20

## 2023-03-20 DIAGNOSIS — R94.31 ABNORMAL HOLTER MONITOR FINDING: Primary | ICD-10-CM

## 2023-03-20 NOTE — TELEPHONE ENCOUNTER
----- Message from Shani Gage MD sent at 3/20/2023 12:21 PM EDT -----  Patient's of Dr. Dave Bain. He would benefit from EP consult and pacemaker implantation. Thanks.

## 2023-03-22 DIAGNOSIS — R00.2 PALPITATIONS: ICD-10-CM

## 2023-04-03 NOTE — PROGRESS NOTES
preserved ejection fraction [facility])    who presents with abnormal cardiac monitor. 1. Complete AV block and second degree AV block Mobitz type I  - Symptomatic with fatigue  - Underlying RBBB and LAFB  - MCOT: 3/2023:  64 episode(s) of AV Block (3rd°) occurred during day time, lasting a total of 17 mins 5 secs. - Off AV node blocker agents. - Class I indication for pacemaker implantation.  - Risks, benefits, and alternatives of permanent pacemaker implantation were discussed in detail today. These risks include but are not limited to bleeding, infection, blood clot, pneumothorax, hemothorax,cardiac valve damage, cardiac perforation and tamponade required emergent thoracotomy, contrast induced nephropathy leading to short or even long term dialysis, vascular injury requiring emergent surgical repair, lead dislodgement, stroke and even death. The patient understands these risks and agrees to proceed with pacemaker implantation. 2. RBBB and LAFB  - EK2023: QRS: 100 msec. 3. Coronary artery disease  - Status post CABG x 4 at [place] in [place] in     4. Chronic HFpEF  - TTE: 3/2021 Dr. Flori Reese: Normal LV size and function, EF 65±5%  - Euvolemic and compensated. - On Lisinopril and Lasix    5. Peripheral artery disease and carotid artery disease  - Follows with vascular. 6. Hypertension  - Slightly elevated today. - On Adalat, Apresoline and Lasix. 7. Hyperlipidemia  - On Zocor. 8. COPD  - Managed by PCP.  - On home oxygen. 9. TIA  - History of.     10. Diabetes mellitus  - Managed by PCP. 11. BPH  - On Flomax. Recommendations: Will schedule dual chamber pacemaker implantation. Await for echocardiogram results for LV EF before the procedure. Resume Beta-blocker after the procedure. Follow up after pacemaker implantation. Encouraged the patient to call the office for any questions or concerns.     I have spent a total of 60 minutes with the patient and the family reviewing the

## 2023-04-04 ENCOUNTER — TELEPHONE (OUTPATIENT)
Dept: NON INVASIVE DIAGNOSTICS | Age: 83
End: 2023-04-04

## 2023-04-04 ENCOUNTER — TELEPHONE (OUTPATIENT)
Dept: CARDIOLOGY CLINIC | Age: 83
End: 2023-04-04

## 2023-04-04 ENCOUNTER — OFFICE VISIT (OUTPATIENT)
Dept: NON INVASIVE DIAGNOSTICS | Age: 83
End: 2023-04-04
Payer: OTHER GOVERNMENT

## 2023-04-04 ENCOUNTER — OFFICE VISIT (OUTPATIENT)
Dept: CARDIOLOGY CLINIC | Age: 83
End: 2023-04-04
Payer: OTHER GOVERNMENT

## 2023-04-04 VITALS
HEART RATE: 78 BPM | RESPIRATION RATE: 16 BRPM | BODY MASS INDEX: 29.54 KG/M2 | WEIGHT: 206.3 LBS | DIASTOLIC BLOOD PRESSURE: 72 MMHG | SYSTOLIC BLOOD PRESSURE: 142 MMHG | HEIGHT: 70 IN

## 2023-04-04 VITALS
WEIGHT: 206 LBS | RESPIRATION RATE: 18 BRPM | DIASTOLIC BLOOD PRESSURE: 72 MMHG | BODY MASS INDEX: 29.49 KG/M2 | SYSTOLIC BLOOD PRESSURE: 146 MMHG | HEART RATE: 78 BPM | HEIGHT: 70 IN

## 2023-04-04 DIAGNOSIS — I44.1 AV BLOCK, MOBITZ 1: Primary | ICD-10-CM

## 2023-04-04 DIAGNOSIS — R00.2 PALPITATIONS: Primary | ICD-10-CM

## 2023-04-04 PROCEDURE — 1123F ACP DISCUSS/DSCN MKR DOCD: CPT | Performed by: INTERNAL MEDICINE

## 2023-04-04 PROCEDURE — 3077F SYST BP >= 140 MM HG: CPT | Performed by: INTERNAL MEDICINE

## 2023-04-04 PROCEDURE — 99205 OFFICE O/P NEW HI 60 MIN: CPT | Performed by: INTERNAL MEDICINE

## 2023-04-04 PROCEDURE — 3078F DIAST BP <80 MM HG: CPT | Performed by: INTERNAL MEDICINE

## 2023-04-04 PROCEDURE — 93000 ELECTROCARDIOGRAM COMPLETE: CPT | Performed by: INTERNAL MEDICINE

## 2023-04-04 PROCEDURE — 99214 OFFICE O/P EST MOD 30 MIN: CPT | Performed by: INTERNAL MEDICINE

## 2023-04-04 RX ORDER — INSULIN GLARGINE-YFGN 100 [IU]/ML
INJECTION, SOLUTION SUBCUTANEOUS
COMMUNITY
Start: 2023-03-07

## 2023-04-04 NOTE — PROGRESS NOTES
file    Highest education level: Not on file   Occupational History    Not on file   Tobacco Use    Smoking status: Former     Packs/day: 1.50     Types: Cigarettes     Quit date: 2/15/1982     Years since quittin.1    Smokeless tobacco: Former     Quit date: 1984   Vaping Use    Vaping Use: Never used   Substance and Sexual Activity    Alcohol use: No    Drug use: No    Sexual activity: Not on file   Other Topics Concern    Not on file   Social History Narrative    Not on file     Social Determinants of Health     Financial Resource Strain: Not on file   Food Insecurity: Not on file   Transportation Needs: Not on file   Physical Activity: Not on file   Stress: Not on file   Social Connections: Not on file   Intimate Partner Violence: Not on file   Housing Stability: Not on file       Family History   Family history unknown: Yes       Review of Systems:  Heart: as above   Lungs: as above   Eyes: denies changes in vision or discharge. Ears: denies changes in hearing or pain. Nose: denies epistaxis or masses   Throat: denies sore throat or trouble swallowing. Neuro: denies numbness, tingling, tremors. Skin: denies rashes or itching. : denies hematuria, dysuria   GI: denies vomiting, diarrhea   Psych: denies mood changed, anxiety, depression. All other systems negative. Physical Exam   BP (!) 142/72   Pulse 78   Resp 16   Ht 5' 10\" (1.778 m)   Wt 206 lb 4.8 oz (93.6 kg)   BMI 29.60 kg/m²   Constitutional: Oriented to person, place, and time. Well-developed and well-nourished. No distress. Head: Normocephalic and atraumatic. Eyes: EOM are normal. Pupils are equal, round, and reactive to light. Neck: Normal range of motion. Neck supple. No hepatojugular reflux and no JVD present. Carotid bruit is not present. No tracheal deviation present. No thyromegaly present. Cardiovascular: Normal rate, regular rhythm, normal heart sounds and intact distal pulses.   Exam reveals no gallop and

## 2023-04-04 NOTE — TELEPHONE ENCOUNTER
Spoke with Elsie Guadalupe with [place] echo department and the echo is awaiting authorization from the [place] which they did request. I adv the patient needs this done asap because needs a procedure.      Electronically signed by Sylwia Braun MA on 4/4/2023 at 2:19 PM

## 2023-04-07 NOTE — TELEPHONE ENCOUNTER
[facility] contacted and message left to send auth for echo and procedure.     Electronically signed by Elisa Peters MA on 4/7/2023 at 1:05 PM

## 2023-04-27 ENCOUNTER — TELEPHONE (OUTPATIENT)
Dept: CARDIAC CATH/INVASIVE PROCEDURES | Age: 83
End: 2023-04-27

## 2023-04-27 ENCOUNTER — TELEPHONE (OUTPATIENT)
Dept: VASCULAR SURGERY | Age: 83
End: 2023-04-27

## 2023-04-27 NOTE — TELEPHONE ENCOUNTER
Message left on Shayna's voicemail at the [employer]. A that a new authorization is needed for the pt's 5/9/23 visit with Dr. Santos Munguia.

## 2023-04-28 ENCOUNTER — ANESTHESIA (OUTPATIENT)
Dept: CARDIAC CATH/INVASIVE PROCEDURES | Age: 83
End: 2023-04-28

## 2023-04-28 ENCOUNTER — HOSPITAL ENCOUNTER (OUTPATIENT)
Dept: GENERAL RADIOLOGY | Age: 83
Discharge: HOME | DRG: 243 | End: 2023-04-30
Attending: INTERNAL MEDICINE
Payer: OTHER GOVERNMENT

## 2023-04-28 ENCOUNTER — HOSPITAL ENCOUNTER (INPATIENT)
Dept: CARDIAC CATH/INVASIVE PROCEDURES | Age: 83
LOS: 2 days | Discharge: HOME | DRG: 243 | End: 2023-05-02
Attending: INTERNAL MEDICINE | Admitting: INTERNAL MEDICINE
Payer: OTHER GOVERNMENT

## 2023-04-28 ENCOUNTER — ANESTHESIA EVENT (OUTPATIENT)
Dept: CARDIAC CATH/INVASIVE PROCEDURES | Age: 83
End: 2023-04-28

## 2023-04-28 PROBLEM — Z95.0 S/P PLACEMENT OF CARDIAC PACEMAKER: Status: ACTIVE | Noted: 2023-04-28

## 2023-04-28 PROBLEM — R00.1 BRADYCARDIA: Status: ACTIVE | Noted: 2023-04-28

## 2023-04-28 PROBLEM — I44.2 COMPLETE AV BLOCK (HCC): Status: ACTIVE | Noted: 2023-04-28

## 2023-04-28 LAB
ANION GAP SERPL CALCULATED.3IONS-SCNC: 9 MMOL/L (ref 7–16)
BASOPHILS # BLD: 0.02 E9/L (ref 0–0.2)
BASOPHILS NFR BLD: 0.2 % (ref 0–2)
BUN SERPL-MCNC: 25 MG/DL (ref 6–23)
CALCIUM SERPL-MCNC: 9.1 MG/DL (ref 8.6–10.2)
CHLORIDE SERPL-SCNC: 104 MMOL/L (ref 98–107)
CO2 SERPL-SCNC: 25 MMOL/L (ref 22–29)
CREAT SERPL-MCNC: 1.3 MG/DL (ref 0.7–1.2)
EOSINOPHIL # BLD: 0.05 E9/L (ref 0.05–0.5)
EOSINOPHIL NFR BLD: 0.5 % (ref 0–6)
ERYTHROCYTE [DISTWIDTH] IN BLOOD BY AUTOMATED COUNT: 13.7 FL (ref 11.5–15)
GLUCOSE SERPL-MCNC: 136 MG/DL (ref 74–99)
HCT VFR BLD AUTO: 37.7 % (ref 37–54)
HGB BLD-MCNC: 13.1 G/DL (ref 12.5–16.5)
IMM GRANULOCYTES # BLD: 0.01 E9/L
IMM GRANULOCYTES NFR BLD: 0.1 % (ref 0–5)
LYMPHOCYTES # BLD: 2.17 E9/L (ref 1.5–4)
LYMPHOCYTES NFR BLD: 23 % (ref 20–42)
MAGNESIUM SERPL-MCNC: 2.2 MG/DL (ref 1.6–2.6)
MCH RBC QN AUTO: 32.8 PG (ref 26–35)
MCHC RBC AUTO-ENTMCNC: 34.7 % (ref 32–34.5)
MCV RBC AUTO: 94.3 FL (ref 80–99.9)
METER GLUCOSE: 169 MG/DL (ref 74–99)
MONOCYTES # BLD: 0.84 E9/L (ref 0.1–0.95)
MONOCYTES NFR BLD: 8.9 % (ref 2–12)
NEUTROPHILS # BLD: 6.36 E9/L (ref 1.8–7.3)
NEUTS SEG NFR BLD: 67.3 % (ref 43–80)
PLATELET # BLD AUTO: 163 E9/L (ref 130–450)
PMV BLD AUTO: 10.5 FL (ref 7–12)
POTASSIUM SERPL-SCNC: 5.4 MMOL/L (ref 3.5–5)
RBC # BLD AUTO: 4 E12/L (ref 3.8–5.8)
SODIUM SERPL-SCNC: 138 MMOL/L (ref 132–146)
WBC # BLD: 9.5 E9/L (ref 4.5–11.5)

## 2023-04-28 PROCEDURE — 71045 X-RAY EXAM CHEST 1 VIEW: CPT

## 2023-04-28 PROCEDURE — 0JH606Z INSERTION OF PACEMAKER, DUAL CHAMBER INTO CHEST SUBCUTANEOUS TISSUE AND FASCIA, OPEN APPROACH: ICD-10-PCS | Performed by: INTERNAL MEDICINE

## 2023-04-28 PROCEDURE — G0378 HOSPITAL OBSERVATION PER HR: HCPCS

## 2023-04-28 PROCEDURE — 2580000003 HC RX 258

## 2023-04-28 PROCEDURE — 80048 BASIC METABOLIC PNL TOTAL CA: CPT

## 2023-04-28 PROCEDURE — 82962 GLUCOSE BLOOD TEST: CPT

## 2023-04-28 PROCEDURE — 6360000002 HC RX W HCPCS

## 2023-04-28 PROCEDURE — 2580000003 HC RX 258: Performed by: NURSE ANESTHETIST, CERTIFIED REGISTERED

## 2023-04-28 PROCEDURE — B51N1ZZ FLUOROSCOPY OF LEFT UPPER EXTREMITY VEINS USING LOW OSMOLAR CONTRAST: ICD-10-PCS | Performed by: INTERNAL MEDICINE

## 2023-04-28 PROCEDURE — 6360000002 HC RX W HCPCS: Performed by: NURSE ANESTHETIST, CERTIFIED REGISTERED

## 2023-04-28 PROCEDURE — 83735 ASSAY OF MAGNESIUM: CPT

## 2023-04-28 PROCEDURE — 6360000002 HC RX W HCPCS: Performed by: INTERNAL MEDICINE

## 2023-04-28 PROCEDURE — C1889 IMPLANT/INSERT DEVICE, NOC: HCPCS

## 2023-04-28 PROCEDURE — 02H63JZ INSERTION OF PACEMAKER LEAD INTO RIGHT ATRIUM, PERCUTANEOUS APPROACH: ICD-10-PCS | Performed by: INTERNAL MEDICINE

## 2023-04-28 PROCEDURE — C1785 PMKR, DUAL, RATE-RESP: HCPCS

## 2023-04-28 PROCEDURE — 6370000000 HC RX 637 (ALT 250 FOR IP): Performed by: INTERNAL MEDICINE

## 2023-04-28 PROCEDURE — S5553 INSULIN LONG ACTING 5 U: HCPCS | Performed by: INTERNAL MEDICINE

## 2023-04-28 PROCEDURE — G0379 DIRECT REFER HOSPITAL OBSERV: HCPCS

## 2023-04-28 PROCEDURE — 2709999900 HC NON-CHARGEABLE SUPPLY

## 2023-04-28 PROCEDURE — 3700000001 HC ADD 15 MINUTES (ANESTHESIA)

## 2023-04-28 PROCEDURE — 33208 INSRT HEART PM ATRIAL & VENT: CPT

## 2023-04-28 PROCEDURE — 2500000003 HC RX 250 WO HCPCS

## 2023-04-28 PROCEDURE — 2580000003 HC RX 258: Performed by: INTERNAL MEDICINE

## 2023-04-28 PROCEDURE — 85025 COMPLETE CBC W/AUTO DIFF WBC: CPT

## 2023-04-28 PROCEDURE — 33208 INSRT HEART PM ATRIAL & VENT: CPT | Performed by: INTERNAL MEDICINE

## 2023-04-28 PROCEDURE — 02HK3JZ INSERTION OF PACEMAKER LEAD INTO RIGHT VENTRICLE, PERCUTANEOUS APPROACH: ICD-10-PCS | Performed by: INTERNAL MEDICINE

## 2023-04-28 PROCEDURE — 99223 1ST HOSP IP/OBS HIGH 75: CPT | Performed by: INTERNAL MEDICINE

## 2023-04-28 PROCEDURE — C1898 LEAD, PMKR, OTHER THAN TRANS: HCPCS

## 2023-04-28 PROCEDURE — 3700000000 HC ANESTHESIA ATTENDED CARE

## 2023-04-28 PROCEDURE — C1894 INTRO/SHEATH, NON-LASER: HCPCS

## 2023-04-28 RX ORDER — TAMSULOSIN HYDROCHLORIDE 0.4 MG/1
0.4 CAPSULE ORAL EVERY EVENING
Status: DISCONTINUED | OUTPATIENT
Start: 2023-04-29 | End: 2023-05-02 | Stop reason: HOSPADM

## 2023-04-28 RX ORDER — ATORVASTATIN CALCIUM 10 MG/1
10 TABLET, FILM COATED ORAL NIGHTLY
Status: DISCONTINUED | OUTPATIENT
Start: 2023-04-28 | End: 2023-05-02 | Stop reason: HOSPADM

## 2023-04-28 RX ORDER — SERTRALINE HYDROCHLORIDE 25 MG/1
75 TABLET, FILM COATED ORAL DAILY
Status: DISCONTINUED | OUTPATIENT
Start: 2023-04-28 | End: 2023-05-02 | Stop reason: HOSPADM

## 2023-04-28 RX ORDER — SODIUM CHLORIDE 9 MG/ML
INJECTION, SOLUTION INTRAVENOUS PRN
Status: DISCONTINUED | OUTPATIENT
Start: 2023-04-28 | End: 2023-05-02 | Stop reason: HOSPADM

## 2023-04-28 RX ORDER — ONDANSETRON 2 MG/ML
4 INJECTION INTRAMUSCULAR; INTRAVENOUS EVERY 6 HOURS PRN
Status: DISCONTINUED | OUTPATIENT
Start: 2023-04-28 | End: 2023-05-02 | Stop reason: HOSPADM

## 2023-04-28 RX ORDER — NIFEDIPINE 30 MG/1
90 TABLET, EXTENDED RELEASE ORAL DAILY
Status: DISCONTINUED | OUTPATIENT
Start: 2023-04-29 | End: 2023-05-02 | Stop reason: HOSPADM

## 2023-04-28 RX ORDER — CLOPIDOGREL BISULFATE 75 MG/1
75 TABLET ORAL DAILY
Status: DISCONTINUED | OUTPATIENT
Start: 2023-04-29 | End: 2023-05-02 | Stop reason: HOSPADM

## 2023-04-28 RX ORDER — POTASSIUM CHLORIDE 750 MG/1
10 TABLET, EXTENDED RELEASE ORAL DAILY
Status: DISCONTINUED | OUTPATIENT
Start: 2023-04-29 | End: 2023-05-02 | Stop reason: HOSPADM

## 2023-04-28 RX ORDER — SODIUM CHLORIDE 0.9 % (FLUSH) 0.9 %
5-40 SYRINGE (ML) INJECTION PRN
Status: DISCONTINUED | OUTPATIENT
Start: 2023-04-28 | End: 2023-05-02 | Stop reason: HOSPADM

## 2023-04-28 RX ORDER — DOCUSATE SODIUM 100 MG/1
100 CAPSULE, LIQUID FILLED ORAL DAILY PRN
Status: DISCONTINUED | OUTPATIENT
Start: 2023-04-28 | End: 2023-05-02 | Stop reason: HOSPADM

## 2023-04-28 RX ORDER — SIMETHICONE 80 MG
80 TABLET,CHEWABLE ORAL EVERY 6 HOURS PRN
Status: DISCONTINUED | OUTPATIENT
Start: 2023-04-28 | End: 2023-05-02 | Stop reason: HOSPADM

## 2023-04-28 RX ORDER — IPRATROPIUM BROMIDE AND ALBUTEROL SULFATE 2.5; .5 MG/3ML; MG/3ML
3 SOLUTION RESPIRATORY (INHALATION) EVERY 6 HOURS PRN
Status: DISCONTINUED | OUTPATIENT
Start: 2023-04-28 | End: 2023-05-02 | Stop reason: HOSPADM

## 2023-04-28 RX ORDER — ASPIRIN 81 MG/1
81 TABLET, CHEWABLE ORAL DAILY
Status: DISCONTINUED | OUTPATIENT
Start: 2023-04-29 | End: 2023-05-02 | Stop reason: HOSPADM

## 2023-04-28 RX ORDER — METOPROLOL SUCCINATE 25 MG/1
25 TABLET, EXTENDED RELEASE ORAL DAILY
Status: DISCONTINUED | OUTPATIENT
Start: 2023-04-28 | End: 2023-04-29

## 2023-04-28 RX ORDER — FENTANYL CITRATE 50 UG/ML
INJECTION, SOLUTION INTRAMUSCULAR; INTRAVENOUS PRN
Status: DISCONTINUED | OUTPATIENT
Start: 2023-04-28 | End: 2023-04-28 | Stop reason: SDUPTHER

## 2023-04-28 RX ORDER — OXYCODONE HYDROCHLORIDE 10 MG/1
10 TABLET ORAL EVERY 4 HOURS PRN
Status: DISCONTINUED | OUTPATIENT
Start: 2023-04-28 | End: 2023-04-29

## 2023-04-28 RX ORDER — ACETAMINOPHEN 325 MG/1
650 TABLET ORAL EVERY 4 HOURS PRN
Status: DISCONTINUED | OUTPATIENT
Start: 2023-04-28 | End: 2023-05-02 | Stop reason: HOSPADM

## 2023-04-28 RX ORDER — LATANOPROST 50 UG/ML
1 SOLUTION/ DROPS OPHTHALMIC NIGHTLY
Status: DISCONTINUED | OUTPATIENT
Start: 2023-04-28 | End: 2023-05-02 | Stop reason: HOSPADM

## 2023-04-28 RX ORDER — SODIUM CHLORIDE 9 MG/ML
INJECTION, SOLUTION INTRAVENOUS ONCE
Status: COMPLETED | OUTPATIENT
Start: 2023-04-28 | End: 2023-04-28

## 2023-04-28 RX ORDER — LISINOPRIL 20 MG/1
20 TABLET ORAL DAILY
Status: DISCONTINUED | OUTPATIENT
Start: 2023-04-29 | End: 2023-04-29

## 2023-04-28 RX ORDER — SODIUM CHLORIDE 9 MG/ML
INJECTION, SOLUTION INTRAVENOUS CONTINUOUS PRN
Status: DISCONTINUED | OUTPATIENT
Start: 2023-04-28 | End: 2023-04-28 | Stop reason: SDUPTHER

## 2023-04-28 RX ORDER — BRIMONIDINE TARTRATE 2 MG/ML
1 SOLUTION/ DROPS OPHTHALMIC 2 TIMES DAILY
Status: DISCONTINUED | OUTPATIENT
Start: 2023-04-28 | End: 2023-05-02 | Stop reason: HOSPADM

## 2023-04-28 RX ORDER — OXYCODONE HYDROCHLORIDE 5 MG/1
5 TABLET ORAL EVERY 4 HOURS PRN
Status: DISCONTINUED | OUTPATIENT
Start: 2023-04-28 | End: 2023-04-29

## 2023-04-28 RX ORDER — SODIUM CHLORIDE 0.9 % (FLUSH) 0.9 %
5-40 SYRINGE (ML) INJECTION EVERY 12 HOURS SCHEDULED
Status: DISCONTINUED | OUTPATIENT
Start: 2023-04-28 | End: 2023-05-02 | Stop reason: HOSPADM

## 2023-04-28 RX ORDER — FERROUS SULFATE 325(65) MG
325 TABLET ORAL 2 TIMES DAILY WITH MEALS
Status: DISCONTINUED | OUTPATIENT
Start: 2023-04-28 | End: 2023-05-02 | Stop reason: HOSPADM

## 2023-04-28 RX ORDER — FUROSEMIDE 20 MG/1
20 TABLET ORAL 2 TIMES DAILY
Status: DISCONTINUED | OUTPATIENT
Start: 2023-04-28 | End: 2023-05-02 | Stop reason: HOSPADM

## 2023-04-28 RX ORDER — HYDRALAZINE HYDROCHLORIDE 50 MG/1
100 TABLET, FILM COATED ORAL 3 TIMES DAILY
Status: DISCONTINUED | OUTPATIENT
Start: 2023-04-28 | End: 2023-05-02 | Stop reason: HOSPADM

## 2023-04-28 RX ORDER — METOPROLOL SUCCINATE 25 MG/1
25 TABLET, EXTENDED RELEASE ORAL DAILY
Qty: 90 TABLET | Refills: 3 | Status: SHIPPED | OUTPATIENT
Start: 2023-04-28

## 2023-04-28 RX ORDER — INSULIN GLARGINE-YFGN 100 [IU]/ML
12 INJECTION, SOLUTION SUBCUTANEOUS 2 TIMES DAILY
Status: DISCONTINUED | OUTPATIENT
Start: 2023-04-28 | End: 2023-05-02 | Stop reason: HOSPADM

## 2023-04-28 RX ORDER — PROPOFOL 10 MG/ML
INJECTION, EMULSION INTRAVENOUS CONTINUOUS PRN
Status: DISCONTINUED | OUTPATIENT
Start: 2023-04-28 | End: 2023-04-28 | Stop reason: SDUPTHER

## 2023-04-28 RX ORDER — CEFAZOLIN SODIUM 1 G/3ML
INJECTION, POWDER, FOR SOLUTION INTRAMUSCULAR; INTRAVENOUS PRN
Status: DISCONTINUED | OUTPATIENT
Start: 2023-04-28 | End: 2023-04-28 | Stop reason: SDUPTHER

## 2023-04-28 RX ADMIN — FENTANYL CITRATE 25 MCG: 50 INJECTION, SOLUTION INTRAMUSCULAR; INTRAVENOUS at 13:17

## 2023-04-28 RX ADMIN — CEFAZOLIN 2000 MG: 2 INJECTION, POWDER, FOR SOLUTION INTRAMUSCULAR; INTRAVENOUS at 20:37

## 2023-04-28 RX ADMIN — METOPROLOL SUCCINATE 25 MG: 25 TABLET, EXTENDED RELEASE ORAL at 18:42

## 2023-04-28 RX ADMIN — FUROSEMIDE 20 MG: 20 TABLET ORAL at 20:37

## 2023-04-28 RX ADMIN — ATORVASTATIN CALCIUM 10 MG: 10 TABLET, FILM COATED ORAL at 20:37

## 2023-04-28 RX ADMIN — CEFAZOLIN 2 G: 1 INJECTION, POWDER, FOR SOLUTION INTRAMUSCULAR; INTRAVENOUS at 13:09

## 2023-04-28 RX ADMIN — SODIUM CHLORIDE: 9 INJECTION, SOLUTION INTRAVENOUS at 12:59

## 2023-04-28 RX ADMIN — SODIUM CHLORIDE: 9 INJECTION, SOLUTION INTRAVENOUS at 12:23

## 2023-04-28 RX ADMIN — SODIUM CHLORIDE: 9 INJECTION, SOLUTION INTRAVENOUS at 12:24

## 2023-04-28 RX ADMIN — FERROUS SULFATE TAB 325 MG (65 MG ELEMENTAL FE) 325 MG: 325 (65 FE) TAB at 18:42

## 2023-04-28 RX ADMIN — INSULIN GLARGINE-YFGN 12 UNITS: 100 INJECTION, SOLUTION SUBCUTANEOUS at 20:46

## 2023-04-28 RX ADMIN — HYDRALAZINE HYDROCHLORIDE 100 MG: 50 TABLET, FILM COATED ORAL at 20:37

## 2023-04-28 RX ADMIN — Medication 10 ML: at 20:38

## 2023-04-28 RX ADMIN — FENTANYL CITRATE 25 MCG: 50 INJECTION, SOLUTION INTRAMUSCULAR; INTRAVENOUS at 13:12

## 2023-04-28 RX ADMIN — PROPOFOL 50 MCG/KG/MIN: 10 INJECTION, EMULSION INTRAVENOUS at 13:09

## 2023-04-28 NOTE — ANESTHESIA POSTPROCEDURE EVALUATION
Department of Anesthesiology  Postprocedure Note    Patient: Casandra Anderson  MRN: [id number]  YOB: 1940  Date of evaluation: 4/28/2023      Procedure Summary     Date: 04/28/23 Room / Location: [facility] CATH LAB    Anesthesia Start: 9634 Anesthesia Stop: 5068    Procedure: PACEMAKER IMPLANT W/ANES Diagnosis:       Essential (primary) hypertension      Palpitations      Bradycardia      S/P placement of cardiac pacemaker    Scheduled Providers: RONNI Chapman - CRNA; Ganesh Campbell MD Responsible Provider: Ganesh Campbell MD    Anesthesia Type: MAC ASA Status: 4          Anesthesia Type: No value filed.     Malena Phase I:      Malena Phase II:        Anesthesia Post Evaluation    Patient location during evaluation: PACU  Patient participation: complete - patient participated  Level of consciousness: awake and alert  Airway patency: patent  Nausea & Vomiting: no nausea and no vomiting  Complications: no  Cardiovascular status: blood pressure returned to baseline and hemodynamically stable  Respiratory status: acceptable and spontaneous ventilation  Hydration status: euvolemic  Multimodal analgesia pain management approach

## 2023-04-28 NOTE — ANESTHESIA PRE PROCEDURE
Department of Anesthesiology  Preprocedure Note       Name:  Minnie Mendiola   Age:  81 y.o.  :  1940                                          MRN:  [id number]         Date:  2023      Surgeon: Jasiel Tamayo    Procedure: VENOGRAM, IMPLANT PPM    Medications prior to admission:   Prior to Admission medications    Medication Sig Start Date End Date Taking? Authorizing Provider   Insulin Glargine-yfgn 100 UNIT/ML SOPN INJECT 12 UNITS SUBCUTANEOUSLY TWICE A DAY FOR DIABETES 3/7/23   Historical Provider, MD   lisinopril (PRINIVIL;ZESTRIL) 20 MG tablet Take 1 tablet by mouth daily 23   Radha Mcclain DO   NIFEdipine (ADALAT CC) 30 MG extended release tablet Take 3 tablets by mouth daily 23   Radha Mcclain DO   hydrALAZINE (APRESOLINE) 100 MG tablet Take 1 tablet by mouth 3 times daily 23   Radha Mcclain DO   sertraline (ZOLOFT) 25 MG tablet Take 3 tablets by mouth 9/15/21   Historical Provider, MD   brinzolamide-brimonidine 1-0.2 % SUSP Apply to eye 22   Historical Provider, MD   clopidogrel (PLAVIX) 75 MG tablet Take 1 tablet by mouth daily    Historical Provider, MD   furosemide (LASIX) 20 MG tablet Take 1 tablet by mouth 2 times daily    Historical Provider, MD   potassium chloride (KLOR-CON) 10 MEQ extended release tablet Take 1 tablet by mouth daily    Historical Provider, MD   aspirin 81 MG chewable tablet Take 1 tablet by mouth daily 3/5/21   Miguelina Fernandez MD   NONFORMULARY Omega XL, contains  Green lipped mussel oil extract, EPA, DHA, ETA, and HORTENCIA    Historical Provider, MD   Omega 3-6-9 Fatty Acids (TRIPLE OMEGA-3-6-9 PO) Take 1 tablet by mouth 2 times daily.     Historical Provider, MD   latanoprost (XALATAN) 0.005 % ophthalmic solution Place 1 drop into the left eye nightly    Historical Provider, MD   simethicone (MYLICON) 80 MG chewable tablet Take 1 tablet by mouth every 6 hours as needed for Flatulence    Historical Provider, MD   brimonidine (ALPHAGAN) 0.2 % ophthalmic solution

## 2023-04-29 ENCOUNTER — APPOINTMENT (OUTPATIENT)
Dept: GENERAL RADIOLOGY | Age: 83
DRG: 243 | End: 2023-04-29
Attending: INTERNAL MEDICINE
Payer: OTHER GOVERNMENT

## 2023-04-29 LAB
ANION GAP SERPL CALCULATED.3IONS-SCNC: 10 MMOL/L (ref 7–16)
BUN SERPL-MCNC: 21 MG/DL (ref 6–23)
CALCIUM SERPL-MCNC: 8.5 MG/DL (ref 8.6–10.2)
CHLORIDE SERPL-SCNC: 103 MMOL/L (ref 98–107)
CO2 SERPL-SCNC: 25 MMOL/L (ref 22–29)
CREAT SERPL-MCNC: 1.3 MG/DL (ref 0.7–1.2)
ERYTHROCYTE [DISTWIDTH] IN BLOOD BY AUTOMATED COUNT: 13.8 FL (ref 11.5–15)
GLUCOSE SERPL-MCNC: 85 MG/DL (ref 74–99)
HCT VFR BLD AUTO: 35.4 % (ref 37–54)
HGB BLD-MCNC: 11.7 G/DL (ref 12.5–16.5)
MAGNESIUM SERPL-MCNC: 2.2 MG/DL (ref 1.6–2.6)
MCH RBC QN AUTO: 32.6 PG (ref 26–35)
MCHC RBC AUTO-ENTMCNC: 33.1 % (ref 32–34.5)
MCV RBC AUTO: 98.6 FL (ref 80–99.9)
METER GLUCOSE: 172 MG/DL (ref 74–99)
METER GLUCOSE: 90 MG/DL (ref 74–99)
PLATELET # BLD AUTO: 146 E9/L (ref 130–450)
PMV BLD AUTO: 10.5 FL (ref 7–12)
POTASSIUM SERPL-SCNC: 4.5 MMOL/L (ref 3.5–5)
RBC # BLD AUTO: 3.59 E12/L (ref 3.8–5.8)
SODIUM SERPL-SCNC: 138 MMOL/L (ref 132–146)
WBC # BLD: 8.1 E9/L (ref 4.5–11.5)

## 2023-04-29 PROCEDURE — G0378 HOSPITAL OBSERVATION PER HR: HCPCS

## 2023-04-29 PROCEDURE — 80048 BASIC METABOLIC PNL TOTAL CA: CPT

## 2023-04-29 PROCEDURE — S5553 INSULIN LONG ACTING 5 U: HCPCS | Performed by: INTERNAL MEDICINE

## 2023-04-29 PROCEDURE — 71045 X-RAY EXAM CHEST 1 VIEW: CPT

## 2023-04-29 PROCEDURE — 36415 COLL VENOUS BLD VENIPUNCTURE: CPT

## 2023-04-29 PROCEDURE — 6370000000 HC RX 637 (ALT 250 FOR IP): Performed by: INTERNAL MEDICINE

## 2023-04-29 PROCEDURE — 2580000003 HC RX 258: Performed by: INTERNAL MEDICINE

## 2023-04-29 PROCEDURE — 6360000002 HC RX W HCPCS: Performed by: INTERNAL MEDICINE

## 2023-04-29 PROCEDURE — 99232 SBSQ HOSP IP/OBS MODERATE 35: CPT | Performed by: NURSE PRACTITIONER

## 2023-04-29 PROCEDURE — 6370000000 HC RX 637 (ALT 250 FOR IP): Performed by: NURSE PRACTITIONER

## 2023-04-29 PROCEDURE — 85027 COMPLETE CBC AUTOMATED: CPT

## 2023-04-29 PROCEDURE — 83735 ASSAY OF MAGNESIUM: CPT

## 2023-04-29 PROCEDURE — 82962 GLUCOSE BLOOD TEST: CPT

## 2023-04-29 RX ORDER — METOPROLOL SUCCINATE 25 MG/1
25 TABLET, EXTENDED RELEASE ORAL 2 TIMES DAILY
Status: DISCONTINUED | OUTPATIENT
Start: 2023-04-29 | End: 2023-05-02 | Stop reason: HOSPADM

## 2023-04-29 RX ORDER — LISINOPRIL 20 MG/1
20 TABLET ORAL ONCE
Status: COMPLETED | OUTPATIENT
Start: 2023-04-29 | End: 2023-04-29

## 2023-04-29 RX ORDER — LISINOPRIL 20 MG/1
40 TABLET ORAL DAILY
Status: DISCONTINUED | OUTPATIENT
Start: 2023-04-30 | End: 2023-04-29

## 2023-04-29 RX ORDER — LISINOPRIL 10 MG/1
30 TABLET ORAL DAILY
Status: DISCONTINUED | OUTPATIENT
Start: 2023-04-30 | End: 2023-05-02 | Stop reason: HOSPADM

## 2023-04-29 RX ADMIN — HYDRALAZINE HYDROCHLORIDE 100 MG: 50 TABLET, FILM COATED ORAL at 09:21

## 2023-04-29 RX ADMIN — OXYCODONE HYDROCHLORIDE 10 MG: 10 TABLET ORAL at 01:40

## 2023-04-29 RX ADMIN — LATANOPROST 1 DROP: 50 SOLUTION OPHTHALMIC at 22:24

## 2023-04-29 RX ADMIN — ATORVASTATIN CALCIUM 10 MG: 10 TABLET, FILM COATED ORAL at 22:25

## 2023-04-29 RX ADMIN — FUROSEMIDE 20 MG: 20 TABLET ORAL at 09:21

## 2023-04-29 RX ADMIN — CLOPIDOGREL BISULFATE 75 MG: 75 TABLET ORAL at 09:21

## 2023-04-29 RX ADMIN — TAMSULOSIN HYDROCHLORIDE 0.4 MG: 0.4 CAPSULE ORAL at 18:39

## 2023-04-29 RX ADMIN — LISINOPRIL 20 MG: 20 TABLET ORAL at 09:21

## 2023-04-29 RX ADMIN — Medication 10 ML: at 09:22

## 2023-04-29 RX ADMIN — BRIMONIDINE TARTRATE 1 DROP: 2 SOLUTION/ DROPS OPHTHALMIC at 18:41

## 2023-04-29 RX ADMIN — LISINOPRIL 20 MG: 20 TABLET ORAL at 13:44

## 2023-04-29 RX ADMIN — FUROSEMIDE 20 MG: 20 TABLET ORAL at 22:24

## 2023-04-29 RX ADMIN — POTASSIUM CHLORIDE 10 MEQ: 750 TABLET, EXTENDED RELEASE ORAL at 09:21

## 2023-04-29 RX ADMIN — METOPROLOL SUCCINATE 25 MG: 25 TABLET, EXTENDED RELEASE ORAL at 09:21

## 2023-04-29 RX ADMIN — METOPROLOL SUCCINATE 25 MG: 25 TABLET, EXTENDED RELEASE ORAL at 22:25

## 2023-04-29 RX ADMIN — SERTRALINE 75 MG: 25 TABLET, FILM COATED ORAL at 09:20

## 2023-04-29 RX ADMIN — HYDRALAZINE HYDROCHLORIDE 100 MG: 50 TABLET, FILM COATED ORAL at 13:44

## 2023-04-29 RX ADMIN — NIFEDIPINE 90 MG: 30 TABLET, EXTENDED RELEASE ORAL at 09:21

## 2023-04-29 RX ADMIN — BRIMONIDINE TARTRATE 1 DROP: 2 SOLUTION/ DROPS OPHTHALMIC at 09:28

## 2023-04-29 RX ADMIN — FERROUS SULFATE TAB 325 MG (65 MG ELEMENTAL FE) 325 MG: 325 (65 FE) TAB at 09:20

## 2023-04-29 RX ADMIN — FERROUS SULFATE TAB 325 MG (65 MG ELEMENTAL FE) 325 MG: 325 (65 FE) TAB at 18:39

## 2023-04-29 RX ADMIN — CEFAZOLIN 2000 MG: 2 INJECTION, POWDER, FOR SOLUTION INTRAMUSCULAR; INTRAVENOUS at 06:03

## 2023-04-29 RX ADMIN — Medication 10 ML: at 22:36

## 2023-04-29 RX ADMIN — HYDRALAZINE HYDROCHLORIDE 100 MG: 50 TABLET, FILM COATED ORAL at 22:25

## 2023-04-29 RX ADMIN — INSULIN GLARGINE-YFGN 12 UNITS: 100 INJECTION, SOLUTION SUBCUTANEOUS at 22:24

## 2023-04-29 RX ADMIN — INSULIN GLARGINE-YFGN 12 UNITS: 100 INJECTION, SOLUTION SUBCUTANEOUS at 09:22

## 2023-04-29 RX ADMIN — ASPIRIN 81 MG 81 MG: 81 TABLET ORAL at 09:21

## 2023-04-29 ASSESSMENT — PAIN SCALES - GENERAL: PAINLEVEL_OUTOF10: 8

## 2023-04-29 ASSESSMENT — PAIN DESCRIPTION - LOCATION: LOCATION: ARM;SHOULDER

## 2023-04-29 ASSESSMENT — PAIN DESCRIPTION - ORIENTATION: ORIENTATION: LEFT

## 2023-04-30 PROBLEM — R53.1 WEAKNESS: Status: ACTIVE | Noted: 2023-04-30

## 2023-04-30 LAB
METER GLUCOSE: 92 MG/DL (ref 74–99)
METER GLUCOSE: 98 MG/DL (ref 74–99)

## 2023-04-30 PROCEDURE — 2580000003 HC RX 258: Performed by: INTERNAL MEDICINE

## 2023-04-30 PROCEDURE — S5553 INSULIN LONG ACTING 5 U: HCPCS | Performed by: INTERNAL MEDICINE

## 2023-04-30 PROCEDURE — 6370000000 HC RX 637 (ALT 250 FOR IP): Performed by: NURSE PRACTITIONER

## 2023-04-30 PROCEDURE — 6370000000 HC RX 637 (ALT 250 FOR IP): Performed by: INTERNAL MEDICINE

## 2023-04-30 PROCEDURE — 99232 SBSQ HOSP IP/OBS MODERATE 35: CPT | Performed by: NURSE PRACTITIONER

## 2023-04-30 PROCEDURE — 2140000000 HC CCU INTERMEDIATE R&B

## 2023-04-30 PROCEDURE — 82962 GLUCOSE BLOOD TEST: CPT

## 2023-04-30 RX ADMIN — FUROSEMIDE 20 MG: 20 TABLET ORAL at 08:20

## 2023-04-30 RX ADMIN — Medication 10 ML: at 20:24

## 2023-04-30 RX ADMIN — ASPIRIN 81 MG 81 MG: 81 TABLET ORAL at 08:20

## 2023-04-30 RX ADMIN — HYDRALAZINE HYDROCHLORIDE 100 MG: 50 TABLET, FILM COATED ORAL at 20:24

## 2023-04-30 RX ADMIN — FERROUS SULFATE TAB 325 MG (65 MG ELEMENTAL FE) 325 MG: 325 (65 FE) TAB at 08:31

## 2023-04-30 RX ADMIN — TAMSULOSIN HYDROCHLORIDE 0.4 MG: 0.4 CAPSULE ORAL at 17:30

## 2023-04-30 RX ADMIN — CLOPIDOGREL BISULFATE 75 MG: 75 TABLET ORAL at 08:19

## 2023-04-30 RX ADMIN — FUROSEMIDE 20 MG: 20 TABLET ORAL at 20:24

## 2023-04-30 RX ADMIN — BRIMONIDINE TARTRATE 1 DROP: 2 SOLUTION/ DROPS OPHTHALMIC at 08:21

## 2023-04-30 RX ADMIN — METOPROLOL SUCCINATE 25 MG: 25 TABLET, EXTENDED RELEASE ORAL at 20:24

## 2023-04-30 RX ADMIN — HYDRALAZINE HYDROCHLORIDE 100 MG: 50 TABLET, FILM COATED ORAL at 08:20

## 2023-04-30 RX ADMIN — LISINOPRIL 30 MG: 10 TABLET ORAL at 08:18

## 2023-04-30 RX ADMIN — POTASSIUM CHLORIDE 10 MEQ: 750 TABLET, EXTENDED RELEASE ORAL at 08:19

## 2023-04-30 RX ADMIN — HYDRALAZINE HYDROCHLORIDE 100 MG: 50 TABLET, FILM COATED ORAL at 14:56

## 2023-04-30 RX ADMIN — INSULIN GLARGINE-YFGN 12 UNITS: 100 INJECTION, SOLUTION SUBCUTANEOUS at 08:26

## 2023-04-30 RX ADMIN — LATANOPROST 1 DROP: 50 SOLUTION OPHTHALMIC at 20:26

## 2023-04-30 RX ADMIN — FERROUS SULFATE TAB 325 MG (65 MG ELEMENTAL FE) 325 MG: 325 (65 FE) TAB at 17:30

## 2023-04-30 RX ADMIN — Medication 10 ML: at 08:20

## 2023-04-30 RX ADMIN — NIFEDIPINE 90 MG: 30 TABLET, EXTENDED RELEASE ORAL at 08:19

## 2023-04-30 RX ADMIN — SERTRALINE 75 MG: 25 TABLET, FILM COATED ORAL at 08:19

## 2023-04-30 RX ADMIN — BRIMONIDINE TARTRATE 1 DROP: 2 SOLUTION/ DROPS OPHTHALMIC at 17:31

## 2023-04-30 RX ADMIN — METOPROLOL SUCCINATE 25 MG: 25 TABLET, EXTENDED RELEASE ORAL at 08:20

## 2023-04-30 RX ADMIN — ATORVASTATIN CALCIUM 10 MG: 10 TABLET, FILM COATED ORAL at 20:24

## 2023-05-01 ENCOUNTER — TELEPHONE (OUTPATIENT)
Dept: NON INVASIVE DIAGNOSTICS | Age: 83
End: 2023-05-01

## 2023-05-01 VITALS
BODY MASS INDEX: 28 KG/M2 | TEMPERATURE: 98.2 F | OXYGEN SATURATION: 91 % | WEIGHT: 200 LBS | HEIGHT: 71 IN | RESPIRATION RATE: 18 BRPM | DIASTOLIC BLOOD PRESSURE: 88 MMHG | HEART RATE: 66 BPM | SYSTOLIC BLOOD PRESSURE: 169 MMHG

## 2023-05-01 LAB
ANION GAP SERPL CALCULATED.3IONS-SCNC: 8 MMOL/L (ref 7–16)
BUN SERPL-MCNC: 30 MG/DL (ref 6–23)
CALCIUM SERPL-MCNC: 8.2 MG/DL (ref 8.6–10.2)
CHLORIDE SERPL-SCNC: 104 MMOL/L (ref 98–107)
CO2 SERPL-SCNC: 26 MMOL/L (ref 22–29)
CREAT SERPL-MCNC: 1.4 MG/DL (ref 0.7–1.2)
GLUCOSE SERPL-MCNC: 126 MG/DL (ref 74–99)
MAGNESIUM SERPL-MCNC: 2 MG/DL (ref 1.6–2.6)
METER GLUCOSE: 182 MG/DL (ref 74–99)
POTASSIUM SERPL-SCNC: 4.2 MMOL/L (ref 3.5–5)
SODIUM SERPL-SCNC: 138 MMOL/L (ref 132–146)

## 2023-05-01 PROCEDURE — 82962 GLUCOSE BLOOD TEST: CPT

## 2023-05-01 PROCEDURE — 97165 OT EVAL LOW COMPLEX 30 MIN: CPT

## 2023-05-01 PROCEDURE — 97535 SELF CARE MNGMENT TRAINING: CPT

## 2023-05-01 PROCEDURE — 36415 COLL VENOUS BLD VENIPUNCTURE: CPT

## 2023-05-01 PROCEDURE — 97530 THERAPEUTIC ACTIVITIES: CPT

## 2023-05-01 PROCEDURE — 97161 PT EVAL LOW COMPLEX 20 MIN: CPT

## 2023-05-01 PROCEDURE — 83735 ASSAY OF MAGNESIUM: CPT

## 2023-05-01 PROCEDURE — S5553 INSULIN LONG ACTING 5 U: HCPCS | Performed by: INTERNAL MEDICINE

## 2023-05-01 PROCEDURE — 2580000003 HC RX 258: Performed by: INTERNAL MEDICINE

## 2023-05-01 PROCEDURE — 6370000000 HC RX 637 (ALT 250 FOR IP): Performed by: NURSE PRACTITIONER

## 2023-05-01 PROCEDURE — 80048 BASIC METABOLIC PNL TOTAL CA: CPT

## 2023-05-01 PROCEDURE — 2140000000 HC CCU INTERMEDIATE R&B

## 2023-05-01 PROCEDURE — 6370000000 HC RX 637 (ALT 250 FOR IP): Performed by: INTERNAL MEDICINE

## 2023-05-01 PROCEDURE — 99238 HOSP IP/OBS DSCHRG MGMT 30/<: CPT | Performed by: NURSE PRACTITIONER

## 2023-05-01 RX ORDER — LISINOPRIL 30 MG/1
30 TABLET ORAL DAILY
Qty: 30 TABLET | Refills: 3 | Status: SHIPPED | OUTPATIENT
Start: 2023-05-02

## 2023-05-01 RX ADMIN — LISINOPRIL 30 MG: 10 TABLET ORAL at 10:16

## 2023-05-01 RX ADMIN — ASPIRIN 81 MG 81 MG: 81 TABLET ORAL at 10:15

## 2023-05-01 RX ADMIN — ATORVASTATIN CALCIUM 10 MG: 10 TABLET, FILM COATED ORAL at 22:10

## 2023-05-01 RX ADMIN — SERTRALINE 75 MG: 25 TABLET, FILM COATED ORAL at 10:19

## 2023-05-01 RX ADMIN — HYDRALAZINE HYDROCHLORIDE 100 MG: 50 TABLET, FILM COATED ORAL at 22:10

## 2023-05-01 RX ADMIN — CLOPIDOGREL BISULFATE 75 MG: 75 TABLET ORAL at 10:16

## 2023-05-01 RX ADMIN — NIFEDIPINE 90 MG: 30 TABLET, EXTENDED RELEASE ORAL at 10:20

## 2023-05-01 RX ADMIN — TAMSULOSIN HYDROCHLORIDE 0.4 MG: 0.4 CAPSULE ORAL at 17:57

## 2023-05-01 RX ADMIN — FUROSEMIDE 20 MG: 20 TABLET ORAL at 10:16

## 2023-05-01 RX ADMIN — BRIMONIDINE TARTRATE 1 DROP: 2 SOLUTION/ DROPS OPHTHALMIC at 17:55

## 2023-05-01 RX ADMIN — METOPROLOL SUCCINATE 25 MG: 25 TABLET, EXTENDED RELEASE ORAL at 10:17

## 2023-05-01 RX ADMIN — BRIMONIDINE TARTRATE 1 DROP: 2 SOLUTION/ DROPS OPHTHALMIC at 10:29

## 2023-05-01 RX ADMIN — METOPROLOL SUCCINATE 25 MG: 25 TABLET, EXTENDED RELEASE ORAL at 22:10

## 2023-05-01 RX ADMIN — HYDRALAZINE HYDROCHLORIDE 100 MG: 50 TABLET, FILM COATED ORAL at 14:15

## 2023-05-01 RX ADMIN — POTASSIUM CHLORIDE 10 MEQ: 750 TABLET, EXTENDED RELEASE ORAL at 10:16

## 2023-05-01 RX ADMIN — Medication 10 ML: at 10:23

## 2023-05-01 RX ADMIN — INSULIN GLARGINE-YFGN 12 UNITS: 100 INJECTION, SOLUTION SUBCUTANEOUS at 10:25

## 2023-05-01 RX ADMIN — FERROUS SULFATE TAB 325 MG (65 MG ELEMENTAL FE) 325 MG: 325 (65 FE) TAB at 17:57

## 2023-05-01 RX ADMIN — HYDRALAZINE HYDROCHLORIDE 100 MG: 50 TABLET, FILM COATED ORAL at 10:15

## 2023-05-01 RX ADMIN — FERROUS SULFATE TAB 325 MG (65 MG ELEMENTAL FE) 325 MG: 325 (65 FE) TAB at 10:15

## 2023-05-01 NOTE — PROGRESS NOTES
[address]      Date:2023                                                  Patient Name: Shelly Cabrera  MRN: [id number]  : 1940  Room: [address] [place] [address]    Evaluating OT: WANDA Washington, OTR/L  # [id number]    Referring Provider:  RONNI Herrmann CNP  Specific Provider Orders:  Tia Arciniega and Treat\"  23    Diagnosis: Essential (primary) hypertension [I10]  Palpitations [R00.2]  Bradycardia [R00.1]  S/P placement of cardiac pacemaker [Z95.0]  Complete AV block ([address] [I44.2]    Pt was admitted for pre-scheduled Placement of Pacemaker. Pertinent Medical History:  Pt has a past medical history of Asthma, Blind right eye, CAD (coronary artery disease), CHF (congestive heart failure) ([address] Chronic respiratory failure ([address] CKD (chronic kidney disease) stage 3, GFR 30-59 ml/min ([address] Diabetes mellitus ([address] Diabetes mellitus type 2, controlled ([address] HLD (hyperlipidemia), Hypertension, and TIA (transient ischemic attack). ,  has a past surgical history that includes Coronary artery bypass graft (); Eye surgery; ECHO Compl W Dop Color Flow (2013); ECHO Compl W Dop Color Flow (2013); Cardiac surgery; Insert Picc Cath, 5/> Yrs (2021); laparoscopy (N/A, 2021); and Pacemaker insertion (2023). Surgeries this admission: 23:  1. Insertion of Dual Chamber Permanent Pacemaker (Medtronic- MRI conditional), 2. Left upper extremity venography, 3. Fluoroscopy       Precautions:  Fall Risk  Pacemaker Precautions:   \"Do not elevate affected arm above shoulder for 30 days, Keep wound clean and dry, No showering for one week\"  Blind Right Eye, Low Vision L Eye  [place]      Assessment of current deficits   [x] Functional mobility  [x]ADLs  [x] Strength               []Cognition   [x] Functional transfers   [x] IADLs         [x] Safety

## 2023-05-01 NOTE — PLAN OF CARE
Patient's chart updated to reflect:      . - HF care plan, HF education points and HF discharge instructions.  -Orders: 2 gram sodium diet, daily weights, I/O.  -PCP and/or Cardiologist appointment to be scheduled within 7 days of hospital discharge.  -History of HF, not primary admission Dx. Patient admitted for treatment of Procedure Performed:  1. Insertion of Dual Chamber Permanent Pacemaker (Medtronic- MRI conditional)  2. Left upper extremity venography  3.  Fluoroscopy     Analilia Valdivia RN RN, BSN  Heart Failure Navigator

## 2023-05-01 NOTE — CARE COORDINATION
Patient presented to the hospital for elective dual chamber pacemaker implantation; admitted for complete AV block. Met with patient at bedside for transition of care planning. Patient reports he lives alone in a 2-story home, he is independent without a device and uses oxygen at home (only at night) through the [place]; no DME reported. PCP is Dr. Flori Arrington at [facility] and uses [place] in [place] for scripts. Patient reports he does not use his stove at home, mainly uses [employer] for transport and sometimes uses [employer]. Offered home care services and patient declined. Inquired if patient would like family called, he states no because he does not want anyone in his home while he is away. Patient states he will be able to manage at home, repeated pacer precautions back to  and states he is mainly up in a chair and watches tv most of the day. SPC recommended by PT; patient has no preference on DME. Called [facility] regarding patient needing a SPC, advised they will put a note in the system but it may not be read for 3 days, so it may be a while before patient receives it. Call forwarded to patient's PCP, no answer; left message regarding the call. Met with patient again at bedside to provide update regarding the [facility]; patient agreeable to private pay for it. Referral made to [facility] DME and to be delivered to patient's room. Patient states he will take [employer] home. The Plan for Transition of Care is related to the following treatment goals: discharge planning    The Patient and/or patient representative Ariadne Patel was provided with a choice of provider and agrees   with the discharge plan. [x] Yes [] No    Freedom of choice list was provided with basic dialogue that supports the patient's individualized plan of care/goals, treatment preferences and shares the quality data associated with the providers.  [x] Yes [] No     Domingo Mariee, TEDDY, [address] [phone]

## 2023-05-01 NOTE — TELEPHONE ENCOUNTER
Lolly from PCP office notified and progress notes faxed.      Electronically signed by Beth Garcia MA on 5/1/2023 at 1:42 PM

## 2023-05-01 NOTE — TELEPHONE ENCOUNTER
----- Message from RONNI Royal CNP sent at 4/29/2023 12:58 PM EDT -----  Please let the PCP known his blood pressure was running in the 170's in the hospital and his dose of Lisinopril was increased to 40mg daily from 20mg daily.

## 2023-05-01 NOTE — PROGRESS NOTES
Physical Therapy    Physical Therapy Initial Assessment     Name: Pancho Spear  : 1940  MRN: [id number]      Date of Service: 2023    Evaluating PT:  Zuri MckeonCHARLENE, DPROSEMARY  [id number]     Room #:  5999/8550-F  Diagnosis:  Essential (primary) hypertension [I10]  Palpitations [R00.2]  Bradycardia [R00.1]  S/P placement of cardiac pacemaker [Z95.0]  Complete AV block ([address] [I44.2]  PMHx/PSHx:   has a past medical history of Asthma, Blind right eye, CAD (coronary artery disease), CHF (congestive heart failure) ([facility]), Chronic respiratory failure ([address] CKD (chronic kidney disease) stage 3, GFR 30-59 ml/min ([address] Diabetes mellitus ([address] Diabetes mellitus type 2, controlled ([address] HLD (hyperlipidemia), Hypertension, and TIA (transient ischemic attack). Procedure/Surgery:   Insertion of dual chamber permanent pacemaker, LUE venography, fluoroscopy    Precautions:  Falls, Blind R eye, Legally blind L eye, Pacemaker precautions       Equipment Needs:  SPC    SUBJECTIVE:    Pt lives alone in a 2 story home with 2 stairs with 1 rail to enter. Bedroom and bathroom are on the 2nd level with full flight and 1 rail. Pt ambulated with no AD PTA. Does not own any assistive equipment at this time. OBJECTIVE:   Initial Evaluation  Date: 23 Treatment Short Term/ Long Term   Goals   AM-PAC 6 Clicks 91/12     Was pt agreeable to Eval/treatment? Yes      Does pt have pain? No c/o pain     Bed Mobility  Rolling: SBA  Supine to sit: SBA  Sit to supine: NT  Scooting: SBA  Rolling: Independent   Supine to sit: Independent   Sit to supine: Independent   Scooting: Independent    Transfers Sit to stand: SBA  Stand to sit: SBA  Stand pivot: SBA with SPC  Sit to stand: Independent   Stand to sit:  Independent   Stand pivot: Modified Independent with SPC   Ambulation    90 feet with SPC SBA  >300 feet with SPC Modified Independent     Stair negotiation: ascended and descended  NT  >4 steps with 1 rail Modified

## 2023-05-01 NOTE — DISCHARGE SUMMARY
[address] [place] Electrophysiology  Discharge Summary  Patient: Sonu Mclean Record Number: [id number]  Date of Procedure:  5/1/2023  Operating Electrophysiologist: Finesse Dobson MD  Primary Electrophysiologist: Finesse Dobson MD   Referring Physician: No primary care provider on file. Admission Date:4/28/2023      Discharge Date:  05/01/2023    Patient Active Problem List   Diagnosis    COPD exacerbation ([facility]    Chest pain    Hypertension    Coronary artery disease    Diabetes mellitus type 2, controlled ([facility]    Hyperlipidemia    BPH (benign prostatic hyperplasia)    Hyperkalemia    Blind right eye    CKD (chronic kidney disease) stage 3, GFR 30-59 ml/min ([facility])    CAD (coronary artery disease)    Acute decompensated heart failure (HCC)    Chronic respiratory failure (HCC)    EMELY (acute kidney injury) ([facility]    SBO (small bowel obstruction) ([facility]    Cerebrovascular accident (CVA) ([facility]    Hypernatremia    Uncontrolled type 2 diabetes mellitus with hyperglycemia (HCC)    Thrombocytopenia (HCC)    Essential hypertension    Hypertensive urgency    AV block, Mobitz 1    Acute on chronic heart failure with preserved ejection fraction (HCC)    Complete AV block (HCC)    Bradycardia    Pacemaker    Weakness          Medication List        START taking these medications      metoprolol succinate 25 MG extended release tablet  Commonly known as: TOPROL XL  Take 1 tablet by mouth daily            CONTINUE taking these medications      aspirin 81 MG chewable tablet  Take 1 tablet by mouth daily     brimonidine 0.2 % ophthalmic solution  Commonly known as: ALPHAGAN     brinzolamide-brimonidine 1-0.2 % Susp     clopidogrel 75 MG tablet  Commonly known as: PLAVIX     docusate sodium 100 MG capsule  Commonly known as: COLACE     ferrous sulfate 325 (65 Fe) MG tablet  Commonly known as: IRON 325  Take 1 tablet by mouth 2 times daily (with meals).

## 2023-05-05 ENCOUNTER — TELEPHONE (OUTPATIENT)
Dept: CARDIAC CATH/INVASIVE PROCEDURES | Age: 83
End: 2023-05-05

## 2023-05-05 NOTE — TELEPHONE ENCOUNTER
I called Mr. Whitney Rojas to see how he's doing since his PPM insertion on 4/28/23. There was no answer. I left a message reminding him to report any fevers, redness, bleeding, drainage, or swelling from PPM incision site. I reminded him to remove the aquacel dsg today , not to touch the steri-strips, & to continue to wear his sling at night for 4 weeks; along with not abducting the L arm above the shoulder. He's also aware of [facility] on 5/12/23 at 8:30 am.  I left Bailey  phone number for any questions/concerns as well as my number [phone] for any questions.

## 2023-05-08 ENCOUNTER — TELEPHONE (OUTPATIENT)
Dept: NON INVASIVE DIAGNOSTICS | Age: 83
End: 2023-05-08

## 2023-05-08 NOTE — TELEPHONE ENCOUNTER
Patient called in stating he woke up around [id number] this morning with pain in his upper left chest \"near\" his pacemaker incision. I asked if he had any other symptoms. He said it felt like his heart rate was a little fast. I asked if he was having any shortness of breath or \"heart pain\". He denied this. I tried talking him through sending a remote pacemaker transmission. He is legally blind and cannot see the arrow on the home monitor indicating when to  the hand held portion of the monitor. I asked if he has anyone else living with him. He does not. He states he is not longer having pain. He has an appointment on Friday, 4/12/23.      Edward Jiménez RN, BSN  [facility]

## 2023-05-19 ENCOUNTER — NURSE ONLY (OUTPATIENT)
Dept: NON INVASIVE DIAGNOSTICS | Age: 83
End: 2023-05-19

## 2023-05-19 DIAGNOSIS — Z95.0 PACEMAKER: Primary | ICD-10-CM

## 2023-05-24 DIAGNOSIS — I65.23 BILATERAL CAROTID ARTERY STENOSIS: Primary | ICD-10-CM

## 2023-06-29 ENCOUNTER — TELEPHONE (OUTPATIENT)
Dept: NON INVASIVE DIAGNOSTICS | Age: 83
End: 2023-06-29

## 2023-06-30 ENCOUNTER — TELEPHONE (OUTPATIENT)
Dept: NON INVASIVE DIAGNOSTICS | Age: 83
End: 2023-06-30

## 2023-07-30 PROCEDURE — 93296 REM INTERROG EVL PM/IDS: CPT | Performed by: INTERNAL MEDICINE

## 2023-07-30 PROCEDURE — 93294 REM INTERROG EVL PM/LDLS PM: CPT | Performed by: INTERNAL MEDICINE

## 2023-08-01 ENCOUNTER — HOSPITAL ENCOUNTER (OUTPATIENT)
Dept: CARDIOLOGY | Age: 83
Discharge: HOME | End: 2023-08-01
Attending: SURGERY
Payer: OTHER GOVERNMENT

## 2023-08-01 ENCOUNTER — OFFICE VISIT (OUTPATIENT)
Dept: VASCULAR SURGERY | Age: 83
End: 2023-08-01
Payer: OTHER GOVERNMENT

## 2023-08-01 VITALS — HEIGHT: 70 IN | WEIGHT: 205 LBS | BODY MASS INDEX: 29.35 KG/M2

## 2023-08-01 DIAGNOSIS — I65.23 BILATERAL CAROTID ARTERY STENOSIS: ICD-10-CM

## 2023-08-01 DIAGNOSIS — I65.23 BILATERAL CAROTID ARTERY STENOSIS: Primary | ICD-10-CM

## 2023-08-01 PROCEDURE — 99213 OFFICE O/P EST LOW 20 MIN: CPT | Performed by: SURGERY

## 2023-08-01 PROCEDURE — 1123F ACP DISCUSS/DSCN MKR DOCD: CPT | Performed by: SURGERY

## 2023-08-01 PROCEDURE — 93880 EXTRACRANIAL BILAT STUDY: CPT | Performed by: SURGERY

## 2023-08-01 PROCEDURE — 93880 EXTRACRANIAL BILAT STUDY: CPT

## 2023-08-01 NOTE — PROGRESS NOTES
[facility] Heart & Vascular Lab - [facility]    This is a pre read worksheet - prior to official physician interpretation    Lorrin Dose  1940  Date of study: 8/1/23    Indication for study:  Carotid artery stenosis  Study : Bilateral Carotid Artery Duplex Examination    Duplex examination of the RIGHT carotid artery system identifies atherosclerotic plaque. The peak systolic velocity in internal carotid artery was 182 centimeters / second. The maximum end diastolic velocity was 38 centimeters / second. The ICA/CCA ratio is 1.1. The right vertebral artery has antegrade flow. Duplex examination of the LEFT carotid artery system identifies atherosclerotic plaque. The peak systolic velocity in internal carotid artery was 276 centimeters / second. The maximum end diastolic velocity was 54 centimeters / second. The ICA/CCA ratio is 2.8. The left vertebral artery has antegrade flow.     Bilateral hard, dense plaques  Slightly limited   RIGHT /33        LAST STUDY  5/6/2022 @ [facility]

## 2023-08-01 NOTE — PROGRESS NOTES
Vascular Surgery Outpatient Progress Note      Chief Complaint   Patient presents with    Circulatory Problem     Bilateral carotid artery stenosis       HISTORY OF PRESENT ILLNESS:                The patient is a 81 y.o. male who returns for follow-up evaluation of carotid artery stenosis. The patient had a pacemaker inserted recently and overall states he is feeling well. He denies any symptoms of stroke, mini stroke, or amaurosis fugax. Past Medical History:        Diagnosis Date    Asthma     Blind right eye     CAD (coronary artery disease)     1994 CABG x 4    CHF (congestive heart failure) ([facility])     Chronic respiratory failure ([address]) 09/22/2014    CKD (chronic kidney disease) stage 3, GFR 30-59 ml/min ([facility])     Diabetes mellitus ([address])     Diabetes mellitus type 2, controlled ([address]) 05/23/2013    HLD (hyperlipidemia)     Hypertension     TIA (transient ischemic attack)      Past Surgical History:        Procedure Laterality Date    CARDIAC SURGERY      CORONARY ARTERY BYPASS GRAFT  1994    x4    ECHO COMPL W DOP COLOR FLOW  05/23/2013         ECHO COMPL W DOP COLOR FLOW  05/23/2013         EYE SURGERY      HC INSERT PICC CATH, 5/> YRS  02/27/2021         LAPAROSCOPY N/A 02/26/2021    exploratory laparotomy lysis of adhesions performed by Saul Murray MD at [facility] [address]  04/28/2023    Medtronic Dual PPM  Dr. Carolin Nur     Current Medications:   Prior to Admission medications    Medication Sig Start Date End Date Taking?  Authorizing Provider   lisinopril (PRINIVIL;ZESTRIL) 30 MG tablet Take 1 tablet by mouth daily 5/2/23  Yes RONNI Phillips - CNP   metoprolol succinate (TOPROL XL) 25 MG extended release tablet Take 1 tablet by mouth daily 4/28/23  Yes Rishi Reyes MD   Insulin Glargine-yfgn 100 UNIT/ML SOPN INJECT 12 UNITS SUBCUTANEOUSLY TWICE A DAY FOR DIABETES 3/7/23  Yes Historical Provider, MD   NIFEdipine (ADALAT CC) 30 MG extended release tablet Take 3 tablets by

## 2023-08-04 ENCOUNTER — TELEPHONE (OUTPATIENT)
Dept: CARDIOLOGY CLINIC | Age: 83
End: 2023-08-04

## 2023-08-04 ENCOUNTER — TELEPHONE (OUTPATIENT)
Dept: NON INVASIVE DIAGNOSTICS | Age: 83
End: 2023-08-04

## 2023-08-04 NOTE — TELEPHONE ENCOUNTER
Patient called to ask if he should start carvedilol again since having issues. Transferred to Dr. Yasmin Ward coordinator.      Electronically signed by Flavio Tatum MA on 8/4/2023 at 3:33 PM

## 2023-08-04 NOTE — TELEPHONE ENCOUNTER
Patient states that his heart rate has been up around 95 only when he lays on his side, he is asking if he needs to resume carvedilol, please advise

## 2023-08-08 NOTE — TELEPHONE ENCOUNTER
No coreg. Increase metoprolol to 12.5 mg twice a day. Governor NAVA Caldwell.   Cardiologist  Cardiology, [facility]

## 2023-08-08 NOTE — TELEPHONE ENCOUNTER
Patient is taking metoprolol 12.5mg daily, patient states he had an episode last night of heart pounding, please advise

## 2023-08-25 ENCOUNTER — TELEPHONE (OUTPATIENT)
Dept: CARDIOLOGY CLINIC | Age: 83
End: 2023-08-25

## 2023-08-25 ENCOUNTER — NURSE ONLY (OUTPATIENT)
Dept: NON INVASIVE DIAGNOSTICS | Age: 83
End: 2023-08-25
Payer: OTHER GOVERNMENT

## 2023-08-25 DIAGNOSIS — I44.1 AV BLOCK, MOBITZ 1: ICD-10-CM

## 2023-08-25 DIAGNOSIS — Z95.0 PACEMAKER: Primary | ICD-10-CM

## 2023-08-25 DIAGNOSIS — R00.1 BRADYCARDIA: ICD-10-CM

## 2023-08-25 PROCEDURE — 93280 PM DEVICE PROGR EVAL DUAL: CPT | Performed by: INTERNAL MEDICINE

## 2023-08-25 NOTE — TELEPHONE ENCOUNTER
Lvm for Monika @ [facility] to verify if device check @ EP needs a separate referral or if we can use Cardio referral.      VM rec'd back from [facility] advising since device check is listed on referral can be done in office or remotely.

## 2023-08-25 NOTE — TELEPHONE ENCOUNTER
Call to rCistina @ [facility] requesting extension of ref/auth for Cardio Dr. Maggi Peres 10/9/23 ref expires on 10/1/23.

## 2023-10-27 ENCOUNTER — OFFICE VISIT (OUTPATIENT)
Dept: CARDIOLOGY CLINIC | Age: 83
End: 2023-10-27
Payer: OTHER GOVERNMENT

## 2023-10-27 VITALS — DIASTOLIC BLOOD PRESSURE: 70 MMHG | SYSTOLIC BLOOD PRESSURE: 120 MMHG | HEART RATE: 80 BPM | OXYGEN SATURATION: 95 %

## 2023-10-27 DIAGNOSIS — R00.2 PALPITATIONS: Primary | ICD-10-CM

## 2023-10-27 PROCEDURE — 3078F DIAST BP <80 MM HG: CPT | Performed by: INTERNAL MEDICINE

## 2023-10-27 PROCEDURE — 99214 OFFICE O/P EST MOD 30 MIN: CPT | Performed by: INTERNAL MEDICINE

## 2023-10-27 PROCEDURE — 3074F SYST BP LT 130 MM HG: CPT | Performed by: INTERNAL MEDICINE

## 2023-10-27 PROCEDURE — 93000 ELECTROCARDIOGRAM COMPLETE: CPT | Performed by: INTERNAL MEDICINE

## 2023-10-27 PROCEDURE — 1123F ACP DISCUSS/DSCN MKR DOCD: CPT | Performed by: INTERNAL MEDICINE

## 2023-10-27 NOTE — PROGRESS NOTES
CHIEF COMPLAINT: CAD-CABG/CHF/Carotid disease    HISTORY OF PRESENT ILLNESS: Patient is a 81 y.o. male seen at the request of No primary care provider on file. .    Presents in follow up. Recent monitor suggesting sick sinus. Baseline COSTA. No CP or angina. No syncope. Hx of CAD-CABG.      Past Medical History:   Diagnosis Date    Asthma     Blind right eye     CAD (coronary artery disease)     1994 CABG x 4    CHF (congestive heart failure) ([address])     Chronic respiratory failure ([address]) 09/22/2014    CKD (chronic kidney disease) stage 3, GFR 30-59 ml/min (HCC)     Diabetes mellitus ([address])     Diabetes mellitus type 2, controlled ([address]) 05/23/2013    HLD (hyperlipidemia)     Hypertension     TIA (transient ischemic attack)        Patient Active Problem List   Diagnosis    COPD exacerbation ([address])    Chest pain    Hypertension    Coronary artery disease    Diabetes mellitus type 2, controlled ([address])    Hyperlipidemia    BPH (benign prostatic hyperplasia)    Hyperkalemia    Blind right eye    CKD (chronic kidney disease) stage 3, GFR 30-59 ml/min (HCC)    CAD (coronary artery disease)    Acute decompensated heart failure (HCC)    Chronic respiratory failure (HCC)    EMELY (acute kidney injury) ([address])    SBO (small bowel obstruction) ([address])    Cerebrovascular accident (CVA) ([address])    Hypernatremia    Uncontrolled type 2 diabetes mellitus with hyperglycemia (HCC)    Thrombocytopenia (HCC)    Essential hypertension    Hypertensive urgency    AV block, Mobitz 1    Acute on chronic heart failure with preserved ejection fraction (HCC)    Complete AV block (HCC)    Bradycardia    Pacemaker    Weakness       Allergies   Allergen Reactions    Dorzolamide Swelling    Seasonal      Other reaction(s): SWELLING-LOWER EYE LIDS    Spironolactone Other (See Comments)     Hyperkalemia--4/16/15  hyperkalemia  Hyperkalemia--4/16/15       Current Outpatient Medications   Medication Sig Dispense Refill    lisinopril (PRINIVIL;ZESTRIL) 30

## 2024-01-28 PROCEDURE — 93294 REM INTERROG EVL PM/LDLS PM: CPT | Performed by: INTERNAL MEDICINE

## 2024-01-28 PROCEDURE — 93296 REM INTERROG EVL PM/IDS: CPT | Performed by: INTERNAL MEDICINE

## 2024-04-28 PROCEDURE — 93294 REM INTERROG EVL PM/LDLS PM: CPT | Performed by: INTERNAL MEDICINE

## 2024-04-28 PROCEDURE — 93296 REM INTERROG EVL PM/IDS: CPT | Performed by: INTERNAL MEDICINE

## 2024-05-03 RX ORDER — FUROSEMIDE 20 MG/1
20 TABLET ORAL 2 TIMES DAILY
Qty: 60 TABLET | Refills: 0 | Status: SHIPPED | OUTPATIENT
Start: 2024-05-03

## 2024-05-08 ENCOUNTER — TELEPHONE (OUTPATIENT)
Dept: NON INVASIVE DIAGNOSTICS | Age: 84
End: 2024-05-08

## 2024-05-08 NOTE — TELEPHONE ENCOUNTER
Patient called in and stated he feels like his heart has been racing on and off for the last 24 hrs or so.  Patient is going to send a remote.

## 2024-05-08 NOTE — TELEPHONE ENCOUNTER
Reviewed carelink from 5.8.2024  Real time AS/ @ 81  No events.   Rate Histograms:     I called and spoke with patient regarding his most recent carelink transmission. Patient voiced understanding.     Jolynn ESPAÑAN,RN   [facility] Heart and Vascular [place]   Device Clinic

## 2024-07-17 ENCOUNTER — TELEPHONE (OUTPATIENT)
Dept: VASCULAR SURGERY | Age: 84
End: 2024-07-17

## 2024-07-19 DIAGNOSIS — I65.23 BILATERAL CAROTID ARTERY STENOSIS: Primary | ICD-10-CM

## 2024-08-05 PROCEDURE — 93294 REM INTERROG EVL PM/LDLS PM: CPT | Performed by: INTERNAL MEDICINE

## 2024-08-05 PROCEDURE — 93296 REM INTERROG EVL PM/IDS: CPT | Performed by: INTERNAL MEDICINE

## 2024-08-06 ENCOUNTER — OFFICE VISIT (OUTPATIENT)
Dept: VASCULAR SURGERY | Age: 84
End: 2024-08-06
Payer: OTHER GOVERNMENT

## 2024-08-06 ENCOUNTER — HOSPITAL ENCOUNTER (OUTPATIENT)
Dept: CARDIOLOGY | Age: 84
Discharge: HOME | End: 2024-08-08
Attending: SURGERY
Payer: OTHER GOVERNMENT

## 2024-08-06 VITALS — BODY MASS INDEX: 30.13 KG/M2 | WEIGHT: 210 LBS

## 2024-08-06 DIAGNOSIS — I65.23 BILATERAL CAROTID ARTERY STENOSIS: Primary | ICD-10-CM

## 2024-08-06 DIAGNOSIS — I65.23 BILATERAL CAROTID ARTERY STENOSIS: ICD-10-CM

## 2024-08-06 LAB
VAS LEFT CCA DIST EDV: 12.5 CM/S
VAS LEFT CCA DIST PSV: 103.2 CM/S
VAS LEFT CCA PROX EDV: 12.5 CM/S
VAS LEFT CCA PROX PSV: 88.7 CM/S
VAS LEFT ECA EDV: 0 CM/S
VAS LEFT ECA PSV: 216.8 CM/S
VAS LEFT ICA DIST EDV: 38.8 CM/S
VAS LEFT ICA DIST PSV: 138.4 CM/S
VAS LEFT ICA MID EDV: 32.3 CM/S
VAS LEFT ICA MID PSV: 105.9 CM/S
VAS LEFT ICA PROX EDV: 49.7 CM/S
VAS LEFT ICA PROX PSV: 280.9 CM/S
VAS LEFT ICA/CCA PSV: 2.7 NO UNITS
VAS LEFT VERTEBRAL EDV: 13.1 CM/S
VAS LEFT VERTEBRAL PSV: 58.4 CM/S
VAS RIGHT CCA DIST EDV: 12.9 CM/S
VAS RIGHT CCA DIST PSV: 73.9 CM/S
VAS RIGHT CCA PROX EDV: 10.3 CM/S
VAS RIGHT CCA PROX PSV: 72.6 CM/S
VAS RIGHT ECA EDV: 0 CM/S
VAS RIGHT ECA PSV: 101.4 CM/S
VAS RIGHT ICA DIST EDV: 23.4 CM/S
VAS RIGHT ICA DIST PSV: 86.9 CM/S
VAS RIGHT ICA MID EDV: 18.1 CM/S
VAS RIGHT ICA MID PSV: 127.6 CM/S
VAS RIGHT ICA PROX EDV: 25.8 CM/S
VAS RIGHT ICA PROX PSV: 157.9 CM/S
VAS RIGHT ICA/CCA PSV: 2.1 NO UNITS
VAS RIGHT VERTEBRAL EDV: 11.2 CM/S
VAS RIGHT VERTEBRAL PSV: 59.3 CM/S

## 2024-08-06 PROCEDURE — 1123F ACP DISCUSS/DSCN MKR DOCD: CPT | Performed by: NURSE PRACTITIONER

## 2024-08-06 PROCEDURE — 99212 OFFICE O/P EST SF 10 MIN: CPT | Performed by: NURSE PRACTITIONER

## 2024-08-06 PROCEDURE — 93880 EXTRACRANIAL BILAT STUDY: CPT

## 2024-08-06 NOTE — PROGRESS NOTES
Vascular Surgery Outpatient Progress Note      Chief Complaint   Patient presents with    Circulatory Problem     Bilateral carotid artery stenosis       HISTORY OF PRESENT ILLNESS:                The patient is a 84 y.o. male who returns for follow-up evaluation of carotid artery stenosis. The patient denies any symptoms of stroke, mini stroke, or amaurosis fugax.     Past Medical History:        Diagnosis Date    Asthma     Blind right eye     CAD (coronary artery disease)     1994 CABG x 4    CHF (congestive heart failure) ([facility])     Chronic respiratory failure ([facility]) 09/22/2014    CKD (chronic kidney disease) stage 3, GFR 30-59 ml/min ([facility])     Diabetes mellitus ([facility])     Diabetes mellitus type 2, controlled ([facility]) 05/23/2013    HLD (hyperlipidemia)     Hypertension     TIA (transient ischemic attack)      Past Surgical History:        Procedure Laterality Date    CARDIAC SURGERY      CORONARY ARTERY BYPASS GRAFT  1994    x4    ECHO COMPL W DOP COLOR FLOW  05/23/2013         ECHO COMPL W DOP COLOR FLOW  05/23/2013         EYE SURGERY      HC INSERT PICC CATH, 5/> YRS  02/27/2021         LAPAROSCOPY N/A 02/26/2021    exploratory laparotomy lysis of adhesions performed by Gianni Garza MD at [facility]    PACEMAKER INSERTION  04/28/2023    Medtronic Dual PPM  Dr. Blanca     Current Medications:   Prior to Admission medications    Medication Sig Start Date End Date Taking? Authorizing Provider   furosemide (LASIX) 20 MG tablet Take 1 tablet by mouth 2 times daily 5/3/24  Yes Gaby Yost T, DO   lisinopril (PRINIVIL;ZESTRIL) 30 MG tablet Take 1 tablet by mouth daily 5/2/23  Yes Luis Guevara, APRN - CNP   metoprolol succinate (TOPROL XL) 25 MG extended release tablet Take 1 tablet by mouth daily 4/28/23  Yes Annette Blanca MD   Insulin Glargine-yfgn 100 UNIT/ML SOPN INJECT 12 UNITS SUBCUTANEOUSLY TWICE A DAY FOR DIABETES 3/7/23  Yes Provider, MD Vi   NIFEdipine (ADALAT CC) 30 MG extended

## 2025-02-12 PROCEDURE — 93294 REM INTERROG EVL PM/LDLS PM: CPT | Performed by: INTERNAL MEDICINE

## 2025-02-12 PROCEDURE — 93296 REM INTERROG EVL PM/IDS: CPT | Performed by: INTERNAL MEDICINE

## 2025-02-13 ENCOUNTER — TELEPHONE (OUTPATIENT)
Dept: CARDIOLOGY | Age: 85
End: 2025-02-13

## 2025-02-13 ENCOUNTER — TELEPHONE (OUTPATIENT)
Dept: NON INVASIVE DIAGNOSTICS | Age: 85
End: 2025-02-13

## 2025-02-13 DIAGNOSIS — R94.31 ABNORMAL EKG: Primary | ICD-10-CM

## 2025-02-13 NOTE — TELEPHONE ENCOUNTER
Pt notified of results and verbalized understanding.  ECHO order placed and the patient was transferred to the scheduling dept.     Electronically signed by Sariah Torre MA on 2/13/2025 at 9:58 AM

## 2025-02-13 NOTE — TELEPHONE ENCOUNTER
Spoke to patient about getting [facility] on 02/24/25 @ 2:30. Patient verbalized understanding that he has to call and get authorization for this test.     Electronically signed by Shayna Winters on 2/13/2025 at 11:22 AM

## 2025-02-13 NOTE — TELEPHONE ENCOUNTER
----- Message from Dr. Annette Blanca MD sent at 2/12/2025  6:02 PM EST -----  Non-sustained Ventricular Tachycardia  1  · Stored EGMs are consistent with or suggestive of Non-sustained VT  · Total episodes: 2/2 EGM, longest 17 beats. fastest 171 bpm. most recent on 1/21/25  Created By: Belen Zayas 02/12/2025 10:45 AM    Please get echo. He also called this evening complaining of elevated heart rate. Please check on him. Thanks.

## 2025-02-18 ENCOUNTER — TELEPHONE (OUTPATIENT)
Dept: NON INVASIVE DIAGNOSTICS | Age: 85
End: 2025-02-18

## 2025-02-18 NOTE — TELEPHONE ENCOUNTER
Belen Zayas, APRN - CNP  Jolynn Mason, RN  He has an echo on 2/24/25, can you have him come up before the echo and do device check to do device check and see if he is having more NSVT with sx since he will be here anyway?      2.18.2025: left message for patient to call the office.     Plan:   Schedule device clinic appointment at 2:00 on 2/24/2025 prior to his echo appointment at 2:30.     Jolynn COHEN,RN   [facility] [place]   Device Clinic

## 2025-02-18 NOTE — TELEPHONE ENCOUNTER
Scheduled device clinic appointment for 2/24/2025 @ 2:00    Jolynn COHEN,RN   [facility] [place]   Device Clinic

## 2025-02-20 ENCOUNTER — TELEPHONE (OUTPATIENT)
Dept: CARDIOLOGY | Age: 85
End: 2025-02-20

## 2025-02-20 NOTE — TELEPHONE ENCOUNTER
Called patient and l/m to check on status of [facility] auth for echo on 02/24. Patient was made aware when scheduling he needs to get his auth from the [facility], I l/m with patient to call us back and let us know the status of this, I did mention that ne heeds this auth in order to get echo.     Electronically signed by Shayna Winters on 2/20/2025 at 9:56 AM

## 2025-02-21 ENCOUNTER — TELEPHONE (OUTPATIENT)
Dept: CARDIOLOGY | Age: 85
End: 2025-02-21

## 2025-02-21 NOTE — TELEPHONE ENCOUNTER
Called patient again to confirm [facility] auth status, he said he called and they are working on it. I told him to please call today (02/21) to get an update and see if they can fax it by this afternoon. Patient is aware if we do not have it by Monday morning (02/24) we will have to reschedule. Patient verbalized understanding.    Electronically signed by Shayna Winters on 2/21/2025 at 8:02 AM    
yes

## 2025-03-24 ENCOUNTER — HOSPITAL ENCOUNTER (OUTPATIENT)
Dept: CARDIOLOGY | Age: 85
Discharge: HOME | End: 2025-03-26
Attending: INTERNAL MEDICINE
Payer: OTHER GOVERNMENT

## 2025-03-24 VITALS
DIASTOLIC BLOOD PRESSURE: 70 MMHG | BODY MASS INDEX: 30.06 KG/M2 | WEIGHT: 210 LBS | SYSTOLIC BLOOD PRESSURE: 120 MMHG | HEIGHT: 70 IN

## 2025-03-24 DIAGNOSIS — R94.31 ABNORMAL EKG: ICD-10-CM

## 2025-03-24 LAB
ECHO AO ASC DIAM: 2.9 CM
ECHO AO ASCENDING AORTA INDEX: 1.36 CM/M2
ECHO AV AREA PEAK VELOCITY: 2.1 CM2
ECHO AV AREA PLAN/BSA: 1.13 CM2/M2
ECHO AV AREA PLAN: 2.4 CM2
ECHO AV AREA VTI: 1.9 CM2
ECHO AV AREA/BSA PEAK VELOCITY: 1 CM2/M2
ECHO AV AREA/BSA VTI: 0.9 CM2/M2
ECHO AV CUSP MM: 1.7 CM
ECHO AV MEAN GRADIENT: 7 MMHG
ECHO AV MEAN VELOCITY: 1.2 M/S
ECHO AV PEAK GRADIENT: 15 MMHG
ECHO AV PEAK VELOCITY: 1.9 M/S
ECHO AV VELOCITY RATIO: 0.63
ECHO AV VTI: 40.5 CM
ECHO BSA: 2.17 M2
ECHO EST RA PRESSURE: 8 MMHG
ECHO IVC PROX: 2.3 CM
ECHO LA DIAMETER INDEX: 2.54 CM/M2
ECHO LA DIAMETER: 5.4 CM
ECHO LA VOL A-L A2C: 140 ML (ref 18–58)
ECHO LA VOL A-L A4C: 95 ML (ref 18–58)
ECHO LA VOL MOD A2C: 134 ML (ref 18–58)
ECHO LA VOL MOD A4C: 89 ML (ref 18–58)
ECHO LA VOLUME AREA LENGTH: 116 ML
ECHO LA VOLUME INDEX A-L A2C: 66 ML/M2 (ref 16–34)
ECHO LA VOLUME INDEX A-L A4C: 45 ML/M2 (ref 16–34)
ECHO LA VOLUME INDEX AREA LENGTH: 54 ML/M2 (ref 16–34)
ECHO LA VOLUME INDEX MOD A2C: 63 ML/M2 (ref 16–34)
ECHO LA VOLUME INDEX MOD A4C: 42 ML/M2 (ref 16–34)
ECHO LV E' LATERAL VELOCITY: 0.04 CM/S
ECHO LV E' SEPTAL VELOCITY: 0.03 CM/S
ECHO LV EDV A2C: 63 ML
ECHO LV EDV A4C: 92 ML
ECHO LV EDV BP: 77 ML (ref 67–155)
ECHO LV EDV INDEX A4C: 43 ML/M2
ECHO LV EDV INDEX BP: 36 ML/M2
ECHO LV EDV NDEX A2C: 30 ML/M2
ECHO LV EF PHYSICIAN: 50 %
ECHO LV EJECTION FRACTION A2C: 53 %
ECHO LV EJECTION FRACTION A4C: 43 %
ECHO LV ESV A2C: 30 ML
ECHO LV ESV A4C: 52 ML
ECHO LV ESV BP: 40 ML (ref 22–58)
ECHO LV ESV INDEX A2C: 14 ML/M2
ECHO LV ESV INDEX A4C: 24 ML/M2
ECHO LV ESV INDEX BP: 19 ML/M2
ECHO LV FRACTIONAL SHORTENING: 26 % (ref 28–44)
ECHO LV INTERNAL DIMENSION DIASTOLE INDEX: 2.54 CM/M2
ECHO LV INTERNAL DIMENSION DIASTOLIC: 5.4 CM (ref 4.2–5.9)
ECHO LV INTERNAL DIMENSION SYSTOLIC INDEX: 1.88 CM/M2
ECHO LV INTERNAL DIMENSION SYSTOLIC: 4 CM
ECHO LV ISOVOLUMETRIC RELAXATION TIME (IVRT): 143 MS
ECHO LV IVSD: 1.6 CM (ref 0.6–1)
ECHO LV IVSS: 1.9 CM
ECHO LV MASS 2D: 380.5 G (ref 88–224)
ECHO LV MASS INDEX 2D: 178.7 G/M2 (ref 49–115)
ECHO LV POSTERIOR WALL DIASTOLIC: 1.5 CM (ref 0.6–1)
ECHO LV POSTERIOR WALL SYSTOLIC: 1.9 CM
ECHO LV RELATIVE WALL THICKNESS RATIO: 0.56
ECHO LVOT AREA: 3.5 CM2
ECHO LVOT AV VTI INDEX: 0.53
ECHO LVOT DIAM: 2.1 CM
ECHO LVOT MEAN GRADIENT: 2 MMHG
ECHO LVOT PEAK GRADIENT: 5 MMHG
ECHO LVOT PEAK VELOCITY: 1.2 M/S
ECHO LVOT STROKE VOLUME INDEX: 34.6 ML/M2
ECHO LVOT SV: 73.7 ML
ECHO LVOT VTI: 21.3 CM
ECHO MV "A" WAVE DURATION: 175.3 MSEC
ECHO MV A VELOCITY: 1.64 M/S
ECHO MV AREA PHT: 1.3 CM2
ECHO MV AREA VTI: 2.4 CM2
ECHO MV E DECELERATION TIME (DT): 286.7 MS
ECHO MV E VELOCITY: 0.75 M/S
ECHO MV E/A RATIO: 0.46
ECHO MV E/E' LATERAL: 1875
ECHO MV E/E' RATIO (AVERAGED): 2187.5
ECHO MV E/E' SEPTAL: 2500
ECHO MV LVOT VTI INDEX: 1.44
ECHO MV MAX VELOCITY: 1.5 M/S
ECHO MV MEAN GRADIENT: 3 MMHG
ECHO MV MEAN VELOCITY: 0.8 M/S
ECHO MV PEAK GRADIENT: 9 MMHG
ECHO MV PRESSURE HALF TIME (PHT): 175.4 MS
ECHO MV VTI: 30.6 CM
ECHO PV MAX VELOCITY: 0.8 M/S
ECHO PV MEAN GRADIENT: 1 MMHG
ECHO PV MEAN VELOCITY: 0.5 M/S
ECHO PV PEAK GRADIENT: 2 MMHG
ECHO PV VTI: 16.2 CM
ECHO PVEIN A DURATION: 124.6 MS
ECHO PVEIN A VELOCITY: 0.3 M/S
ECHO PVEIN PEAK D VELOCITY: 0.7 M/S
ECHO PVEIN PEAK S VELOCITY: 0.5 M/S
ECHO PVEIN S/D RATIO: 0.7
ECHO RIGHT VENTRICULAR SYSTOLIC PRESSURE (RVSP): 41 MMHG
ECHO RV BASAL DIMENSION: 5.2 CM
ECHO RV INTERNAL DIMENSION: 4 CM
ECHO RV LONGITUDINAL DIMENSION: 7.6 CM
ECHO RV MID DIMENSION: 3.5 CM
ECHO RV TAPSE: 1.6 CM (ref 1.7–?)
ECHO TV REGURGITANT MAX VELOCITY: 2.87 M/S
ECHO TV REGURGITANT PEAK GRADIENT: 33 MMHG

## 2025-03-24 PROCEDURE — 93306 TTE W/DOPPLER COMPLETE: CPT

## 2025-03-24 PROCEDURE — 93306 TTE W/DOPPLER COMPLETE: CPT | Performed by: INTERNAL MEDICINE

## 2025-03-25 ENCOUNTER — RESULTS FOLLOW-UP (OUTPATIENT)
Dept: NON INVASIVE DIAGNOSTICS | Age: 85
End: 2025-03-25

## 2025-03-25 NOTE — TELEPHONE ENCOUNTER
Attempted to contact the patient but he did not answer and VM is full.     Electronically signed by Sariah Torre MA on 3/25/2025 at 9:32 AM

## 2025-03-25 NOTE — TELEPHONE ENCOUNTER
----- Message from Dr. Annette Blanca MD sent at 3/24/2025  5:21 PM EDT -----  Echo showed low normal LV function. Continue current treatment. Thanks.  ----- Message -----  From: Gaby Yost DO  Sent: 3/24/2025   4:34 PM EDT  To: Annette Blanca MD

## 2025-03-26 NOTE — TELEPHONE ENCOUNTER
The patient's phone goes straight to , I left a message on the secondary line to have the patient call our office.     Electronically signed by Sariah Torre MA on 3/26/2025 at 1:36 PM

## 2025-03-28 NOTE — TELEPHONE ENCOUNTER
Pt notified of results and verbalized understanding.    Electronically signed by Sariah Torre MA on 3/28/2025 at 11:35 AM

## 2025-03-31 NOTE — PROGRESS NOTES
[facility]  Outpatient Progress Note   PATIENT: Cong Tavera  MEDICAL RECORD NUMBER: [id number]  DATE OF SERVICE:  4/1/2025  ATTENDING ELECTROPHYSIOLOGIST: Annette Blanca MD  REFERRING PHYSICIAN: No ref. provider found and Luis Rooney PA-C  CHIEF COMPLAINT: Pacemaker in situ    HPI: This is a 85 y.o. male with a history of   Patient Active Problem List   Diagnosis    COPD exacerbation ([facility])    Chest pain    Hypertension    Coronary artery disease    Diabetes mellitus type 2, controlled (HCC)    Hyperlipidemia    BPH (benign prostatic hyperplasia)    Hyperkalemia    Blind right eye    CKD (chronic kidney disease) stage 3, GFR 30-59 ml/min (HCC)    CAD (coronary artery disease)    Acute decompensated heart failure (HCC)    Chronic respiratory failure    EMELY (acute kidney injury)    SBO (small bowel obstruction) ([facility])    Cerebrovascular accident (CVA) ([facility])    Hypernatremia    Uncontrolled type 2 diabetes mellitus with hyperglycemia ([facility])    Thrombocytopenia    Essential hypertension    Hypertensive urgency    AV block, Mobitz 1    Acute on chronic heart failure with preserved ejection fraction ([facility])    Complete AV block ([facility])    Bradycardia    Pacemaker    Weakness   who presents to cardiac electrophysiology clinic for follow up and management of pacemaker in situ. He was admitted to the hospital for uncontrolled hypertension in January 2023 and exertional dyspnea at that time his telemetry showed a Mobitz AV block type I and his beta-blockers were stopped due to this.  At discharge he wore a 14-day monitor that showed 64 episodes of complete AV block lasting a total of 17 minutes and 5 seconds second-degree Mobitz 1 AV block.  Additional history includes coronary artery disease with prior CABG times 4/19/99, chronic HFpEF managed by Dr. Salas, peripheral artery disease, hypertension, hyperlipidemia, COPD, TIA, diabetes mellitus, BPH

## 2025-04-01 ENCOUNTER — OFFICE VISIT (OUTPATIENT)
Dept: NON INVASIVE DIAGNOSTICS | Age: 85
End: 2025-04-01

## 2025-04-01 VITALS
TEMPERATURE: 97.7 F | SYSTOLIC BLOOD PRESSURE: 134 MMHG | HEART RATE: 89 BPM | OXYGEN SATURATION: 93 % | DIASTOLIC BLOOD PRESSURE: 62 MMHG | HEIGHT: 68 IN | RESPIRATION RATE: 16 BRPM | WEIGHT: 199 LBS | BODY MASS INDEX: 30.16 KG/M2

## 2025-04-01 DIAGNOSIS — Z95.0 CARDIAC PACEMAKER IN SITU: ICD-10-CM

## 2025-04-01 DIAGNOSIS — I49.9 IRREGULAR HEART BEAT: Primary | ICD-10-CM

## 2025-04-09 ENCOUNTER — TELEPHONE (OUTPATIENT)
Dept: CARDIOLOGY CLINIC | Age: 85
End: 2025-04-09

## 2025-04-10 ENCOUNTER — TELEPHONE (OUTPATIENT)
Dept: CARDIOLOGY CLINIC | Age: 85
End: 2025-04-10

## 2025-04-15 ENCOUNTER — TELEPHONE (OUTPATIENT)
Dept: CARDIOLOGY CLINIC | Age: 85
End: 2025-04-15

## 2025-04-15 NOTE — TELEPHONE ENCOUNTER
[facility], [facility]  [address] [place]  [id number]  4/15/2025    Cong Tavera (1940)   [address], [place] [address]    Telephone Encounter  Schedule [facility] Transportation Van to a [facility] location.      Appointment scheduled for May 22, 2025 @ 13:30 PM.      Van Safety rules discussed, specifically:  wheelchair must be secured for transport if utilized.  patient safety belt (seat belt) must be worn at all times inside van.    Patient verbalizes understanding and indicates that they have accepted to follow the van safety rules.  (If patient declines patient safety rules, appointment for transport will not be scheduled)        Telephone Encounter  Schedule [facility] Transportation Van to a [facility] location.      Appointment scheduled for     Van Safety rules discussed, specifically:  wheelchair must be secured for transport if utilized.  patient safety belt (seat belt) must be worn at all times inside van.    Patient verbalizes understanding and indicates that they have accepted to follow the van safety rules.   (If patient declines patient safety rules, appointment for transport will not be scheduled)

## 2025-05-22 ENCOUNTER — OFFICE VISIT (OUTPATIENT)
Dept: CARDIOLOGY CLINIC | Age: 85
End: 2025-05-22
Payer: OTHER GOVERNMENT

## 2025-05-22 VITALS
WEIGHT: 202.6 LBS | BODY MASS INDEX: 30.01 KG/M2 | TEMPERATURE: 98 F | RESPIRATION RATE: 18 BRPM | SYSTOLIC BLOOD PRESSURE: 138 MMHG | OXYGEN SATURATION: 91 % | HEART RATE: 80 BPM | HEIGHT: 69 IN | DIASTOLIC BLOOD PRESSURE: 70 MMHG

## 2025-05-22 DIAGNOSIS — I10 HYPERTENSION, UNSPECIFIED TYPE: Primary | Chronic | ICD-10-CM

## 2025-05-22 DIAGNOSIS — I25.10 CORONARY ARTERY DISEASE, UNSPECIFIED VESSEL OR LESION TYPE, UNSPECIFIED WHETHER ANGINA PRESENT, UNSPECIFIED WHETHER NATIVE OR TRANSPLANTED HEART: Chronic | ICD-10-CM

## 2025-05-22 DIAGNOSIS — I50.32 CHRONIC DIASTOLIC CONGESTIVE HEART FAILURE (HCC): ICD-10-CM

## 2025-05-22 PROCEDURE — 93000 ELECTROCARDIOGRAM COMPLETE: CPT | Performed by: INTERNAL MEDICINE

## 2025-05-22 PROCEDURE — 1123F ACP DISCUSS/DSCN MKR DOCD: CPT | Performed by: INTERNAL MEDICINE

## 2025-05-22 PROCEDURE — G2211 COMPLEX E/M VISIT ADD ON: HCPCS | Performed by: INTERNAL MEDICINE

## 2025-05-22 PROCEDURE — 99214 OFFICE O/P EST MOD 30 MIN: CPT | Performed by: INTERNAL MEDICINE

## 2025-05-22 PROCEDURE — 3075F SYST BP GE 130 - 139MM HG: CPT | Performed by: INTERNAL MEDICINE

## 2025-05-22 PROCEDURE — 3078F DIAST BP <80 MM HG: CPT | Performed by: INTERNAL MEDICINE

## 2025-05-22 NOTE — PROGRESS NOTES
Diagnosis    COPD exacerbation (HCC)    Chest pain    Hypertension    Coronary artery disease    Diabetes mellitus type 2, controlled (HCC)    Hyperlipidemia    BPH (benign prostatic hyperplasia)    Hyperkalemia    Blind right eye    CKD (chronic kidney disease) stage 3, GFR 30-59 ml/min (HCC)    CAD (coronary artery disease)    Acute decompensated heart failure (HCC)    Chronic respiratory failure (HCC)    EMELY (acute kidney injury)    SBO (small bowel obstruction) (HCC)    Cerebrovascular accident (CVA) ([facility])    Hypernatremia    Uncontrolled type 2 diabetes mellitus with hyperglycemia (HCC)    Thrombocytopenia    Essential hypertension    Hypertensive urgency    AV block, Mobitz 1    Acute on chronic heart failure with preserved ejection fraction (HCC)    Complete AV block (HCC)    Bradycardia    Pacemaker    Weakness     1. CAD-CABG:    Stable stress 2/2021.    Stable symptoms.     ASA/plavix/statin/BB.    2. Chronic diastolic CHF: Monitor volume.     BB/ACEI/hydralazine/lasix.    3. SSS/Pacer: Post Medtronic DDD pacer 4/28/2023.    Per EP.     4. HTN: Observe.     5. Lipids: Statin.     6. DM: Per PCP.    7. CKD: Follow labs.    8. CVA: ASA/plavix/statin.    9. Carotid disease: Follows with vascular    10. COPD    11. Legally blind    Available external charts reviewed.   Available imaging and evaluations independently reviewed.   Interviewed and discussed patient with available family.    Gaby Yost D.O.  Cardiologist  Cardiology, [facility]

## 2025-06-17 NOTE — TELEPHONE ENCOUNTER
Called patient to bring in to the office with CK on 03/23/2023 @ 11:00 AM but there was no answer and voicemail box was full. Called alternate contact and same issue.        Electronically signed by Sherryle Qualia, MA on 3/20/2023 at 2:55 PM
(E4) spontaneous
Deep Sutures: 4-0 Monocryl

## 2025-08-05 ENCOUNTER — TELEPHONE (OUTPATIENT)
Dept: VASCULAR SURGERY | Age: 85
End: 2025-08-05

## 2025-08-06 DIAGNOSIS — I65.23 BILATERAL CAROTID ARTERY STENOSIS: Primary | ICD-10-CM

## 2025-08-19 ENCOUNTER — HOSPITAL ENCOUNTER (OUTPATIENT)
Dept: CARDIOLOGY | Age: 85
Discharge: HOME | End: 2025-08-21
Payer: OTHER GOVERNMENT

## 2025-08-19 ENCOUNTER — OFFICE VISIT (OUTPATIENT)
Dept: VASCULAR SURGERY | Age: 85
End: 2025-08-19
Payer: OTHER GOVERNMENT

## 2025-08-19 VITALS — WEIGHT: 205 LBS | BODY MASS INDEX: 30.72 KG/M2

## 2025-08-19 DIAGNOSIS — I65.23 BILATERAL CAROTID ARTERY STENOSIS: ICD-10-CM

## 2025-08-19 DIAGNOSIS — I65.23 BILATERAL CAROTID ARTERY STENOSIS: Primary | ICD-10-CM

## 2025-08-19 LAB
VAS LEFT CCA DIST EDV: 12.5 CM/S
VAS LEFT CCA DIST PSV: 96 CM/S
VAS LEFT CCA PROX EDV: 12.6 CM/S
VAS LEFT CCA PROX PSV: 114.8 CM/S
VAS LEFT ECA EDV: 8.7 CM/S
VAS LEFT ECA PSV: 284.3 CM/S
VAS LEFT ICA DIST EDV: 21.4 CM/S
VAS LEFT ICA DIST PSV: 123.2 CM/S
VAS LEFT ICA MID EDV: 21.6 CM/S
VAS LEFT ICA MID PSV: 117.4 CM/S
VAS LEFT ICA PROX EDV: 62.7 CM/S
VAS LEFT ICA PROX PSV: 351.2 CM/S
VAS LEFT ICA/CCA PSV: 3.1 NO UNITS
VAS LEFT VERTEBRAL EDV: 12 CM/S
VAS LEFT VERTEBRAL PSV: 59.2 CM/S
VAS RIGHT CCA DIST EDV: 12.1 CM/S
VAS RIGHT CCA DIST PSV: 75.8 CM/S
VAS RIGHT CCA PROX EDV: 0 CM/S
VAS RIGHT CCA PROX PSV: 119.6 CM/S
VAS RIGHT ECA EDV: 0 CM/S
VAS RIGHT ECA PSV: 123.9 CM/S
VAS RIGHT ICA DIST EDV: 18 CM/S
VAS RIGHT ICA DIST PSV: 90.5 CM/S
VAS RIGHT ICA MID EDV: 19.9 CM/S
VAS RIGHT ICA MID PSV: 140.4 CM/S
VAS RIGHT ICA PROX EDV: 38.3 CM/S
VAS RIGHT ICA PROX PSV: 212.2 CM/S
VAS RIGHT ICA/CCA PSV: 1.8 NO UNITS
VAS RIGHT VERTEBRAL EDV: 8.6 CM/S
VAS RIGHT VERTEBRAL PSV: 30.2 CM/S

## 2025-08-19 PROCEDURE — 1123F ACP DISCUSS/DSCN MKR DOCD: CPT | Performed by: PHYSICIAN ASSISTANT

## 2025-08-19 PROCEDURE — 93880 EXTRACRANIAL BILAT STUDY: CPT

## 2025-08-19 PROCEDURE — 99213 OFFICE O/P EST LOW 20 MIN: CPT | Performed by: PHYSICIAN ASSISTANT

## 2025-08-25 LAB
VAS LEFT CCA DIST EDV: 12.5 CM/S
VAS LEFT CCA DIST PSV: 96 CM/S
VAS LEFT CCA PROX EDV: 12.6 CM/S
VAS LEFT CCA PROX PSV: 114.8 CM/S
VAS LEFT ECA EDV: 8.7 CM/S
VAS LEFT ECA PSV: 284.3 CM/S
VAS LEFT ICA DIST EDV: 21.4 CM/S
VAS LEFT ICA DIST PSV: 123.2 CM/S
VAS LEFT ICA MID EDV: 21.6 CM/S
VAS LEFT ICA MID PSV: 117.4 CM/S
VAS LEFT ICA PROX EDV: 62.7 CM/S
VAS LEFT ICA PROX PSV: 351.2 CM/S
VAS LEFT ICA/CCA PSV: 3.1 NO UNITS
VAS LEFT VERTEBRAL EDV: 12 CM/S
VAS LEFT VERTEBRAL PSV: 59.2 CM/S
VAS RIGHT CCA DIST EDV: 12.1 CM/S
VAS RIGHT CCA DIST PSV: 75.8 CM/S
VAS RIGHT CCA PROX EDV: 0 CM/S
VAS RIGHT CCA PROX PSV: 119.6 CM/S
VAS RIGHT ECA EDV: 0 CM/S
VAS RIGHT ECA PSV: 123.9 CM/S
VAS RIGHT ICA DIST EDV: 18 CM/S
VAS RIGHT ICA DIST PSV: 90.5 CM/S
VAS RIGHT ICA MID EDV: 19.9 CM/S
VAS RIGHT ICA MID PSV: 140.4 CM/S
VAS RIGHT ICA PROX EDV: 38.3 CM/S
VAS RIGHT ICA PROX PSV: 212.2 CM/S
VAS RIGHT ICA/CCA PSV: 1.8 NO UNITS
VAS RIGHT VERTEBRAL EDV: 8.6 CM/S
VAS RIGHT VERTEBRAL PSV: 30.2 CM/S

## (undated) DEVICE — ADHESIVE SKIN CLSR 0.7ML TOP DERMBND ADV

## (undated) DEVICE — FORCEPS MARYLAND DISSECTOR

## (undated) DEVICE — SET ENDO INSTR LAPAROSCOPIC STGENLAP

## (undated) DEVICE — ACCESS PLATFORM FOR MINIMALLY INVASIVE SURGERY.: Brand: GELPORT® LAPAROSCOPIC  SYSTEM

## (undated) DEVICE — APPLICATOR PREP 26ML 0.7% IOD POVACRYLEX 74% ISO ALC ST

## (undated) DEVICE — PLUMEPORT LAPAROSCOPIC SMOKE FILTRATION DEVICE: Brand: PLUMEPORT ACTIV

## (undated) DEVICE — SCISSORS ENDOSCP DIA5MM CRV MPLR CAUT W/ RATCH HNDL

## (undated) DEVICE — SPONGE,LAP,4"X18",XR,ST,5/PK,40PK/CS: Brand: MEDLINE INDUSTRIES, INC.

## (undated) DEVICE — TROCAR: Brand: KII® SLEEVE

## (undated) DEVICE — TROCAR: Brand: KII SHIELDED BLADED ACCESS SYSTEM

## (undated) DEVICE — SYRINGE 20ML LL S/C 50

## (undated) DEVICE — TROCAR: Brand: KII FIOS FIRST ENTRY

## (undated) DEVICE — SYRINGE IRRIG 60ML SFT PLIABLE BLB EZ TO GRP 1 HND USE W/

## (undated) DEVICE — GOWN,SIRUS,FABRNF,L,20/CS: Brand: MEDLINE

## (undated) DEVICE — Z INACTIVE USE 2660664 SOLUTION IRRIG 3000ML 0.9% SOD CHL USP UROMATIC PLAS CONT

## (undated) DEVICE — GLOVE ORANGE PI 8   MSG9080

## (undated) DEVICE — ELECTRODE PT RET AD L9FT HI MOIST COND ADH HYDRGEL CORDED

## (undated) DEVICE — BLADE CLIPPER GEN PURP NS

## (undated) DEVICE — NEEDLE CLOSURE OMNICLOSE

## (undated) DEVICE — COVER HNDL LT DISP

## (undated) DEVICE — COVER,TABLE,60X90,STERILE: Brand: MEDLINE

## (undated) DEVICE — INSUFFLATION TUBING SET WITH FILTER, FUNNEL CONNECTOR AND LUER LOCK: Brand: JOSNOE MEDICAL INC

## (undated) DEVICE — DOUBLE BASIN SET: Brand: MEDLINE INDUSTRIES, INC.

## (undated) DEVICE — ENDOPATH REPROC BABCOCK

## (undated) DEVICE — CAMERA STRYKER 1488

## (undated) DEVICE — PACK PROCEDURE SURG GEN CUST

## (undated) DEVICE — SET INSTRUMENT LAP II

## (undated) DEVICE — INTENDED FOR TISSUE SEPARATION, AND OTHER PROCEDURES THAT REQUIRE A SHARP SURGICAL BLADE TO PUNCTURE OR CUT.: Brand: BARD-PARKER ® STAINLESS STEEL BLADES

## (undated) DEVICE — DRAPE,REIN 53X77,STERILE: Brand: MEDLINE

## (undated) DEVICE — KIT SURG W7XL11IN 2 PKT UNTREATED NA

## (undated) DEVICE — INSTRUMENT CLAMP TOWEL LARGE REUSABLE

## (undated) DEVICE — INSUFFLATION NEEDLE TO ESTABLISH PNEUMOPERITONEUM.: Brand: INSUFFLATION NEEDLE

## (undated) DEVICE — GOWN,SIRUS,FABRNF,XL,20/CS: Brand: MEDLINE

## (undated) DEVICE — SOLUTION IV IRRIG POUR BRL 0.9% SODIUM CHL 2F7124

## (undated) DEVICE — YANKAUER,POOLE TIP,STERILE,50/CS: Brand: MEDLINE

## (undated) DEVICE — TOWEL,OR,DSP,ST,BLUE,STD,6/PK,12PK/CS: Brand: MEDLINE

## (undated) DEVICE — KIT,ANTI FOG,W/SPONGE & FLUID,SOFT PACK: Brand: MEDLINE

## (undated) DEVICE — SET INSTRUMENT LAP I

## (undated) DEVICE — LAPAROSCOPE 30DEG 5 AND 10MM

## (undated) DEVICE — SYRINGE MED 10ML POLYPR LUERSLIP TIP FLAT TOP W/O SFTY DISP